# Patient Record
Sex: MALE | Race: WHITE | Employment: OTHER | URBAN - METROPOLITAN AREA
[De-identification: names, ages, dates, MRNs, and addresses within clinical notes are randomized per-mention and may not be internally consistent; named-entity substitution may affect disease eponyms.]

---

## 2017-03-30 NOTE — TELEPHONE ENCOUNTER
Patient is calling to request a refill for medication listed below. Has a six day supply. Please advise. Levothyroxine Sodium 100 MCG Oral Tab Take 1 tablet (100 mcg total) by mouth once daily.  Disp: 90 tablet Rfl: 0

## 2017-04-01 RX ORDER — LEVOTHYROXINE SODIUM 0.1 MG/1
100 TABLET ORAL
Qty: 90 TABLET | Refills: 0 | Status: SHIPPED | OUTPATIENT
Start: 2017-04-01 | End: 2017-06-30

## 2017-04-01 NOTE — TELEPHONE ENCOUNTER
Hypothyroid Medications  Protocol Criteria:  Appointment scheduled in the past 12 months or the next 3 months  TSH resulted in the past 12 months that is normal  Recent Visits       Provider Department Primary Dx    3 months ago Cristo Victoria MD [place]

## 2017-06-30 NOTE — TELEPHONE ENCOUNTER
LEVOTHYROXINE failed protocol, no evidence of TSH results on file or in media  Hypothyroid Medications  Protocol Criteria:  Appointment scheduled in the past 12 months or the next 3 months  TSH resulted in the past 12 months that is normal  Recent Outpatie

## 2017-07-01 NOTE — TELEPHONE ENCOUNTER
[facility] calling to check status of refill.     Cathie Hendrickson states Pt out of medication and going out of town 7/1/2017

## 2017-07-01 NOTE — TELEPHONE ENCOUNTER
Hypothyroid Medications: Refill protocol failed because the patient did not meet the protocol criteria.  Please advise in regards to refill request     Protocol Criteria:  Appointment scheduled in the past 12 months or the next 3 months  TSH resulted in the

## 2017-07-02 RX ORDER — LEVOTHYROXINE SODIUM 0.1 MG/1
100 TABLET ORAL
Qty: 90 TABLET | Refills: 0 | Status: SHIPPED | OUTPATIENT
Start: 2017-07-02 | End: 2017-10-07

## 2017-10-04 NOTE — TELEPHONE ENCOUNTER
Patient's wife is calling to follow up onthe medication refill request. Please advise. Levothyroxine Sodium 100 MCG Oral Tab Take 1 tablet (100 mcg total) by mouth once daily.  Disp: 90 tablet Rfl: 0

## 2017-10-07 NOTE — TELEPHONE ENCOUNTER
Hypothyroid Medications  Protocol Criteria:  Appointment scheduled in the past 12 months or the next 3 months  TSH resulted in the past 12 months that is normal  Recent Outpatient Visits            9 months ago Soft tissue mass    [facility], [facility], [facility]

## 2017-10-09 RX ORDER — LEVOTHYROXINE SODIUM 0.1 MG/1
100 TABLET ORAL
Qty: 90 TABLET | Refills: 0 | Status: SHIPPED | OUTPATIENT
Start: 2017-10-09 | End: 2018-01-08

## 2017-11-14 ENCOUNTER — LAB ENCOUNTER (OUTPATIENT)
Dept: LAB | Age: 72
End: 2017-11-14
Attending: INTERNAL MEDICINE
Payer: MEDICARE

## 2017-11-14 ENCOUNTER — PRIOR ORIGINAL RECORDS (OUTPATIENT)
Dept: OTHER | Age: 72
End: 2017-11-14

## 2017-11-14 DIAGNOSIS — I25.10 CAD (CORONARY ARTERY DISEASE): Primary | ICD-10-CM

## 2017-11-14 PROCEDURE — 84460 ALANINE AMINO (ALT) (SGPT): CPT

## 2017-11-14 PROCEDURE — 80061 LIPID PANEL: CPT

## 2017-11-14 PROCEDURE — 36415 COLL VENOUS BLD VENIPUNCTURE: CPT

## 2017-11-14 PROCEDURE — 84450 TRANSFERASE (AST) (SGOT): CPT

## 2017-11-15 LAB
ALT (SGPT): 21 U/L
AST (SGOT): 23 U/L
CHOLESTEROL, TOTAL: 183 MG/DL
HDL CHOLESTEROL: 67 MG/DL
LDL CHOLESTEROL: 107 MG/DL
NON-HDL CHOLESTEROL: 116 MG/DL
TRIGLYCERIDES: 45 MG/DL

## 2017-11-16 ENCOUNTER — PRIOR ORIGINAL RECORDS (OUTPATIENT)
Dept: OTHER | Age: 72
End: 2017-11-16

## 2017-12-05 ENCOUNTER — APPOINTMENT (OUTPATIENT)
Dept: GENERAL RADIOLOGY | Facility: HOSPITAL | Age: 72
End: 2017-12-05
Attending: EMERGENCY MEDICINE
Payer: MEDICARE

## 2017-12-05 ENCOUNTER — HOSPITAL ENCOUNTER (EMERGENCY)
Facility: HOSPITAL | Age: 72
Discharge: HOME | End: 2017-12-05
Attending: EMERGENCY MEDICINE
Payer: MEDICARE

## 2017-12-05 ENCOUNTER — PRIOR ORIGINAL RECORDS (OUTPATIENT)
Dept: OTHER | Age: 72
End: 2017-12-05

## 2017-12-05 VITALS
BODY MASS INDEX: 30.76 KG/M2 | TEMPERATURE: 99 F | RESPIRATION RATE: 14 BRPM | HEIGHT: 67 IN | OXYGEN SATURATION: 96 % | SYSTOLIC BLOOD PRESSURE: 116 MMHG | DIASTOLIC BLOOD PRESSURE: 97 MMHG | HEART RATE: 58 BPM | WEIGHT: 196 LBS

## 2017-12-05 DIAGNOSIS — I48.91 ATRIAL FIBRILLATION, NEW ONSET (HCC): Primary | ICD-10-CM

## 2017-12-05 PROCEDURE — 96374 THER/PROPH/DIAG INJ IV PUSH: CPT

## 2017-12-05 PROCEDURE — 93005 ELECTROCARDIOGRAM TRACING: CPT

## 2017-12-05 PROCEDURE — 84484 ASSAY OF TROPONIN QUANT: CPT | Performed by: EMERGENCY MEDICINE

## 2017-12-05 PROCEDURE — 92960 CARDIOVERSION ELECTRIC EXT: CPT

## 2017-12-05 PROCEDURE — 93010 ELECTROCARDIOGRAM REPORT: CPT | Performed by: EMERGENCY MEDICINE

## 2017-12-05 PROCEDURE — 99285 EMERGENCY DEPT VISIT HI MDM: CPT

## 2017-12-05 PROCEDURE — 71020 XR CHEST PA + LAT CHEST (CPT=71020): CPT | Performed by: EMERGENCY MEDICINE

## 2017-12-05 PROCEDURE — 85025 COMPLETE CBC W/AUTO DIFF WBC: CPT | Performed by: EMERGENCY MEDICINE

## 2017-12-05 PROCEDURE — 80048 BASIC METABOLIC PNL TOTAL CA: CPT | Performed by: EMERGENCY MEDICINE

## 2017-12-06 NOTE — ED NOTES
BEDSIDE CARDIOVERSION PERFORMED BY DR. Segovia Outlaw AND SUCCESSFUL. CONSENT FOR MILD SEDATION. PT A&O WITHOUT ANY COMPLAINTS. NO DISTRESS NOTED. CURRENTLY IN SINUS RHYTHM.

## 2017-12-06 NOTE — ED PROVIDER NOTES
Patient Seen in: [facility] AND [facility]    History   Patient presents with:  Arrythmia/Palpitations (cardiovascular)      HPI    The patient presents complaining of severe palpitations that started 1 hour ago while he was lying down in be Social History Main Topics    Smoking status: Former Smoker                                                                Packs/day: 0.25      Years: 2.00           Types: Cigarettes       Quit date: 10/5/1969    Smokeless tobacco: Never Used HOUR - Normal   CBC WITH DIFFERENTIAL WITH PLATELET    Narrative: The following orders were created for panel order CBC WITH DIFFERENTIAL WITH PLATELET.   Procedure                               Abnormality         Status                     --------- pain, right; Pulmonary hypertension; BPH (benign prostatic hyperplasia); Nocturia; Family history of prostate cancer; CAD (coronary artery disease), native coronary artery; Valvular heart disease;  Inguinal hernia; Dysphagia; Skin lesion; Cough; and Skin no baseline. Patient was subsequently deemed appropriate for discharge home with responsible caretaker. The sedation lasted 10 minutes during which I was present. Procedure:  Cardioversion.   The risks, benefits and alternatives were discussed the patient

## 2017-12-07 ENCOUNTER — OFFICE VISIT (OUTPATIENT)
Dept: INTERNAL MEDICINE CLINIC | Facility: CLINIC | Age: 72
End: 2017-12-07

## 2017-12-07 VITALS
SYSTOLIC BLOOD PRESSURE: 120 MMHG | WEIGHT: 200.13 LBS | DIASTOLIC BLOOD PRESSURE: 73 MMHG | BODY MASS INDEX: 31 KG/M2 | HEART RATE: 40 BPM

## 2017-12-07 DIAGNOSIS — I48.0 PAROXYSMAL ATRIAL FIBRILLATION (HCC): Primary | ICD-10-CM

## 2017-12-07 PROCEDURE — G0463 HOSPITAL OUTPT CLINIC VISIT: HCPCS | Performed by: INTERNAL MEDICINE

## 2017-12-07 PROCEDURE — 99214 OFFICE O/P EST MOD 30 MIN: CPT | Performed by: INTERNAL MEDICINE

## 2017-12-07 NOTE — PROGRESS NOTES
HPI:    Patient ID: Claire Piña is a 71year old male. HPI   [address] [place] [address] ED [address] was seen in [address] [place] [address] ED on 12/05/17. Per chart review:  The patient presents complaining of severe palpitations that started 1 hour ago while he was lying down in bed.   He Stroke Mother      Per NG: TIA's   • Kidney Disease Maternal Grandfather      Per NG kidney removal   • Skin cancer Maternal Grandfather    • Prostate Cancer Paternal Grandfather    • Heart Disease Other      PEr NG: Family history of CAD   • Skin cancer M Constitutional: He appears well-developed and well-nourished. No distress. HENT:   Head: Normocephalic and atraumatic. Eyes: Conjunctivae and EOM are normal. Pupils are equal, round, and reactive to light. Right eye exhibits no discharge.  Left eye ex performance and is accurate and complete.   Amaya Huertas MD, 12/7/2017, 12:18 PM

## 2017-12-14 LAB
BUN: 13 MG/DL
CALCIUM: 9.6 MG/DL
CHLORIDE: 102 MEQ/L
CREATININE, SERUM: 0.86 MG/DL
GLUCOSE: 86 MG/DL
HEMATOCRIT: 41.1 %
HEMOGLOBIN: 14.1 G/DL
PLATELETS: 142 K/UL
POTASSIUM, SERUM: 3.9 MEQ/L
RED BLOOD COUNT: 4.25 X 10-6/U
SODIUM: 136 MEQ/L
WHITE BLOOD COUNT: 4 X 10-3/U

## 2017-12-15 ENCOUNTER — PRIOR ORIGINAL RECORDS (OUTPATIENT)
Dept: OTHER | Age: 72
End: 2017-12-15

## 2017-12-15 ENCOUNTER — MYAURORA ACCOUNT LINK (OUTPATIENT)
Dept: OTHER | Age: 72
End: 2017-12-15

## 2017-12-19 ENCOUNTER — TELEPHONE (OUTPATIENT)
Dept: SURGERY | Facility: CLINIC | Age: 72
End: 2017-12-19

## 2017-12-19 DIAGNOSIS — Z87.438 HISTORY OF BPH: ICD-10-CM

## 2017-12-19 DIAGNOSIS — Z12.5 SCREENING FOR PROSTATE CANCER: Primary | ICD-10-CM

## 2017-12-19 NOTE — TELEPHONE ENCOUNTER
Pt has appt on , orders given for PSA and urinalysis lab have , pt asking for new orders, pt requesting cb once new orders are placed. Thank you.

## 2017-12-20 ENCOUNTER — APPOINTMENT (OUTPATIENT)
Dept: LAB | Age: 72
End: 2017-12-20
Attending: UROLOGY
Payer: MEDICARE

## 2017-12-20 DIAGNOSIS — Z12.5 SCREENING FOR PROSTATE CANCER: ICD-10-CM

## 2017-12-20 DIAGNOSIS — Z87.438 HISTORY OF BPH: ICD-10-CM

## 2017-12-20 PROCEDURE — 81003 URINALYSIS AUTO W/O SCOPE: CPT

## 2017-12-20 PROCEDURE — 36415 COLL VENOUS BLD VENIPUNCTURE: CPT

## 2017-12-26 ENCOUNTER — OFFICE VISIT (OUTPATIENT)
Dept: SURGERY | Facility: CLINIC | Age: 72
End: 2017-12-26

## 2017-12-26 VITALS
HEIGHT: 67 IN | WEIGHT: 200 LBS | BODY MASS INDEX: 31.39 KG/M2 | SYSTOLIC BLOOD PRESSURE: 116 MMHG | DIASTOLIC BLOOD PRESSURE: 68 MMHG | HEART RATE: 51 BPM | TEMPERATURE: 98 F

## 2017-12-26 DIAGNOSIS — Z79.01 ON RIVAROXABAN THERAPY: ICD-10-CM

## 2017-12-26 DIAGNOSIS — N40.1 BENIGN PROSTATIC HYPERPLASIA WITH URINARY FREQUENCY: Primary | ICD-10-CM

## 2017-12-26 DIAGNOSIS — N52.01 ERECTILE DYSFUNCTION DUE TO ARTERIAL INSUFFICIENCY: ICD-10-CM

## 2017-12-26 DIAGNOSIS — Z79.82 ASPIRIN LONG-TERM USE: ICD-10-CM

## 2017-12-26 DIAGNOSIS — Z80.42 FAMILY HISTORY OF PROSTATE CANCER: ICD-10-CM

## 2017-12-26 DIAGNOSIS — Z12.5 PROSTATE CANCER SCREENING: ICD-10-CM

## 2017-12-26 DIAGNOSIS — R35.0 BENIGN PROSTATIC HYPERPLASIA WITH URINARY FREQUENCY: Primary | ICD-10-CM

## 2017-12-26 DIAGNOSIS — R35.1 NOCTURIA: ICD-10-CM

## 2017-12-26 PROCEDURE — 99214 OFFICE O/P EST MOD 30 MIN: CPT | Performed by: UROLOGY

## 2017-12-26 PROCEDURE — G0463 HOSPITAL OUTPT CLINIC VISIT: HCPCS | Performed by: UROLOGY

## 2017-12-26 RX ORDER — SILDENAFIL CITRATE 20 MG/1
TABLET ORAL
Qty: 50 TABLET | Refills: 3 | Status: SHIPPED | OUTPATIENT
Start: 2017-12-26 | End: 2018-01-25

## 2017-12-26 NOTE — PROGRESS NOTES
HPI:    Patient ID: Tavon Martin is a 71year old male. HPI  1.  Voiding Dysfunction  Patient has current AUA score of 6, mild voiding dysfunction category, slightly worse compared to previous score of 5, mild voiding dysfunction category, on 11/28/2016 planned sexual activity. Review of Systems   Constitutional: Negative for chills and fever. HENT: Negative for voice change. Respiratory: Negative for chest tightness and shortness of breath. Cardiovascular: Negative for chest pain.    Gastrointest 0.5 oz/week     Cans of beer: 1 per week     Comment: Occasionally              Current Outpatient Prescriptions:  Sildenafil Citrate 20 MG Oral Tab Take 5 tablets 1-2 hours before sexual activity Disp: 50 tablet Rfl: 3   Rivaroxaban (XARELTO) 20 MG Oral Nursing note and vitals reviewed. 12/26/17  1444   BP: 116/68   Pulse: 51   Temp: 98.2 °F (36.8 °C)   TempSrc: Oral   Weight: 200 lb (90.7 kg)   Height: 5' 7\" (1.702 m)         Body mass index is 31.32 kg/m².     LABORATORIES  12/20/2017 UA Occult cost-effective but a lower dose.  He understands and chooses to take sildenafil (generic Viagra) 20 mg tablets, 5 of them for a total of 100 mg 1-2 hours before sexual activity.    (R35.1) Nocturia  Pt has current AUA score of 6, mild voiding dysfunction ca blood draw for PSA and urinalysis urine test before the visit. According to Medicare rules, next years PSA would have to be after December 21, 2018    3. I discussed with the patient that the heart healthy diet is the prostate healthy diet.  I advised

## 2017-12-26 NOTE — PATIENT INSTRUCTIONS
1.  Sildenafil 20 mg tablet,   FIVE tablets 1-2 hours before planned sexual activity to treat erectile dysfunction    2. Visit in 1 year or a little bit after that. Please get blood draw for PSA and urinalysis urine test before the visit.   According to Jason Hernandez

## 2018-01-08 ENCOUNTER — PRIOR ORIGINAL RECORDS (OUTPATIENT)
Dept: OTHER | Age: 73
End: 2018-01-08

## 2018-01-08 RX ORDER — LEVOTHYROXINE SODIUM 0.1 MG/1
100 TABLET ORAL
Qty: 90 TABLET | Refills: 1 | Status: SHIPPED | OUTPATIENT
Start: 2018-01-08 | End: 2018-07-11

## 2018-01-08 NOTE — TELEPHONE ENCOUNTER
Please advise on refill request.    Hypothyroid Medications  Protocol Criteria:  Appointment scheduled in the past 12 months or the next 3 months  TSH resulted in the past 12 months that is normal  Recent Outpatient Visits            1 week ago Benign pros

## 2018-01-08 NOTE — TELEPHONE ENCOUNTER
Pt calling to request refill for medication Levothyroxine Sodium 100 MCG Oral Tab. Please advise       Current Outpatient Prescriptions:  Levothyroxine Sodium 100 MCG Oral Tab Take 1 tablet (100 mcg total) by mouth once daily.  Disp: 90 tablet Rfl: 0

## 2018-01-09 ENCOUNTER — PRIOR ORIGINAL RECORDS (OUTPATIENT)
Dept: OTHER | Age: 73
End: 2018-01-09

## 2018-01-25 RX ORDER — SILDENAFIL CITRATE 20 MG/1
TABLET ORAL
Qty: 50 TABLET | Refills: 3 | Status: SHIPPED | OUTPATIENT
Start: 2018-01-25 | End: 2020-01-30

## 2018-01-25 NOTE — TELEPHONE ENCOUNTER
Dr. Clark Mo, pt LOV 12/26/17 pt is calling he is in [facility] until April, he is asking if we can mail him his sildenafil script to his [place] home, If you agree please review and sign med, thank you. I copied and pasted part of your last note below.     1

## 2018-01-25 NOTE — TELEPHONE ENCOUNTER
Pt currently in [place] until April. Requesting refill for Sildenafil. Pt would like to know if prescription could be mailed to his [place] address at [address]. [address] [address].  If this can not be done pt is asking if prescription can be

## 2018-01-26 NOTE — TELEPHONE ENCOUNTER
I called pt and explained to him that we can NOT call script to an out of state pharmacy, what we can do is send it to his local pharmacy here and pt can have it then transferred to his [address].  Pt stated to just mail the script to his confirmed F

## 2018-02-19 RX ORDER — CARBAMAZEPINE 400 MG/1
TABLET, EXTENDED RELEASE ORAL
Qty: 270 TABLET | Refills: 0 | Status: SHIPPED | OUTPATIENT
Start: 2018-02-19 | End: 2018-05-21

## 2018-05-10 ENCOUNTER — PRIOR ORIGINAL RECORDS (OUTPATIENT)
Dept: OTHER | Age: 73
End: 2018-05-10

## 2018-05-21 RX ORDER — CARBAMAZEPINE 400 MG/1
TABLET, EXTENDED RELEASE ORAL
Qty: 270 TABLET | Refills: 0 | Status: SHIPPED | OUTPATIENT
Start: 2018-05-21 | End: 2019-02-15

## 2018-05-24 ENCOUNTER — TELEPHONE (OUTPATIENT)
Dept: OTHER | Age: 73
End: 2018-05-24

## 2018-05-24 NOTE — TELEPHONE ENCOUNTER
[facility] rep Isael calling to initiate a PA for CARBAMAZEPINE  mg tab. Rep needs additional information which was provided; response time up to 24 hours.  REF# [id number]

## 2018-05-26 ENCOUNTER — OFFICE VISIT (OUTPATIENT)
Dept: INTERNAL MEDICINE CLINIC | Facility: CLINIC | Age: 73
End: 2018-05-26

## 2018-05-26 VITALS
WEIGHT: 205 LBS | TEMPERATURE: 97 F | HEART RATE: 121 BPM | DIASTOLIC BLOOD PRESSURE: 75 MMHG | BODY MASS INDEX: 32.18 KG/M2 | HEIGHT: 67 IN | SYSTOLIC BLOOD PRESSURE: 109 MMHG

## 2018-05-26 DIAGNOSIS — G50.0 TRIGEMINAL NEURALGIA: Primary | ICD-10-CM

## 2018-05-26 PROCEDURE — 99212 OFFICE O/P EST SF 10 MIN: CPT | Performed by: INTERNAL MEDICINE

## 2018-05-26 PROCEDURE — G0463 HOSPITAL OUTPT CLINIC VISIT: HCPCS | Performed by: INTERNAL MEDICINE

## 2018-05-26 RX ORDER — CARBAMAZEPINE 200 MG/1
400 TABLET ORAL 3 TIMES DAILY
Qty: 540 TABLET | Refills: 3 | Status: SHIPPED | OUTPATIENT
Start: 2018-05-26 | End: 2018-08-03 | Stop reason: DRUGHIGH

## 2018-05-26 RX ORDER — METOPROLOL TARTRATE 50 MG/1
1 TABLET, FILM COATED ORAL 2 TIMES DAILY
Refills: 3 | COMMUNITY
Start: 2018-05-12 | End: 2019-06-23

## 2018-05-26 RX ORDER — RIVAROXABAN 20 MG/1
20 TABLET, FILM COATED ORAL DAILY
Refills: 2 | COMMUNITY
Start: 2018-05-07 | End: 2020-10-07

## 2018-05-26 RX ORDER — ROSUVASTATIN CALCIUM 20 MG/1
20 TABLET, COATED ORAL
Refills: 2 | COMMUNITY
Start: 2018-03-09 | End: 2018-08-03 | Stop reason: DRUGHIGH

## 2018-05-29 NOTE — TELEPHONE ENCOUNTER
PA denied. Plan states covered alternative drugs that can treat your condition are available on a tier that only contains generic drugs and your plan does not allow tiering exceptions from a mixed tier to a generic only tier.

## 2018-05-31 ENCOUNTER — MYAURORA ACCOUNT LINK (OUTPATIENT)
Dept: OTHER | Age: 73
End: 2018-05-31

## 2018-05-31 ENCOUNTER — PRIOR ORIGINAL RECORDS (OUTPATIENT)
Dept: OTHER | Age: 73
End: 2018-05-31

## 2018-07-12 RX ORDER — LEVOTHYROXINE SODIUM 0.1 MG/1
TABLET ORAL
Qty: 90 TABLET | Refills: 0 | Status: SHIPPED | OUTPATIENT
Start: 2018-07-12 | End: 2018-10-08

## 2018-07-24 ENCOUNTER — PRIOR ORIGINAL RECORDS (OUTPATIENT)
Dept: OTHER | Age: 73
End: 2018-07-24

## 2018-07-25 ENCOUNTER — PRIOR ORIGINAL RECORDS (OUTPATIENT)
Dept: OTHER | Age: 73
End: 2018-07-25

## 2018-08-02 ENCOUNTER — TELEPHONE (OUTPATIENT)
Dept: INTERNAL MEDICINE CLINIC | Facility: CLINIC | Age: 73
End: 2018-08-02

## 2018-08-02 NOTE — TELEPHONE ENCOUNTER
Pt will be having surgery on 8-8 Dr Lackey office is asking to have the pt Medical Clearance report faxed   Please advise         FAX # [phone]

## 2018-08-02 NOTE — TELEPHONE ENCOUNTER
Dr. Vasyl Dawson  Please see note below and advise. LOV 5/26/18 for Trigeminal neuralgia. . Or would you prefer to see pt for pre- op clearance?

## 2018-08-02 NOTE — TELEPHONE ENCOUNTER
MIRTA- Please call pt and schedule Pre-op appt per Dr. Jefferson Bojorquez. Please remind pt to bring any paperwork from surgeons office that shows what he needs for pre-op. Thanks!

## 2018-08-03 ENCOUNTER — OFFICE VISIT (OUTPATIENT)
Dept: INTERNAL MEDICINE CLINIC | Facility: CLINIC | Age: 73
End: 2018-08-03
Payer: MEDICARE

## 2018-08-03 VITALS
TEMPERATURE: 97 F | WEIGHT: 210.38 LBS | HEIGHT: 67 IN | HEART RATE: 112 BPM | BODY MASS INDEX: 33.02 KG/M2 | DIASTOLIC BLOOD PRESSURE: 77 MMHG | SYSTOLIC BLOOD PRESSURE: 112 MMHG

## 2018-08-03 DIAGNOSIS — I48.20 CHRONIC ATRIAL FIBRILLATION (HCC): ICD-10-CM

## 2018-08-03 DIAGNOSIS — Z01.818 PREOP EXAMINATION: Primary | ICD-10-CM

## 2018-08-03 DIAGNOSIS — I25.10 CORONARY ARTERY DISEASE INVOLVING NATIVE CORONARY ARTERY OF NATIVE HEART WITHOUT ANGINA PECTORIS: ICD-10-CM

## 2018-08-03 DIAGNOSIS — I38 VALVULAR HEART DISEASE: ICD-10-CM

## 2018-08-03 DIAGNOSIS — H26.9 CATARACT OF BOTH EYES, UNSPECIFIED CATARACT TYPE: ICD-10-CM

## 2018-08-03 PROCEDURE — G0463 HOSPITAL OUTPT CLINIC VISIT: HCPCS | Performed by: INTERNAL MEDICINE

## 2018-08-03 PROCEDURE — 99214 OFFICE O/P EST MOD 30 MIN: CPT | Performed by: INTERNAL MEDICINE

## 2018-08-03 RX ORDER — ROSUVASTATIN CALCIUM 10 MG/1
10 TABLET, COATED ORAL NIGHTLY
COMMUNITY
End: 2019-06-23

## 2018-08-03 NOTE — PROGRESS NOTES
HPI:    Patient ID: Shweta Feliciano is a 71year old male. Pre-Op  Patient presents for Pre-Op exam: surgeon Dr. James Mcghee. Cataract extraction with implant Rt eye 8/8 & Lt eye 8/22. Heart Problem   This is a chronic problem.  The current episode started more Paternal Grandfather    • Heart Disease Other      PEr NG: Family history of CAD   • Skin cancer Maternal Grandmother       Smoking status: Former Smoker                                                              Packs/day: 0.25      Years: 2.00 normal.   Neck: Normal range of motion. Neck supple. No thyromegaly present. Cardiovascular: Normal rate and normal heart sounds. An irregularly irregular rhythm present. Exam reveals no gallop and no friction rub. No murmur heard.   Pulmonary/Chest: prepared under the direction and in the presence of CLAUS Negron MD.   Electronically Signed: Mer Miller, 8/3/2018, 11:23 AM.    CLAUS MORENO MD,  personally performed the services described in this documentation.  All medical record entries ma

## 2018-09-19 NOTE — TELEPHONE ENCOUNTER
Call from [address] [place], [place] [address] requesting information on how long patient has been on the Carbamazepine. Dates for medication usage provided,response time from [facility] to make a decision is due by 9/25/2018 ref# [id number].

## 2018-10-08 RX ORDER — LEVOTHYROXINE SODIUM 0.1 MG/1
TABLET ORAL
Qty: 90 TABLET | Refills: 0 | Status: SHIPPED | OUTPATIENT
Start: 2018-10-08 | End: 2019-01-04

## 2018-12-12 ENCOUNTER — LAB ENCOUNTER (OUTPATIENT)
Dept: LAB | Age: 73
End: 2018-12-12
Attending: INTERNAL MEDICINE
Payer: MEDICARE

## 2018-12-12 ENCOUNTER — PRIOR ORIGINAL RECORDS (OUTPATIENT)
Dept: OTHER | Age: 73
End: 2018-12-12

## 2018-12-12 DIAGNOSIS — I25.10 CORONARY ATHEROSCLEROSIS OF NATIVE CORONARY ARTERY: Primary | ICD-10-CM

## 2018-12-12 PROCEDURE — 36415 COLL VENOUS BLD VENIPUNCTURE: CPT

## 2018-12-12 PROCEDURE — 85025 COMPLETE CBC W/AUTO DIFF WBC: CPT

## 2018-12-12 PROCEDURE — 80053 COMPREHEN METABOLIC PANEL: CPT

## 2018-12-13 LAB
ALBUMIN: 4 G/DL
ALKALINE PHOSPHATATE(ALK PHOS): 77 IU/L
BILIRUBIN TOTAL: 0.7 MG/DL
BUN: 16 MG/DL
CALCIUM: 9.8 MG/DL
CHLORIDE: 108 MEQ/L
CREATININE, SERUM: 0.92 MG/DL
GLOBULIN: 2.5 G/DL
GLUCOSE: 105 MG/DL
HEMATOCRIT: 41.8 %
HEMOGLOBIN: 14.3 G/DL
PLATELETS: 168 K/UL
POTASSIUM, SERUM: 4.4 MEQ/L
PROTEIN, TOTAL: 6.5 G/DL
RED BLOOD COUNT: 4.28 X 10-6/U
SGOT (AST): 29 IU/L
SGPT (ALT): 34 IU/L
SODIUM: 139 MEQ/L
WHITE BLOOD COUNT: 4.6 X 10-3/U

## 2018-12-18 ENCOUNTER — PRIOR ORIGINAL RECORDS (OUTPATIENT)
Dept: OTHER | Age: 73
End: 2018-12-18

## 2018-12-20 ENCOUNTER — PRIOR ORIGINAL RECORDS (OUTPATIENT)
Dept: OTHER | Age: 73
End: 2018-12-20

## 2018-12-20 ENCOUNTER — LAB ENCOUNTER (OUTPATIENT)
Dept: LAB | Age: 73
End: 2018-12-20
Attending: INTERNAL MEDICINE
Payer: MEDICARE

## 2018-12-20 ENCOUNTER — MYAURORA ACCOUNT LINK (OUTPATIENT)
Dept: OTHER | Age: 73
End: 2018-12-20

## 2018-12-20 DIAGNOSIS — I25.10 CAD (CORONARY ARTERY DISEASE): Primary | ICD-10-CM

## 2018-12-20 PROCEDURE — 36415 COLL VENOUS BLD VENIPUNCTURE: CPT

## 2018-12-20 PROCEDURE — 84450 TRANSFERASE (AST) (SGOT): CPT

## 2018-12-20 PROCEDURE — 80061 LIPID PANEL: CPT

## 2018-12-20 PROCEDURE — 84460 ALANINE AMINO (ALT) (SGPT): CPT

## 2018-12-21 ENCOUNTER — PRIOR ORIGINAL RECORDS (OUTPATIENT)
Dept: OTHER | Age: 73
End: 2018-12-21

## 2018-12-21 LAB
AST (SGOT): 30 U/L
CHOLESTEROL, TOTAL: 168 MG/DL
CREATININE KINASE: 25 U/L
HDL CHOLESTEROL: 64 MG/DL
LDL CHOLESTEROL: 86 MG/DL
NON-HDL CHOLESTEROL: 104 MG/DL
TRIGLYCERIDES: 74 MG/DL

## 2018-12-28 ENCOUNTER — TELEPHONE (OUTPATIENT)
Dept: CARDIOLOGY CLINIC | Facility: CLINIC | Age: 73
End: 2018-12-28

## 2018-12-28 ENCOUNTER — PRIOR ORIGINAL RECORDS (OUTPATIENT)
Dept: OTHER | Age: 73
End: 2018-12-28

## 2018-12-28 NOTE — TELEPHONE ENCOUNTER
Pt states he just got a refill for the following medication and would like to know why he only got quantity 30 vs the usual 90 please call thank you     Current Outpatient Medications:     •  Rosuvastatin Calcium 10 MG Oral Tab, Take 10 mg by mouth nightly

## 2018-12-28 NOTE — TELEPHONE ENCOUNTER
Reviewed chart. Patient not seen at [address] [facility]. Sees Dr. Aruna Brooks at [address] [facility]. S/w patient gave [facility] phone number to him.

## 2019-01-01 ENCOUNTER — EXTERNAL RECORD (OUTPATIENT)
Dept: HEALTH INFORMATION MANAGEMENT | Facility: OTHER | Age: 74
End: 2019-01-01

## 2019-01-07 NOTE — TELEPHONE ENCOUNTER
Hypothyroid Medications. PROTOCOL FAILED. PLEASE ADVISE ON REFILL. THANKS.     Protocol Criteria:  Appointment scheduled in the past 12 months or the next 3 months  TSH resulted in the past 12 months that is normal  Recent Outpatient Visits            5 mon

## 2019-01-11 RX ORDER — LEVOTHYROXINE SODIUM 0.1 MG/1
TABLET ORAL
Qty: 90 TABLET | Refills: 0 | Status: SHIPPED | OUTPATIENT
Start: 2019-01-11 | End: 2019-01-14

## 2019-01-17 RX ORDER — LEVOTHYROXINE SODIUM 0.1 MG/1
TABLET ORAL
Qty: 90 TABLET | Refills: 0 | Status: SHIPPED | OUTPATIENT
Start: 2019-01-17 | End: 2019-07-20

## 2019-02-16 RX ORDER — CARBAMAZEPINE 400 MG/1
TABLET, EXTENDED RELEASE ORAL
Qty: 270 TABLET | Refills: 0 | Status: SHIPPED | OUTPATIENT
Start: 2019-02-16 | End: 2019-05-11

## 2019-02-18 ENCOUNTER — TELEPHONE (OUTPATIENT)
Dept: SURGERY | Facility: CLINIC | Age: 74
End: 2019-02-18

## 2019-02-18 DIAGNOSIS — R35.1 NOCTURIA: Primary | ICD-10-CM

## 2019-02-18 DIAGNOSIS — Z12.5 SPECIAL SCREENING FOR MALIGNANT NEOPLASM OF PROSTATE: ICD-10-CM

## 2019-02-18 NOTE — TELEPHONE ENCOUNTER
Pt. states that current order to get labs done has . Need new order to be entered. Pt. States that he has his annual appt. sched for Wed. 19.

## 2019-02-19 ENCOUNTER — APPOINTMENT (OUTPATIENT)
Dept: LAB | Age: 74
End: 2019-02-19
Attending: UROLOGY
Payer: MEDICARE

## 2019-02-19 DIAGNOSIS — Z12.5 SPECIAL SCREENING FOR MALIGNANT NEOPLASM OF PROSTATE: ICD-10-CM

## 2019-02-19 DIAGNOSIS — R35.1 NOCTURIA: ICD-10-CM

## 2019-02-19 LAB
BACTERIA UR QL AUTO: NEGATIVE /HPF
BILIRUB UR QL: NEGATIVE
CLARITY UR: CLEAR
COLOR UR: YELLOW
COMPLEXED PSA SERPL-MCNC: 2.22 NG/ML (ref ?–4)
GLUCOSE UR-MCNC: NEGATIVE MG/DL
HGB UR QL STRIP.AUTO: NEGATIVE
KETONES UR-MCNC: NEGATIVE MG/DL
LEUKOCYTE ESTERASE UR QL STRIP.AUTO: NEGATIVE
NITRITE UR QL STRIP.AUTO: NEGATIVE
PH UR: 6 [PH] (ref 5–8)
PROT UR-MCNC: NEGATIVE MG/DL
RBC #/AREA URNS AUTO: <1 /HPF
SP GR UR STRIP: 1.02 (ref 1–1.03)
UROBILINOGEN UR STRIP-ACNC: <2
VIT C UR-MCNC: 20 MG/DL
WBC #/AREA URNS AUTO: <1 /HPF

## 2019-02-19 PROCEDURE — 36415 COLL VENOUS BLD VENIPUNCTURE: CPT

## 2019-02-19 PROCEDURE — 81003 URINALYSIS AUTO W/O SCOPE: CPT

## 2019-02-19 NOTE — TELEPHONE ENCOUNTER
See 2017 last office visit progress note for plan. Orders have ; new orders generated per 2017 plan. Patient contacted and is aware.

## 2019-02-20 ENCOUNTER — OFFICE VISIT (OUTPATIENT)
Dept: SURGERY | Facility: CLINIC | Age: 74
End: 2019-02-20
Payer: MEDICARE

## 2019-02-20 VITALS
TEMPERATURE: 98 F | BODY MASS INDEX: 32.18 KG/M2 | HEART RATE: 50 BPM | WEIGHT: 205 LBS | SYSTOLIC BLOOD PRESSURE: 120 MMHG | DIASTOLIC BLOOD PRESSURE: 76 MMHG | HEIGHT: 67 IN

## 2019-02-20 DIAGNOSIS — N52.01 ERECTILE DYSFUNCTION DUE TO ARTERIAL INSUFFICIENCY: ICD-10-CM

## 2019-02-20 DIAGNOSIS — R35.0 BENIGN PROSTATIC HYPERPLASIA WITH URINARY FREQUENCY: Primary | ICD-10-CM

## 2019-02-20 DIAGNOSIS — R36.1 HEMOSPERMIA: ICD-10-CM

## 2019-02-20 DIAGNOSIS — N40.1 BENIGN PROSTATIC HYPERPLASIA WITH URINARY FREQUENCY: Primary | ICD-10-CM

## 2019-02-20 DIAGNOSIS — Z80.42 FAMILY HISTORY OF PROSTATE CANCER IN FATHER: ICD-10-CM

## 2019-02-20 DIAGNOSIS — Z79.01 ON RIVAROXABAN THERAPY: ICD-10-CM

## 2019-02-20 DIAGNOSIS — Z79.82 ASPIRIN LONG-TERM USE: ICD-10-CM

## 2019-02-20 DIAGNOSIS — Z12.5 PROSTATE CANCER SCREENING: ICD-10-CM

## 2019-02-20 DIAGNOSIS — R35.1 NOCTURIA: ICD-10-CM

## 2019-02-20 PROCEDURE — 99214 OFFICE O/P EST MOD 30 MIN: CPT | Performed by: UROLOGY

## 2019-02-20 PROCEDURE — G0463 HOSPITAL OUTPT CLINIC VISIT: HCPCS | Performed by: UROLOGY

## 2019-02-20 NOTE — PATIENT INSTRUCTIONS
Ranjith Rodriguez M.D.      1.  Please notify my office nurses if you have recurrent episodes of hematospermia (blood in the ejaculate sperm fluid).     2.  Continue sildenafil 20 mg tablets, 5 tablets 1.5--2 hours before planned

## 2019-02-20 NOTE — PROGRESS NOTES
HPI:    Patient ID: Jim Gaines is a 68year old male.     HPI  Voiding Dysfunction  Patient has current AUA score of 7, mild voiding dysfunction category, worse compared to previous score of 6, mild voiding dysfunction category, on 12/26/2017 per chart r Xarelto for A-fib. He denies any gross hematuria. Chart review--as above. Previously rising PSA which subsequently stabilized. 11/28/2016 office visit with me; prostate 1+ enlarged, no nodules; Viagra 100 mg 1-2 hours before planned sexual activity. CAT HAIR STD EXTRACT  Sulfa Antibiotics           Comment:Other reaction(s): SULFA (SULFONAMIDE ANTIBIOTICS)    HISTORY:  Past Medical History:   Diagnosis Date   • Atrial fibrillation [facility])    • Benign prostatic hypertrophy    • Coronary artery disease 20 again several times when you urinated?: Less than 1 time in 5  Over the past month, how often have you found it difficult to postpone urination?: Not at all  Over the past month, how often have you had a weak urinary stream?: Less than 1 time in 5  Over th frequency  (primary encounter diagnosis)  On exam, prostate 1+ enlarged, no palpable nodules or indurations.  I fully explained to patient the benefits, risks, complications, side effects, reasons for, nature of, alternatives of starting long term dutasteri observe.    (R35.1) Nocturia  Patient has an AUA score of 7, mild voiding dysfunction category. Patient states he drinks quite a bit of fluid when exercising and attributes a least some of his voiding dysfunction to that.  I fully explained to patient the b fluid).     2. Continue sildenafil 20 mg tablets, 5 tablets 1.5--2 hours before planned sexual activity. Please call my office nurses if you need a new prescription     3. The urine specimen for cytology in light of episode of hematospermia     4.   Visi

## 2019-02-26 ENCOUNTER — TELEPHONE (OUTPATIENT)
Dept: SURGERY | Facility: CLINIC | Age: 74
End: 2019-02-26

## 2019-02-26 NOTE — TELEPHONE ENCOUNTER
----- Message from EVER Arthur sent at 2/22/2019 10:08 AM CST -----  Staff,    Please let patient know his urine cytology is normal.  Plan for follow up in April 2020. Please let us know if he has any questions or concerns along the way.

## 2019-02-28 VITALS
BODY MASS INDEX: 32.02 KG/M2 | SYSTOLIC BLOOD PRESSURE: 114 MMHG | WEIGHT: 204 LBS | HEIGHT: 67 IN | OXYGEN SATURATION: 97 % | HEART RATE: 47 BPM | DIASTOLIC BLOOD PRESSURE: 64 MMHG

## 2019-02-28 VITALS
BODY MASS INDEX: 32.02 KG/M2 | WEIGHT: 204 LBS | SYSTOLIC BLOOD PRESSURE: 112 MMHG | HEIGHT: 67 IN | DIASTOLIC BLOOD PRESSURE: 82 MMHG | HEART RATE: 121 BPM

## 2019-02-28 VITALS
DIASTOLIC BLOOD PRESSURE: 72 MMHG | SYSTOLIC BLOOD PRESSURE: 132 MMHG | HEART RATE: 40 BPM | OXYGEN SATURATION: 96 % | WEIGHT: 199 LBS | HEIGHT: 67 IN | BODY MASS INDEX: 31.23 KG/M2

## 2019-02-28 VITALS
BODY MASS INDEX: 32.96 KG/M2 | SYSTOLIC BLOOD PRESSURE: 116 MMHG | WEIGHT: 210 LBS | HEIGHT: 67 IN | DIASTOLIC BLOOD PRESSURE: 68 MMHG | HEART RATE: 96 BPM

## 2019-02-28 VITALS
BODY MASS INDEX: 31.23 KG/M2 | DIASTOLIC BLOOD PRESSURE: 72 MMHG | WEIGHT: 199 LBS | HEIGHT: 67 IN | HEART RATE: 44 BPM | OXYGEN SATURATION: 97 % | SYSTOLIC BLOOD PRESSURE: 122 MMHG

## 2019-04-01 RX ORDER — ROSUVASTATIN CALCIUM 20 MG/1
TABLET, COATED ORAL
COMMUNITY
Start: 2017-11-16 | End: 2019-06-12

## 2019-04-01 RX ORDER — AMOXICILLIN 500 MG/1
CAPSULE ORAL
COMMUNITY
Start: 2015-04-09

## 2019-04-01 RX ORDER — METOPROLOL TARTRATE 50 MG/1
TABLET, FILM COATED ORAL
COMMUNITY
Start: 2018-05-10 | End: 2019-04-26 | Stop reason: SDUPTHER

## 2019-04-29 RX ORDER — METOPROLOL TARTRATE 50 MG/1
TABLET, FILM COATED ORAL
Qty: 180 TABLET | Refills: 0 | Status: SHIPPED | OUTPATIENT
Start: 2019-04-29 | End: 2019-06-12

## 2019-05-11 NOTE — TELEPHONE ENCOUNTER
Review pended refill request as it does not fall under a protocol.     Last Rx: 2/16/19  LOV: 9 months ago

## 2019-05-28 RX ORDER — CARBAMAZEPINE 400 MG/1
TABLET, EXTENDED RELEASE ORAL
Qty: 270 TABLET | Refills: 0 | Status: SHIPPED | OUTPATIENT
Start: 2019-05-28 | End: 2019-12-09

## 2019-06-06 PROBLEM — E78.00 PURE HYPERCHOLESTEROLEMIA: Status: ACTIVE | Noted: 2019-06-06

## 2019-06-06 PROBLEM — I25.10 CORONARY ATHEROSCLEROSIS OF NATIVE CORONARY ARTERY: Status: ACTIVE | Noted: 2019-06-06

## 2019-06-06 PROBLEM — Z95.1 HX OF CABG: Status: ACTIVE | Noted: 2019-06-06

## 2019-06-06 PROBLEM — I10 ESSENTIAL HYPERTENSION, BENIGN: Status: ACTIVE | Noted: 2019-06-06

## 2019-06-06 PROBLEM — I48.20 CHRONIC ATRIAL FIBRILLATION (CMD): Status: ACTIVE | Noted: 2019-06-06

## 2019-06-06 RX ORDER — LEVOTHYROXINE SODIUM 0.1 MG/1
100 TABLET ORAL DAILY
COMMUNITY
Start: 2010-02-23

## 2019-06-10 ENCOUNTER — TELEPHONE (OUTPATIENT)
Dept: CARDIOLOGY | Age: 74
End: 2019-06-10

## 2019-06-12 ENCOUNTER — OFFICE VISIT (OUTPATIENT)
Dept: CARDIOLOGY | Age: 74
End: 2019-06-12

## 2019-06-12 VITALS
HEIGHT: 65 IN | WEIGHT: 209 LBS | BODY MASS INDEX: 34.82 KG/M2 | SYSTOLIC BLOOD PRESSURE: 130 MMHG | HEART RATE: 60 BPM | DIASTOLIC BLOOD PRESSURE: 70 MMHG

## 2019-06-12 DIAGNOSIS — Z95.1 HX OF CABG: ICD-10-CM

## 2019-06-12 DIAGNOSIS — Z79.899 LONG TERM CURRENT USE OF ANTIARRHYTHMIC DRUG: ICD-10-CM

## 2019-06-12 DIAGNOSIS — I25.10 ATHEROSCLEROSIS OF NATIVE CORONARY ARTERY OF NATIVE HEART WITHOUT ANGINA PECTORIS: ICD-10-CM

## 2019-06-12 DIAGNOSIS — Z98.890 S/P ABLATION OF ATRIAL FLUTTER: ICD-10-CM

## 2019-06-12 DIAGNOSIS — Z79.01 LONG TERM CURRENT USE OF ANTICOAGULANT: ICD-10-CM

## 2019-06-12 DIAGNOSIS — Z86.79 S/P ABLATION OF ATRIAL FLUTTER: ICD-10-CM

## 2019-06-12 DIAGNOSIS — I10 ESSENTIAL HYPERTENSION, BENIGN: ICD-10-CM

## 2019-06-12 DIAGNOSIS — I48.19 PERSISTENT ATRIAL FIBRILLATION (CMD): Primary | ICD-10-CM

## 2019-06-12 DIAGNOSIS — Z79.899 HIGH RISK MEDICATION USE: ICD-10-CM

## 2019-06-12 PROCEDURE — 99214 OFFICE O/P EST MOD 30 MIN: CPT | Performed by: INTERNAL MEDICINE

## 2019-06-12 RX ORDER — SPIRONOLACTONE 25 MG/1
25 TABLET ORAL DAILY
COMMUNITY

## 2019-06-12 RX ORDER — AMIODARONE HYDROCHLORIDE 200 MG/1
200 TABLET ORAL 2 TIMES DAILY
COMMUNITY
End: 2019-08-05 | Stop reason: ALTCHOICE

## 2019-06-12 RX ORDER — WARFARIN SODIUM 5 MG/1
5 TABLET ORAL DAILY
COMMUNITY
End: 2019-06-12 | Stop reason: ALTCHOICE

## 2019-06-12 RX ORDER — ATORVASTATIN CALCIUM 80 MG/1
80 TABLET, FILM COATED ORAL DAILY
COMMUNITY
End: 2021-01-22 | Stop reason: SDUPTHER

## 2019-06-12 RX ORDER — PANTOPRAZOLE SODIUM 40 MG/1
40 TABLET, DELAYED RELEASE ORAL DAILY
COMMUNITY
End: 2019-09-11

## 2019-06-14 ENCOUNTER — LAB ENCOUNTER (OUTPATIENT)
Dept: LAB | Age: 74
End: 2019-06-14
Attending: INTERNAL MEDICINE
Payer: MEDICARE

## 2019-06-14 DIAGNOSIS — I10 ESSENTIAL HYPERTENSION, MALIGNANT: ICD-10-CM

## 2019-06-14 DIAGNOSIS — Z79.01 LONG TERM (CURRENT) USE OF ANTICOAGULANTS: ICD-10-CM

## 2019-06-14 DIAGNOSIS — Z95.1 POSTSURGICAL AORTOCORONARY BYPASS STATUS: ICD-10-CM

## 2019-06-14 DIAGNOSIS — I48.19 PERSISTENT ATRIAL FIBRILLATION (HCC): Primary | ICD-10-CM

## 2019-06-14 DIAGNOSIS — Z79.899 ENCOUNTER FOR LONG-TERM (CURRENT) USE OF OTHER MEDICATIONS: ICD-10-CM

## 2019-06-14 DIAGNOSIS — I25.10 CORONARY ATHEROSCLEROSIS OF NATIVE CORONARY ARTERY: ICD-10-CM

## 2019-06-14 LAB
INR PPP: 1.61 (ref 2–3)
NORMAL CONTROL: ABNORMAL
PATIENT ON COUMADIN Y/N: ABNORMAL
PROTHROMBIN TIME: 19.1 S

## 2019-06-14 PROCEDURE — 85610 PROTHROMBIN TIME: CPT

## 2019-06-14 PROCEDURE — 36415 COLL VENOUS BLD VENIPUNCTURE: CPT

## 2019-06-17 ENCOUNTER — TELEPHONE (OUTPATIENT)
Dept: CARDIOLOGY | Age: 74
End: 2019-06-17

## 2019-06-18 ENCOUNTER — CLINICAL ABSTRACT (OUTPATIENT)
Dept: CARDIOLOGY | Age: 74
End: 2019-06-18

## 2019-06-18 DIAGNOSIS — I25.10 ATHEROSCLEROSIS OF NATIVE CORONARY ARTERY OF NATIVE HEART WITHOUT ANGINA PECTORIS: ICD-10-CM

## 2019-06-18 DIAGNOSIS — Z86.79 S/P ABLATION OF ATRIAL FLUTTER: ICD-10-CM

## 2019-06-18 DIAGNOSIS — Z79.899 LONG TERM CURRENT USE OF ANTIARRHYTHMIC DRUG: ICD-10-CM

## 2019-06-18 DIAGNOSIS — Z79.899 HIGH RISK MEDICATION USE: ICD-10-CM

## 2019-06-18 DIAGNOSIS — I48.19 PERSISTENT ATRIAL FIBRILLATION (CMD): Primary | ICD-10-CM

## 2019-06-18 DIAGNOSIS — Z95.1 HX OF CABG: ICD-10-CM

## 2019-06-18 DIAGNOSIS — Z95.1 HX OF CABG: Primary | ICD-10-CM

## 2019-06-18 DIAGNOSIS — I10 ESSENTIAL HYPERTENSION, BENIGN: ICD-10-CM

## 2019-06-18 DIAGNOSIS — Z79.01 LONG TERM CURRENT USE OF ANTICOAGULANT: ICD-10-CM

## 2019-06-18 DIAGNOSIS — Z98.890 S/P ABLATION OF ATRIAL FLUTTER: ICD-10-CM

## 2019-06-23 ENCOUNTER — APPOINTMENT (OUTPATIENT)
Dept: GENERAL RADIOLOGY | Age: 74
End: 2019-06-23
Attending: EMERGENCY MEDICINE
Payer: MEDICARE

## 2019-06-23 ENCOUNTER — HOSPITAL ENCOUNTER (OUTPATIENT)
Age: 74
Discharge: HOME | End: 2019-06-23
Attending: EMERGENCY MEDICINE
Payer: MEDICARE

## 2019-06-23 VITALS
OXYGEN SATURATION: 97 % | RESPIRATION RATE: 18 BRPM | DIASTOLIC BLOOD PRESSURE: 88 MMHG | BODY MASS INDEX: 33.32 KG/M2 | TEMPERATURE: 98 F | HEIGHT: 65 IN | SYSTOLIC BLOOD PRESSURE: 156 MMHG | HEART RATE: 62 BPM | WEIGHT: 200 LBS

## 2019-06-23 DIAGNOSIS — S62.102A LEFT WRIST FRACTURE, CLOSED, INITIAL ENCOUNTER: Primary | ICD-10-CM

## 2019-06-23 PROCEDURE — 73110 X-RAY EXAM OF WRIST: CPT | Performed by: EMERGENCY MEDICINE

## 2019-06-23 PROCEDURE — 29125 APPL SHORT ARM SPLINT STATIC: CPT

## 2019-06-23 PROCEDURE — 99214 OFFICE O/P EST MOD 30 MIN: CPT

## 2019-06-23 NOTE — ED PROVIDER NOTES
Patient Seen in: [facility] In [address] [place] [address]    History   Patient presents with:  Wrist Injury    Stated Complaint: wrist injury    HPI    The patient is a 79-year-old male who presents with left wrist pain after falling backwards off  0957]   /88   Pulse 62   Resp 18   Temp 98.1 °F (36.7 °C)   Temp src Oral   SpO2 97 %   O2 Device None (Room air)       Current:/88   Pulse 62   Temp 98.1 °F (36.7 °C) (Oral)   Resp 18   Ht 165.1 cm (5' 5\")   Wt 90.7 kg   SpO2 97%   BMI 33.28 including need for follow up                Disposition and Plan     Clinical Impression:  Left wrist fracture, closed, initial encounter  (primary encounter diagnosis)    Disposition:  Discharge  6/23/2019 10:31 am    Follow-up:  Camron Carrillo MD  31

## 2019-06-23 NOTE — ED INITIAL ASSESSMENT (HPI)
Pt was coming down the second to the last step on a ladder yesterday and fell. Pt has left wrist pain. +head injury no loc.

## 2019-06-24 PROBLEM — Z98.890 S/P ABLATION OF ATRIAL FLUTTER: Status: ACTIVE | Noted: 2019-06-12

## 2019-06-24 PROBLEM — Z79.01 LONG TERM CURRENT USE OF ANTICOAGULANT: Status: ACTIVE | Noted: 2019-06-12

## 2019-06-24 PROBLEM — Z86.79 ATRIAL FIBRILLATION, CURRENTLY IN SINUS RHYTHM: Status: ACTIVE | Noted: 2019-06-06

## 2019-06-24 PROBLEM — Z95.1 HX OF CABG: Status: ACTIVE | Noted: 2019-06-06

## 2019-06-24 PROBLEM — Z86.79 S/P ABLATION OF ATRIAL FLUTTER: Status: ACTIVE | Noted: 2019-06-12

## 2019-06-25 PROBLEM — S52.502A CLOSED FRACTURE OF LEFT DISTAL RADIUS: Status: ACTIVE | Noted: 2019-06-25

## 2019-06-28 ENCOUNTER — TELEPHONE (OUTPATIENT)
Dept: CARDIOLOGY | Age: 74
End: 2019-06-28

## 2019-06-28 ENCOUNTER — OFFICE VISIT (OUTPATIENT)
Dept: CARDIOLOGY | Age: 74
End: 2019-06-28
Attending: INTERNAL MEDICINE

## 2019-06-28 DIAGNOSIS — Z79.899 HIGH RISK MEDICATION USE: ICD-10-CM

## 2019-06-28 DIAGNOSIS — I48.19 PERSISTENT ATRIAL FIBRILLATION (CMD): Primary | ICD-10-CM

## 2019-06-28 DIAGNOSIS — Z79.899 LONG TERM CURRENT USE OF ANTIARRHYTHMIC DRUG: ICD-10-CM

## 2019-06-28 DIAGNOSIS — Z86.79 S/P ABLATION OF ATRIAL FLUTTER: ICD-10-CM

## 2019-06-28 DIAGNOSIS — I25.10 ATHEROSCLEROSIS OF NATIVE CORONARY ARTERY OF NATIVE HEART WITHOUT ANGINA PECTORIS: ICD-10-CM

## 2019-06-28 DIAGNOSIS — Z79.01 LONG TERM CURRENT USE OF ANTICOAGULANT: ICD-10-CM

## 2019-06-28 DIAGNOSIS — Z98.890 S/P ABLATION OF ATRIAL FLUTTER: ICD-10-CM

## 2019-06-28 DIAGNOSIS — I10 ESSENTIAL HYPERTENSION, BENIGN: ICD-10-CM

## 2019-06-28 DIAGNOSIS — Z86.79 ATRIAL FIBRILLATION, CURRENTLY IN SINUS RHYTHM: Primary | ICD-10-CM

## 2019-06-28 DIAGNOSIS — Z95.1 HX OF CABG: ICD-10-CM

## 2019-06-28 PROCEDURE — 93000 ELECTROCARDIOGRAM COMPLETE: CPT | Performed by: INTERNAL MEDICINE

## 2019-07-15 ENCOUNTER — TELEPHONE (OUTPATIENT)
Dept: INTERNAL MEDICINE CLINIC | Facility: CLINIC | Age: 74
End: 2019-07-15

## 2019-07-15 NOTE — TELEPHONE ENCOUNTER
Pt called in stating that he will be out of medication below. States to send to local [facility] pharmacy in [address] [place] [address] (confirmed). Please advise. Current Outpatient Medications:   •  Pantoprazole Sodium 40 MG Oral Tab EC, Take 40 mg by mouth once daily. ,

## 2019-07-15 NOTE — TELEPHONE ENCOUNTER
Advised patient he will need to address his med refills on his OV with Dr. Kathy Redd 7/19. Pt voiced understanding and agrees.

## 2019-07-19 ENCOUNTER — OFFICE VISIT (OUTPATIENT)
Dept: INTERNAL MEDICINE CLINIC | Facility: CLINIC | Age: 74
End: 2019-07-19
Payer: MEDICARE

## 2019-07-19 ENCOUNTER — LAB ENCOUNTER (OUTPATIENT)
Dept: LAB | Age: 74
End: 2019-07-19
Attending: INTERNAL MEDICINE
Payer: MEDICARE

## 2019-07-19 VITALS
DIASTOLIC BLOOD PRESSURE: 87 MMHG | TEMPERATURE: 96 F | HEIGHT: 67 IN | BODY MASS INDEX: 33.12 KG/M2 | WEIGHT: 211 LBS | RESPIRATION RATE: 12 BRPM | SYSTOLIC BLOOD PRESSURE: 119 MMHG | HEART RATE: 130 BPM

## 2019-07-19 DIAGNOSIS — E78.00 PURE HYPERCHOLESTEROLEMIA: ICD-10-CM

## 2019-07-19 DIAGNOSIS — Z86.39 HISTORY OF THYROID NODULE: ICD-10-CM

## 2019-07-19 DIAGNOSIS — I10 ESSENTIAL HYPERTENSION, BENIGN: ICD-10-CM

## 2019-07-19 DIAGNOSIS — J30.9 ALLERGIC RHINITIS, UNSPECIFIED SEASONALITY, UNSPECIFIED TRIGGER: ICD-10-CM

## 2019-07-19 DIAGNOSIS — R35.0 BENIGN PROSTATIC HYPERPLASIA WITH URINARY FREQUENCY: ICD-10-CM

## 2019-07-19 DIAGNOSIS — Z79.01 LONG TERM CURRENT USE OF ANTICOAGULANT: ICD-10-CM

## 2019-07-19 DIAGNOSIS — I25.10 CORONARY ARTERY DISEASE INVOLVING NATIVE CORONARY ARTERY OF NATIVE HEART WITHOUT ANGINA PECTORIS: ICD-10-CM

## 2019-07-19 DIAGNOSIS — I48.20 CHRONIC ATRIAL FIBRILLATION (HCC): ICD-10-CM

## 2019-07-19 DIAGNOSIS — Z00.00 MEDICARE ANNUAL WELLNESS VISIT, SUBSEQUENT: ICD-10-CM

## 2019-07-19 DIAGNOSIS — I35.9 AORTIC VALVE DISORDER: ICD-10-CM

## 2019-07-19 DIAGNOSIS — Z13.6 SCREENING FOR AAA (AORTIC ABDOMINAL ANEURYSM): ICD-10-CM

## 2019-07-19 DIAGNOSIS — N40.1 BENIGN PROSTATIC HYPERPLASIA WITH URINARY FREQUENCY: ICD-10-CM

## 2019-07-19 DIAGNOSIS — Z00.00 MEDICARE ANNUAL WELLNESS VISIT, SUBSEQUENT: Primary | ICD-10-CM

## 2019-07-19 PROBLEM — Z98.890 S/P ABLATION OF ATRIAL FLUTTER: Status: RESOLVED | Noted: 2019-06-12 | Resolved: 2019-07-19

## 2019-07-19 PROBLEM — Z95.1 HX OF CABG: Status: RESOLVED | Noted: 2019-06-06 | Resolved: 2019-07-19

## 2019-07-19 PROBLEM — S52.502A CLOSED FRACTURE OF LEFT DISTAL RADIUS: Status: RESOLVED | Noted: 2019-06-25 | Resolved: 2019-07-19

## 2019-07-19 PROBLEM — Z86.79 ATRIAL FIBRILLATION, CURRENTLY IN SINUS RHYTHM: Status: RESOLVED | Noted: 2019-06-06 | Resolved: 2019-07-19

## 2019-07-19 PROBLEM — Z86.79 S/P ABLATION OF ATRIAL FLUTTER: Status: RESOLVED | Noted: 2019-06-12 | Resolved: 2019-07-19

## 2019-07-19 LAB
ABSOLUTE IMMATURE GRANULOCYTES (OFFPRE24): NORMAL
ALBUMIN SERPL-MCNC: 4 G/DL
ALBUMIN SERPL-MCNC: 4 G/DL (ref 3.4–5)
ALBUMIN/GLOB SERPL: 1.3 {RATIO}
ALBUMIN/GLOB SERPL: 1.3 {RATIO} (ref 1–2)
ALP LIVER SERPL-CCNC: 102 U/L (ref 45–117)
ALP SERPL-CCNC: 102 U/L
ALT SERPL-CCNC: 28 U/L
ALT SERPL-CCNC: 28 U/L (ref 16–61)
ANION GAP SERPL CALC-SCNC: 7 MMOL/L
ANION GAP SERPL CALC-SCNC: 7 MMOL/L (ref 0–18)
AST SERPL-CCNC: 17 U/L
AST SERPL-CCNC: 17 U/L (ref 15–37)
BASO+EOS+MONOS # BLD: NORMAL 10*3/UL
BASO+EOS+MONOS NFR BLD: NORMAL %
BASOPHILS # BLD AUTO: 0.04 X10(3) UL (ref 0–0.2)
BASOPHILS # BLD: NORMAL 10*3/UL
BASOPHILS NFR BLD AUTO: 0.9 %
BASOPHILS NFR BLD: NORMAL %
BILIRUB SERPL-MCNC: 0.4 MG/DL
BILIRUB SERPL-MCNC: 0.4 MG/DL (ref 0.1–2)
BUN BLD-MCNC: 13 MG/DL (ref 7–18)
BUN SERPL-MCNC: 13 MG/DL
BUN/CREAT SERPL: 14.1
BUN/CREAT SERPL: 14.1 (ref 10–20)
CALCIUM BLD-MCNC: 9.9 MG/DL (ref 8.5–10.1)
CALCIUM SERPL-MCNC: 9.9 MG/DL
CHLORIDE SERPL-SCNC: 107 MMOL/L
CHLORIDE SERPL-SCNC: 107 MMOL/L (ref 98–112)
CHOLEST SERPL-MCNC: 168 MG/DL
CHOLEST SMN-MCNC: 168 MG/DL (ref ?–200)
CHOLEST/HDLC SERPL: 73 {RATIO}
CO2 SERPL-SCNC: 28 MMOL/L
CO2 SERPL-SCNC: 28 MMOL/L (ref 21–32)
CREAT BLD-MCNC: 0.92 MG/DL (ref 0.7–1.3)
CREAT SERPL-MCNC: 0.92 MG/DL
DEPRECATED RDW RBC AUTO: 47.6 FL (ref 35.1–46.3)
DIFFERENTIAL METHOD BLD: NORMAL
EOSINOPHIL # BLD AUTO: 0.24 X10(3) UL (ref 0–0.7)
EOSINOPHIL # BLD: NORMAL 10*3/UL
EOSINOPHIL NFR BLD AUTO: 5.6 %
EOSINOPHIL NFR BLD: NORMAL %
ERYTHROCYTE [DISTWIDTH] IN BLOOD BY AUTOMATED COUNT: 13.2 % (ref 11–15)
ERYTHROCYTE [DISTWIDTH] IN BLOOD: NORMAL %
GLOBULIN PLAS-MCNC: 3.1 G/DL (ref 2.8–4.4)
GLOBULIN SER-MCNC: 3.1 G/DL
GLUCOSE BLD-MCNC: 89 MG/DL (ref 70–99)
GLUCOSE SERPL-MCNC: 89 MG/DL
HCT VFR BLD AUTO: 43.6 % (ref 39–53)
HCT VFR BLD CALC: 43.6 %
HDLC SERPL-MCNC: 73 MG/DL (ref 40–59)
HDLC SERPL-MCNC: NORMAL MG/DL
HGB BLD-MCNC: 14.4 G/DL
HGB BLD-MCNC: 14.4 G/DL (ref 13–17.5)
IMM GRANULOCYTES # BLD AUTO: 0.01 X10(3) UL (ref 0–1)
IMM GRANULOCYTES NFR BLD: 0.2 %
IMMATURE GRANULOCYTES (OFFPRE25): NORMAL
LDLC SERPL CALC-MCNC: 80 MG/DL
LDLC SERPL CALC-MCNC: 80 MG/DL (ref ?–100)
LENGTH OF FAST TIME PATIENT: NORMAL H
LENGTH OF FAST TIME PATIENT: NORMAL H
LYMPHOCYTES # BLD AUTO: 0.86 X10(3) UL (ref 1–4)
LYMPHOCYTES # BLD: NORMAL 10*3/UL
LYMPHOCYTES NFR BLD AUTO: 20.2 %
LYMPHOCYTES NFR BLD: NORMAL %
M PROTEIN MFR SERPL ELPH: 7.1 G/DL (ref 6.4–8.2)
MCH RBC QN AUTO: 32.2 PG (ref 26–34)
MCH RBC QN AUTO: NORMAL PG
MCHC RBC AUTO-ENTMCNC: 33 G/DL (ref 31–37)
MCHC RBC AUTO-ENTMCNC: NORMAL G/DL
MCV RBC AUTO: 97.5 FL (ref 80–100)
MCV RBC AUTO: NORMAL FL
MONOCYTES # BLD AUTO: 0.49 X10(3) UL (ref 0.1–1)
MONOCYTES # BLD: NORMAL 10*3/UL
MONOCYTES NFR BLD AUTO: 11.5 %
MONOCYTES NFR BLD: NORMAL %
MPV (OFFPRE2): NORMAL
NEUTROPHILS # BLD AUTO: 2.61 X10 (3) UL (ref 1.5–7.7)
NEUTROPHILS # BLD AUTO: 2.61 X10(3) UL (ref 1.5–7.7)
NEUTROPHILS # BLD: NORMAL 10*3/UL
NEUTROPHILS NFR BLD AUTO: 61.6 %
NEUTROPHILS NFR BLD: NORMAL %
NONHDLC SERPL-MCNC: 95 MG/DL
NONHDLC SERPL-MCNC: 95 MG/DL (ref ?–130)
NRBC BLD MANUAL-RTO: NORMAL %
OSMOLALITY SERPL CALC.SUM OF ELEC: 294 MOSM/KG (ref 275–295)
PATIENT FASTING: YES
PATIENT FASTING: YES
PLAT MORPH BLD: NORMAL
PLATELET # BLD AUTO: 165 10(3)UL (ref 150–450)
PLATELET # BLD: 165 10*3/UL
POTASSIUM SERPL-SCNC: 4.2 MMOL/L
POTASSIUM SERPL-SCNC: 4.2 MMOL/L (ref 3.5–5.1)
PROT SERPL-MCNC: 7.1 G/DL
RBC # BLD AUTO: 4.47 X10(6)UL (ref 3.8–5.8)
RBC # BLD: 4.47 10*6/UL
RBC MORPH BLD: NORMAL
SODIUM SERPL-SCNC: 142 MMOL/L
SODIUM SERPL-SCNC: 142 MMOL/L (ref 136–145)
TRIGL SERPL-MCNC: 76 MG/DL
TRIGL SERPL-MCNC: 76 MG/DL (ref 30–149)
TSI SER-ACNC: 2.45 MIU/ML (ref 0.36–3.74)
VLDLC SERPL CALC-MCNC: 15 MG/DL
VLDLC SERPL CALC-MCNC: 15 MG/DL (ref 0–30)
WBC # BLD AUTO: 4.3 X10(3) UL (ref 4–11)
WBC # BLD: 4.3 10*3/UL
WBC MORPH BLD: NORMAL

## 2019-07-19 PROCEDURE — 84443 ASSAY THYROID STIM HORMONE: CPT

## 2019-07-19 PROCEDURE — 85025 COMPLETE CBC W/AUTO DIFF WBC: CPT

## 2019-07-19 PROCEDURE — G0439 PPPS, SUBSEQ VISIT: HCPCS | Performed by: INTERNAL MEDICINE

## 2019-07-19 PROCEDURE — 80061 LIPID PANEL: CPT

## 2019-07-19 PROCEDURE — 36415 COLL VENOUS BLD VENIPUNCTURE: CPT

## 2019-07-19 PROCEDURE — 80053 COMPREHEN METABOLIC PANEL: CPT

## 2019-07-19 RX ORDER — SPIRONOLACTONE 25 MG/1
25 TABLET ORAL
Refills: 2 | COMMUNITY
Start: 2019-07-07 | End: 2021-08-19

## 2019-07-19 NOTE — PROGRESS NOTES
HPI:   Tavon Martin is a 68year old male who presents for a Medicare Subsequent Annual Wellness visit (Pt already had Initial Annual Wellness).       His last annual assessment has been over 1 year: Annual Physical due on 09/07/1948         Fall/Risk Asse Team:  Alex Chandler MD as PCP - General (Internal Medicine)  Jayne Delgado MD as PCP - [facility]  Jammie Wooten MD as Consulting Physician (SURGERY, CARDIOVASCULAR)  Sergio Raza MD as Consulting Physician (Cardiovascular Diseases)  Faina Corbin 20 mg by mouth daily. At Bedtime   Sildenafil Citrate 20 MG Oral Tab Take 5 tablets 1-2 hours before sexual activity   aspirin 81 MG Oral Chew Tab Chew  by mouth daily.       MEDICAL INFORMATION:   He  has a past medical history of Allergic rhinitis, Athero denies abdominal pain, denies heartburn  : 0 per night nocturia, no complaint of urinary incontinence  MUSCULOSKELETAL: denies back pain  NEURO: denies headaches  PSYCHE: denies depression or anxiety  HEMATOLOGIC: denies hx of anemia  ENDOCRINE: + thyroi nodes normal   Neurologic: Normal            Vaccination History     There is no immunization history on file for this patient.      ASSESSMENT AND OTHER RELEVANT CHRONIC CONDITIONS:   Indigo Oro is a 68year old male who presents for a Medicare Assessme with allergy med.    (Z79.01) Long term current use of anticoagulant  Plan: on xarelto for his afib. Diet assessment: good     PLAN:  The patient indicates understanding of these issues and agrees to the plan.   Reinforced healthy diet, lifestyle, on 02/19/2020  Update Health Maintenance if applicable     Immunizations (Update Immunization Activity if applicable)     Influenza  Covered Annually    Please get every year    Pneumococcal 13 (Prevnar)  Covered Once after 65 No vaccine history found Luis

## 2019-07-20 ENCOUNTER — TELEPHONE (OUTPATIENT)
Dept: INTERNAL MEDICINE CLINIC | Facility: CLINIC | Age: 74
End: 2019-07-20

## 2019-07-20 PROBLEM — S52.616D CLOSED NONDISPLACED FRACTURE OF STYLOID PROCESS OF ULNA WITH ROUTINE HEALING: Status: ACTIVE | Noted: 2019-07-20

## 2019-07-20 RX ORDER — LEVOTHYROXINE SODIUM 0.1 MG/1
100 TABLET ORAL
Qty: 90 TABLET | Refills: 3 | Status: SHIPPED | OUTPATIENT
Start: 2019-07-20 | End: 2020-07-10

## 2019-07-30 ENCOUNTER — OFFICE VISIT (OUTPATIENT)
Dept: CARDIOLOGY | Age: 74
End: 2019-07-30
Attending: INTERNAL MEDICINE

## 2019-07-30 ENCOUNTER — TELEPHONE (OUTPATIENT)
Dept: CARDIOLOGY | Age: 74
End: 2019-07-30

## 2019-07-30 DIAGNOSIS — Z86.79 ATRIAL FIBRILLATION, CURRENTLY IN SINUS RHYTHM: Primary | ICD-10-CM

## 2019-07-30 PROCEDURE — 93000 ELECTROCARDIOGRAM COMPLETE: CPT | Performed by: INTERNAL MEDICINE

## 2019-08-05 PROBLEM — S52.502D CLOSED FRACTURE OF LOWER END OF LEFT RADIUS WITH ROUTINE HEALING: Status: ACTIVE | Noted: 2019-06-25

## 2019-08-05 RX ORDER — SOTALOL HYDROCHLORIDE 80 MG/1
80 TABLET ORAL 2 TIMES DAILY
Qty: 60 TABLET | Refills: 5 | Status: SHIPPED | OUTPATIENT
Start: 2019-08-05 | End: 2019-08-16 | Stop reason: DRUGHIGH

## 2019-08-06 ENCOUNTER — TELEPHONE (OUTPATIENT)
Dept: CARDIOLOGY | Age: 74
End: 2019-08-06

## 2019-08-16 ENCOUNTER — TELEPHONE (OUTPATIENT)
Dept: CARDIOLOGY | Age: 74
End: 2019-08-16

## 2019-08-16 RX ORDER — SOTALOL HYDROCHLORIDE 80 MG/1
40 TABLET ORAL 2 TIMES DAILY
Qty: 30 TABLET | Refills: 5 | Status: SHIPPED | COMMUNITY
Start: 2019-08-16 | End: 2019-12-09

## 2019-08-20 ENCOUNTER — OFFICE VISIT (OUTPATIENT)
Dept: CARDIOLOGY | Age: 74
End: 2019-08-20
Attending: INTERNAL MEDICINE

## 2019-08-20 ENCOUNTER — ANCILLARY PROCEDURE (OUTPATIENT)
Dept: CARDIOLOGY | Age: 74
End: 2019-08-20
Attending: INTERNAL MEDICINE

## 2019-08-20 DIAGNOSIS — Z86.79 ATRIAL FIBRILLATION, CURRENTLY IN SINUS RHYTHM: ICD-10-CM

## 2019-08-20 DIAGNOSIS — Z86.79 ATRIAL FIBRILLATION, CURRENTLY IN SINUS RHYTHM: Primary | ICD-10-CM

## 2019-08-20 PROCEDURE — 93000 ELECTROCARDIOGRAM COMPLETE: CPT | Performed by: INTERNAL MEDICINE

## 2019-08-21 ENCOUNTER — APPOINTMENT (OUTPATIENT)
Dept: CARDIOLOGY | Age: 74
End: 2019-08-21
Attending: INTERNAL MEDICINE

## 2019-08-23 ENCOUNTER — TELEPHONE (OUTPATIENT)
Dept: CARDIOLOGY | Age: 74
End: 2019-08-23

## 2019-08-23 PROCEDURE — 93227 XTRNL ECG REC<48 HR R&I: CPT | Performed by: INTERNAL MEDICINE

## 2019-08-26 ENCOUNTER — APPOINTMENT (OUTPATIENT)
Dept: CARDIOLOGY | Age: 74
End: 2019-08-26
Attending: INTERNAL MEDICINE

## 2019-09-10 RX ORDER — HYDROCORTISONE ACETATE 0.5 %
1 CREAM (GRAM) TOPICAL DAILY
COMMUNITY
Start: 2013-02-03

## 2019-09-10 RX ORDER — SILDENAFIL CITRATE 20 MG/1
TABLET ORAL
COMMUNITY
Start: 2018-01-25

## 2019-09-10 RX ORDER — MINOXIDIL 2 %
1 SOLUTION, NON-ORAL TOPICAL DAILY
COMMUNITY
Start: 2011-09-12 | End: 2020-09-16

## 2019-09-10 NOTE — H&P
[facility], [facility] - Gastroenterology                                                                                                  Clinic History and Physical NG: Anti-seizure medication   • TN (trigeminal neuralgia)       Past Surgical History:   Procedure Laterality Date   • ANGIOPLASTY (CORONARY)     • CABG      CABG and aortic valve replacement 2014   • CARDIOVERSION     • CATH ABLATION  03/2019    for a-flu Sennosides-Docusate Sodium (STOOL SOFTENER/LAXATIVE OR) Take by mouth. Disp:  Rfl:    XARELTO 20 MG Oral Tab Take 20 mg by mouth daily.  At Bedtime Disp:  Rfl: 2   Sildenafil Citrate 20 MG Oral Tab Take 5 tablets 1-2 hours before sexual activity Disp: 50 findings of sigmoid diverticulosis and hemorrhoids. Discussed preferably holding his Xarelto for 2 days to decrease risk of bleeding which is weighed against the risk of stroke.  Will inquire with his cardiologist to see if he is an acceptable risk to PHILL SANCHEZ

## 2019-09-11 ENCOUNTER — OFFICE VISIT (OUTPATIENT)
Dept: CARDIOLOGY | Age: 74
End: 2019-09-11

## 2019-09-11 ENCOUNTER — TELEPHONE (OUTPATIENT)
Dept: GASTROENTEROLOGY | Facility: CLINIC | Age: 74
End: 2019-09-11

## 2019-09-11 ENCOUNTER — OFFICE VISIT (OUTPATIENT)
Dept: GASTROENTEROLOGY | Facility: CLINIC | Age: 74
End: 2019-09-11
Payer: MEDICARE

## 2019-09-11 ENCOUNTER — TELEPHONE (OUTPATIENT)
Dept: CARDIOLOGY | Age: 74
End: 2019-09-11

## 2019-09-11 VITALS
HEIGHT: 67 IN | SYSTOLIC BLOOD PRESSURE: 111 MMHG | WEIGHT: 211 LBS | HEART RATE: 43 BPM | DIASTOLIC BLOOD PRESSURE: 64 MMHG | BODY MASS INDEX: 33.12 KG/M2

## 2019-09-11 VITALS
BODY MASS INDEX: 32.8 KG/M2 | HEIGHT: 67 IN | DIASTOLIC BLOOD PRESSURE: 74 MMHG | SYSTOLIC BLOOD PRESSURE: 122 MMHG | WEIGHT: 209 LBS

## 2019-09-11 DIAGNOSIS — R00.1 SINUS BRADYCARDIA: ICD-10-CM

## 2019-09-11 DIAGNOSIS — Z12.11 SCREENING FOR COLORECTAL CANCER: Primary | ICD-10-CM

## 2019-09-11 DIAGNOSIS — Z86.79 ATRIAL FIBRILLATION, CURRENTLY IN SINUS RHYTHM: Primary | ICD-10-CM

## 2019-09-11 DIAGNOSIS — Z12.11 COLON CANCER SCREENING: Primary | ICD-10-CM

## 2019-09-11 DIAGNOSIS — Z79.899 LONG TERM CURRENT USE OF ANTIARRHYTHMIC DRUG: ICD-10-CM

## 2019-09-11 DIAGNOSIS — E78.00 PURE HYPERCHOLESTEROLEMIA: ICD-10-CM

## 2019-09-11 DIAGNOSIS — Z98.890 S/P ABLATION OF ATRIAL FLUTTER: ICD-10-CM

## 2019-09-11 DIAGNOSIS — Z98.890 HISTORY OF COLONOSCOPY: ICD-10-CM

## 2019-09-11 DIAGNOSIS — Z86.79 S/P ABLATION OF ATRIAL FLUTTER: ICD-10-CM

## 2019-09-11 DIAGNOSIS — I10 ESSENTIAL HYPERTENSION, BENIGN: ICD-10-CM

## 2019-09-11 DIAGNOSIS — Z12.12 SCREENING FOR COLORECTAL CANCER: Primary | ICD-10-CM

## 2019-09-11 DIAGNOSIS — Z79.899 HIGH RISK MEDICATION USE: ICD-10-CM

## 2019-09-11 DIAGNOSIS — I25.10 ATHEROSCLEROSIS OF NATIVE CORONARY ARTERY OF NATIVE HEART WITHOUT ANGINA PECTORIS: ICD-10-CM

## 2019-09-11 DIAGNOSIS — Z79.01 LONG TERM CURRENT USE OF ANTICOAGULANT: ICD-10-CM

## 2019-09-11 PROCEDURE — 99214 OFFICE O/P EST MOD 30 MIN: CPT | Performed by: INTERNAL MEDICINE

## 2019-09-11 PROCEDURE — G0463 HOSPITAL OUTPT CLINIC VISIT: HCPCS | Performed by: INTERNAL MEDICINE

## 2019-09-11 PROCEDURE — 99203 OFFICE O/P NEW LOW 30 MIN: CPT | Performed by: INTERNAL MEDICINE

## 2019-09-11 RX ORDER — POLYETHYLENE GLYCOL 3350, SODIUM CHLORIDE, SODIUM BICARBONATE, POTASSIUM CHLORIDE 420; 11.2; 5.72; 1.48 G/4L; G/4L; G/4L; G/4L
POWDER, FOR SOLUTION ORAL
Qty: 1 BOTTLE | Refills: 0 | Status: ON HOLD | OUTPATIENT
Start: 2019-09-11 | End: 2019-11-18

## 2019-09-11 RX ORDER — SOTALOL HYDROCHLORIDE 80 MG/1
40 TABLET ORAL
COMMUNITY
Start: 2019-08-16 | End: 2020-07-17

## 2019-09-11 NOTE — TELEPHONE ENCOUNTER
Scheduled for:  Colonoscopy [address]  Provider Name: Dr. Marty Lam  Date:  11/18/19  Location:  [facility]  Sedation:  MAC  Time:   9003 (pt is aware to arrive at 0945)   Prep:  Trilyte  Meds/Allergies Reconciled?:  Physician reviewed   Diagnosis with codes:  Colon

## 2019-09-11 NOTE — PATIENT INSTRUCTIONS
1. Schedule colonoscopy with monitored anesthesia care (MAC) with Dr. Caity Lowry at the [facility] or [facility]    2.  bowel prep from pharmacy (split trilyte )    3.  I will contact your doctor to see if we can hold your Xarelto for 2 days prior to your pro

## 2019-09-11 NOTE — TELEPHONE ENCOUNTER
GI Staff:    Anay diaz the patient's cardiologist, Dr. Erasmo Mortensen regarding whether it would be ok to hold his Xarelto for 2 days prior to his colonoscopy    Thanks    Luis Judge MD  [facility] - Gastroenterology  9/11/2019  2:19 PM

## 2019-09-11 NOTE — TELEPHONE ENCOUNTER
Fax for Xarelto orders sent to Dr. Ted Jasso at [phone] with return confirmation.     Awaiting orders-

## 2019-09-12 NOTE — TELEPHONE ENCOUNTER
Request for xarelto orders faxed to Dr. Roberth Guallpa at [phone] ([phone]), fax confirmation received 9/12/19 @ [phone] [id number]. [facility] 11/18/2019.    -Awaiting orders at this time.

## 2019-09-17 ENCOUNTER — TELEPHONE (OUTPATIENT)
Dept: INTERNAL MEDICINE CLINIC | Facility: CLINIC | Age: 74
End: 2019-09-17

## 2019-09-20 ENCOUNTER — TELEPHONE (OUTPATIENT)
Dept: CARDIOLOGY | Age: 74
End: 2019-09-20

## 2019-09-23 NOTE — TELEPHONE ENCOUNTER
Fax received from [address] [place] [address] at Dr. Zuleyka Orr office stating the following:\"Per Shukri, may hold xarelto for two days prior to GI procedure. I spoke to with the patient. He verbalized understanding. Note faxed to Dr. Ramirez Situ [phone]. \" Sent to [facility]

## 2019-11-12 PROBLEM — R00.1 SINUS BRADYCARDIA: Status: ACTIVE | Noted: 2019-09-11

## 2019-11-18 ENCOUNTER — ANESTHESIA (OUTPATIENT)
Dept: ENDOSCOPY | Facility: HOSPITAL | Age: 74
End: 2019-11-18
Payer: MEDICARE

## 2019-11-18 ENCOUNTER — ANESTHESIA EVENT (OUTPATIENT)
Dept: ENDOSCOPY | Facility: HOSPITAL | Age: 74
End: 2019-11-18
Payer: MEDICARE

## 2019-11-18 ENCOUNTER — HOSPITAL ENCOUNTER (OUTPATIENT)
Facility: HOSPITAL | Age: 74
Setting detail: HOSPITAL OUTPATIENT SURGERY
Discharge: HOME | End: 2019-11-18
Attending: INTERNAL MEDICINE | Admitting: INTERNAL MEDICINE
Payer: MEDICARE

## 2019-11-18 DIAGNOSIS — Z12.11 COLON CANCER SCREENING: ICD-10-CM

## 2019-11-18 PROCEDURE — 45385 COLONOSCOPY W/LESION REMOVAL: CPT | Performed by: INTERNAL MEDICINE

## 2019-11-18 PROCEDURE — 0DBK8ZX EXCISION OF ASCENDING COLON, VIA NATURAL OR ARTIFICIAL OPENING ENDOSCOPIC, DIAGNOSTIC: ICD-10-PCS | Performed by: INTERNAL MEDICINE

## 2019-11-18 RX ORDER — SODIUM CHLORIDE, SODIUM LACTATE, POTASSIUM CHLORIDE, CALCIUM CHLORIDE 600; 310; 30; 20 MG/100ML; MG/100ML; MG/100ML; MG/100ML
INJECTION, SOLUTION INTRAVENOUS CONTINUOUS
Status: DISCONTINUED | OUTPATIENT
Start: 2019-11-18 | End: 2019-11-18

## 2019-11-18 RX ORDER — NALOXONE HYDROCHLORIDE 0.4 MG/ML
80 INJECTION, SOLUTION INTRAMUSCULAR; INTRAVENOUS; SUBCUTANEOUS AS NEEDED
Status: DISCONTINUED | OUTPATIENT
Start: 2019-11-18 | End: 2019-11-18

## 2019-11-18 RX ORDER — LIDOCAINE HYDROCHLORIDE 10 MG/ML
INJECTION, SOLUTION EPIDURAL; INFILTRATION; INTRACAUDAL; PERINEURAL AS NEEDED
Status: DISCONTINUED | OUTPATIENT
Start: 2019-11-18 | End: 2019-11-18 | Stop reason: SURG

## 2019-11-18 RX ADMIN — SODIUM CHLORIDE, SODIUM LACTATE, POTASSIUM CHLORIDE, CALCIUM CHLORIDE: 600; 310; 30; 20 INJECTION, SOLUTION INTRAVENOUS at 11:05:00

## 2019-11-18 RX ADMIN — LIDOCAINE HYDROCHLORIDE 50 MG: 10 INJECTION, SOLUTION EPIDURAL; INFILTRATION; INTRACAUDAL; PERINEURAL at 10:36:00

## 2019-11-18 NOTE — ANESTHESIA POSTPROCEDURE EVALUATION
Patient: Charleen Hooks    Procedure Summary     Date:  11/18/19 Room / Location:  [address] [place] [address] ENDOSCOPY 01 / [address] [place] [address] ENDOSCOPY    Anesthesia Start:  9208 Anesthesia Stop:  9859    Procedure:  COLONOSCOPY (N/A ) Diagnosis:       Colon cancer screening      (colon polyp

## 2019-11-18 NOTE — H&P
History & Physical Examination    Patient Name: Meli Stuart  MRN: [id number]  CSN: [id number]  YOB: 1945    Diagnosis: colorectal cancer screening    atorvastatin 40 MG Oral Tab, Take 40 mg by mouth once daily. , Disp: , Rfl: 2, 11/17/2019 at ANTIBIOTICS)    Past Medical History:   Diagnosis Date   • Allergic rhinitis    • Atherosclerosis of coronary artery    • Atrial fibrillation [facility])    • Atrial flutter (HCC)    • Benign prostatic hypertrophy    • Closed fracture of left distal radius 6/25/ Pack years: .5        Types: Cigarettes        Quit date: 10/5/1969        Years since quittin.1      Smokeless tobacco: Never Used    Alcohol use:  Yes      Alcohol/week: 0.8 standard drinks      Types: 1 Cans of beer per week      Comment: Juliet Gonzales

## 2019-11-18 NOTE — OPERATIVE REPORT
Colonoscopy Report    Jimmy Hutchinson    SYLVIA 1945 Age 69year old   PCP David Aviles MD Endoscopist Ernestine Madrid MD     Date of procedure: 19    Procedure: Colonoscopy w/ snare polypectomy    Pre-operative diagnosis: colorectal cancer throughout the procedure and remained stable.     Estimated blood loss: insignificant    Specimens collected:  Colon polyps    Complications: none     Colonoscopy findings:  Terminal ileum: normal mucosa    Cecum: normal mucosa and vascular pattern    [place]

## 2019-11-18 NOTE — ANESTHESIA PREPROCEDURE EVALUATION
Anesthesia PreOp Note    HPI:     Charliene Babinski is a 69year old male who presents for preoperative consultation requested by: Carmella Case MD    Date of Surgery: 11/18/2019    Procedure(s):  COLONOSCOPY  Indication: Colon cancer screening    Releva High cholesterol    • Hyperlipidemia    • Inguinal hernia recurrent unilateral 7/4/2013   • Pulmonary hypertension (HCC)    • Pulmonary hypertension (HCC)    • S/P ablation of atrial flutter 6/12/2019   • Sleep apnea    • Thyroid nodule     Per NG: surgery Sodium 40 MG Oral Tab EC, Take 40 mg by mouth., Disp: , Rfl: , Taking  amoxicillin 500 MG Oral Cap, TAKE 4 CAPSULES 1 HOUR PRIOR TO PROCEDURE., Disp: , Rfl: , Taking  Sildenafil Citrate 20 MG Oral Tab, Take 5 tablets 1-2 hours before sexual activity, Disp: drinks        Types: 1 Cans of beer per week        Comment: Occasionally      Drug use: Never      Sexual activity: Not on file    Lifestyle      Physical activity:        Days per week: Not on file        Minutes per session: Not on file      Stress: Not Anesthesia Plan:   ASA:  3  Plan:   MAC  Informed Consent Plan and Risks Discussed With:  Patient and spouse  Discussed plan with:  Surgeon      I have informed Elie Dang and/or legal guardian or family member of the nature of the anesthetic plan, be

## 2019-11-19 ENCOUNTER — TELEPHONE (OUTPATIENT)
Dept: GASTROENTEROLOGY | Facility: CLINIC | Age: 74
End: 2019-11-19

## 2019-11-19 VITALS
HEART RATE: 48 BPM | BODY MASS INDEX: 31.23 KG/M2 | RESPIRATION RATE: 14 BRPM | OXYGEN SATURATION: 98 % | DIASTOLIC BLOOD PRESSURE: 85 MMHG | SYSTOLIC BLOOD PRESSURE: 124 MMHG | TEMPERATURE: 97 F | HEIGHT: 67 IN | WEIGHT: 199 LBS

## 2019-11-19 NOTE — TELEPHONE ENCOUNTER
Sunday Webb MD  [facility] Clinical Staff             GI staff: please place recall for colonoscopy in 5 years

## 2019-11-19 NOTE — TELEPHONE ENCOUNTER
Entered into Epic:Recall colon in 5 years per . Last Colon done 11/18/2019, next due 11/18/24. HM updated.

## 2019-12-09 ENCOUNTER — OFFICE VISIT (OUTPATIENT)
Dept: CARDIOLOGY | Age: 74
End: 2019-12-09

## 2019-12-09 VITALS
BODY MASS INDEX: 32.02 KG/M2 | HEART RATE: 53 BPM | HEIGHT: 67 IN | DIASTOLIC BLOOD PRESSURE: 68 MMHG | SYSTOLIC BLOOD PRESSURE: 126 MMHG | OXYGEN SATURATION: 97 % | WEIGHT: 204 LBS

## 2019-12-09 DIAGNOSIS — E78.00 PURE HYPERCHOLESTEROLEMIA: ICD-10-CM

## 2019-12-09 DIAGNOSIS — I10 ESSENTIAL HYPERTENSION, BENIGN: ICD-10-CM

## 2019-12-09 DIAGNOSIS — I25.10 ATHEROSCLEROSIS OF NATIVE CORONARY ARTERY OF NATIVE HEART WITHOUT ANGINA PECTORIS: Primary | ICD-10-CM

## 2019-12-09 PROCEDURE — 99214 OFFICE O/P EST MOD 30 MIN: CPT | Performed by: INTERNAL MEDICINE

## 2019-12-09 SDOH — HEALTH STABILITY: MENTAL HEALTH: HOW MANY STANDARD DRINKS CONTAINING ALCOHOL DO YOU HAVE ON A TYPICAL DAY?: 1 OR 2

## 2019-12-09 SDOH — HEALTH STABILITY: MENTAL HEALTH: HOW OFTEN DO YOU HAVE A DRINK CONTAINING ALCOHOL?: MONTHLY OR LESS

## 2019-12-09 ASSESSMENT — PATIENT HEALTH QUESTIONNAIRE - PHQ9
SUM OF ALL RESPONSES TO PHQ9 QUESTIONS 1 AND 2: 0
1. LITTLE INTEREST OR PLEASURE IN DOING THINGS: NOT AT ALL
2. FEELING DOWN, DEPRESSED OR HOPELESS: NOT AT ALL
SUM OF ALL RESPONSES TO PHQ9 QUESTIONS 1 AND 2: 0

## 2019-12-11 ENCOUNTER — OFFICE VISIT (OUTPATIENT)
Dept: CARDIOLOGY | Age: 74
End: 2019-12-11
Attending: INTERNAL MEDICINE

## 2019-12-11 DIAGNOSIS — I48.91 ATRIAL FIBRILLATION, UNSPECIFIED TYPE (CMD): Primary | ICD-10-CM

## 2019-12-11 PROCEDURE — 93000 ELECTROCARDIOGRAM COMPLETE: CPT | Performed by: INTERNAL MEDICINE

## 2019-12-11 RX ORDER — CARBAMAZEPINE 400 MG/1
TABLET, EXTENDED RELEASE ORAL
Qty: 270 TABLET | Refills: 0 | Status: SHIPPED | OUTPATIENT
Start: 2019-12-11 | End: 2020-03-12

## 2019-12-11 RX ORDER — POLYETHYLENE GLYCOL 3350, SODIUM CHLORIDE, SODIUM BICARBONATE, POTASSIUM CHLORIDE 420; 11.2; 5.72; 1.48 G/4L; G/4L; G/4L; G/4L
POWDER, FOR SOLUTION ORAL
Refills: 0 | COMMUNITY
Start: 2019-09-11 | End: 2020-12-21

## 2019-12-11 RX ORDER — SOTALOL HYDROCHLORIDE 80 MG/1
40 TABLET ORAL
COMMUNITY
Start: 2019-08-16 | End: 2020-11-18 | Stop reason: CLARIF

## 2019-12-13 DIAGNOSIS — Z98.890 S/P ABLATION OF ATRIAL FLUTTER: ICD-10-CM

## 2019-12-13 DIAGNOSIS — I25.10 ATHEROSCLEROSIS OF NATIVE CORONARY ARTERY OF NATIVE HEART WITHOUT ANGINA PECTORIS: ICD-10-CM

## 2019-12-13 DIAGNOSIS — Z86.79 ATRIAL FIBRILLATION, CURRENTLY IN SINUS RHYTHM: ICD-10-CM

## 2019-12-13 DIAGNOSIS — Z86.79 S/P ABLATION OF ATRIAL FLUTTER: ICD-10-CM

## 2019-12-13 DIAGNOSIS — I10 ESSENTIAL HYPERTENSION, BENIGN: ICD-10-CM

## 2019-12-13 DIAGNOSIS — R00.1 SINUS BRADYCARDIA: ICD-10-CM

## 2019-12-13 DIAGNOSIS — E78.00 PURE HYPERCHOLESTEROLEMIA: ICD-10-CM

## 2019-12-13 DIAGNOSIS — Z79.01 LONG TERM CURRENT USE OF ANTICOAGULANT: ICD-10-CM

## 2019-12-13 DIAGNOSIS — Z79.899 HIGH RISK MEDICATION USE: ICD-10-CM

## 2019-12-13 DIAGNOSIS — Z79.899 LONG TERM CURRENT USE OF ANTIARRHYTHMIC DRUG: ICD-10-CM

## 2020-01-01 ENCOUNTER — EXTERNAL RECORD (OUTPATIENT)
Dept: HEALTH INFORMATION MANAGEMENT | Facility: OTHER | Age: 75
End: 2020-01-01

## 2020-01-30 RX ORDER — SILDENAFIL CITRATE 20 MG/1
TABLET ORAL
Qty: 50 TABLET | Refills: 2 | Status: SHIPPED | OUTPATIENT
Start: 2020-01-30 | End: 2020-10-01

## 2020-01-30 NOTE — TELEPHONE ENCOUNTER
Pt LOV with Dr. Rosalinda Duque 2/20/19 pt pharmacy requesting refill on sildenafil if you agree please review and sign med, I copied and pasted part of [facility] note below. Pt has breanne on 4/20/20 with [facility]       2.   Continue sildenafil 20 mg tablets, 5 tablets 1.5--2 hours

## 2020-03-12 NOTE — TELEPHONE ENCOUNTER
Review pended refill request as it does not fall under a protocol.   Requested Prescriptions     Pending Prescriptions Disp Refills   • CARBAMAZEPINE  MG Oral Tablet 12 Hr [Pharmacy Med Name: CARBAMAZEPINE  MG TABLET] 270 tablet 0     Sig: TAKE

## 2020-03-14 RX ORDER — CARBAMAZEPINE 400 MG/1
TABLET, EXTENDED RELEASE ORAL
Qty: 270 TABLET | Refills: 0 | Status: SHIPPED | OUTPATIENT
Start: 2020-03-14 | End: 2020-08-31

## 2020-03-25 ENCOUNTER — TELEPHONE (OUTPATIENT)
Dept: CARDIOLOGY | Age: 75
End: 2020-03-25

## 2020-04-20 ENCOUNTER — TELEPHONE (OUTPATIENT)
Dept: CARDIOLOGY | Age: 75
End: 2020-04-20

## 2020-05-14 ENCOUNTER — APPOINTMENT (OUTPATIENT)
Dept: CARDIOLOGY | Age: 75
End: 2020-05-14
Attending: INTERNAL MEDICINE

## 2020-06-22 ENCOUNTER — HOSPITAL ENCOUNTER (OUTPATIENT)
Dept: ULTRASOUND IMAGING | Age: 75
Discharge: HOME | End: 2020-06-22
Attending: INTERNAL MEDICINE
Payer: MEDICARE

## 2020-06-22 DIAGNOSIS — Z13.6 SCREENING FOR AAA (AORTIC ABDOMINAL ANEURYSM): ICD-10-CM

## 2020-06-22 PROCEDURE — 76706 US ABDL AORTA SCREEN AAA: CPT | Performed by: INTERNAL MEDICINE

## 2020-06-24 ENCOUNTER — TELEPHONE (OUTPATIENT)
Dept: CARDIOLOGY | Age: 75
End: 2020-06-24

## 2020-06-24 DIAGNOSIS — R00.1 SINUS BRADYCARDIA: ICD-10-CM

## 2020-06-24 DIAGNOSIS — Z01.812 PRE-PROCEDURE LAB EXAM: Primary | ICD-10-CM

## 2020-06-24 DIAGNOSIS — I10 ESSENTIAL HYPERTENSION, BENIGN: ICD-10-CM

## 2020-06-24 DIAGNOSIS — Z79.899 LONG TERM CURRENT USE OF ANTIARRHYTHMIC DRUG: ICD-10-CM

## 2020-07-01 ENCOUNTER — TELEPHONE (OUTPATIENT)
Dept: CARDIOLOGY | Age: 75
End: 2020-07-01

## 2020-07-10 ENCOUNTER — TELEPHONE (OUTPATIENT)
Dept: INTERNAL MEDICINE CLINIC | Facility: CLINIC | Age: 75
End: 2020-07-10

## 2020-07-10 RX ORDER — LEVOTHYROXINE SODIUM 0.1 MG/1
100 TABLET ORAL
Qty: 90 TABLET | Refills: 1 | Status: SHIPPED | OUTPATIENT
Start: 2020-07-10 | End: 2020-07-19

## 2020-07-10 NOTE — TELEPHONE ENCOUNTER
Levothyroxine Sodium 100 MCG Oral Tab, Take 1 tablet (100 mcg total) by mouth once daily. , Disp: 90 tablet, Rfl: 3

## 2020-07-17 ENCOUNTER — LAB ENCOUNTER (OUTPATIENT)
Dept: LAB | Age: 75
End: 2020-07-17
Attending: INTERNAL MEDICINE
Payer: MEDICARE

## 2020-07-17 ENCOUNTER — OFFICE VISIT (OUTPATIENT)
Dept: INTERNAL MEDICINE CLINIC | Facility: CLINIC | Age: 75
End: 2020-07-17
Payer: MEDICARE

## 2020-07-17 VITALS
SYSTOLIC BLOOD PRESSURE: 126 MMHG | HEIGHT: 67 IN | BODY MASS INDEX: 32.6 KG/M2 | TEMPERATURE: 99 F | HEART RATE: 54 BPM | WEIGHT: 207.69 LBS | DIASTOLIC BLOOD PRESSURE: 76 MMHG

## 2020-07-17 DIAGNOSIS — I48.20 CHRONIC ATRIAL FIBRILLATION (HCC): ICD-10-CM

## 2020-07-17 DIAGNOSIS — Z00.00 MEDICARE ANNUAL WELLNESS VISIT, SUBSEQUENT: ICD-10-CM

## 2020-07-17 DIAGNOSIS — E78.00 PURE HYPERCHOLESTEROLEMIA: ICD-10-CM

## 2020-07-17 DIAGNOSIS — I38 VALVULAR HEART DISEASE: ICD-10-CM

## 2020-07-17 DIAGNOSIS — Z00.00 MEDICARE ANNUAL WELLNESS VISIT, SUBSEQUENT: Primary | ICD-10-CM

## 2020-07-17 DIAGNOSIS — J30.9 ALLERGIC RHINITIS, UNSPECIFIED SEASONALITY, UNSPECIFIED TRIGGER: ICD-10-CM

## 2020-07-17 DIAGNOSIS — N40.1 BENIGN PROSTATIC HYPERPLASIA WITH URINARY FREQUENCY: ICD-10-CM

## 2020-07-17 DIAGNOSIS — I25.10 CORONARY ARTERY DISEASE INVOLVING NATIVE CORONARY ARTERY OF NATIVE HEART WITHOUT ANGINA PECTORIS: ICD-10-CM

## 2020-07-17 DIAGNOSIS — Z86.39 HISTORY OF THYROID NODULE: ICD-10-CM

## 2020-07-17 DIAGNOSIS — R35.0 BENIGN PROSTATIC HYPERPLASIA WITH URINARY FREQUENCY: ICD-10-CM

## 2020-07-17 DIAGNOSIS — G50.0 TRIGEMINAL NEURALGIA: ICD-10-CM

## 2020-07-17 DIAGNOSIS — Z12.5 PROSTATE CANCER SCREENING: ICD-10-CM

## 2020-07-17 DIAGNOSIS — I10 ESSENTIAL HYPERTENSION, BENIGN: ICD-10-CM

## 2020-07-17 LAB
ALBUMIN SERPL-MCNC: 3.8 G/DL
ALBUMIN SERPL-MCNC: 3.8 G/DL (ref 3.4–5)
ALBUMIN/GLOB SERPL: 1.2 {RATIO}
ALBUMIN/GLOB SERPL: 1.2 {RATIO} (ref 1–2)
ALP LIVER SERPL-CCNC: 99 U/L (ref 45–117)
ALP SERPL-CCNC: 99 U/L
ALT SERPL-CCNC: 28 U/L (ref 16–61)
ALT SERPL-CCNC: 28 UNITS/L
ANION GAP SERPL CALC-SCNC: 6 MMOL/L
ANION GAP SERPL CALC-SCNC: 6 MMOL/L (ref 0–18)
AST SERPL-CCNC: 19 U/L (ref 15–37)
AST SERPL-CCNC: 19 UNITS/L
BASOPHILS # BLD AUTO: 0.03 X10(3) UL (ref 0–0.2)
BASOPHILS NFR BLD AUTO: 0.8 %
BILIRUB SERPL-MCNC: 0.4 MG/DL
BILIRUB SERPL-MCNC: 0.4 MG/DL (ref 0.1–2)
BUN BLD-MCNC: 17 MG/DL (ref 7–18)
BUN SERPL-MCNC: 17 MG/DL
BUN/CREAT SERPL: 20
BUN/CREAT SERPL: 20 (ref 10–20)
CALCIUM BLD-MCNC: 9.9 MG/DL (ref 8.5–10.1)
CALCIUM SERPL-MCNC: 9.9 MG/DL
CHLORIDE SERPL-SCNC: 108 MMOL/L
CHLORIDE SERPL-SCNC: 108 MMOL/L (ref 98–112)
CHOLEST SERPL-MCNC: 172 MG/DL
CHOLEST SMN-MCNC: 172 MG/DL (ref ?–200)
CO2 SERPL-SCNC: 29 MMOL/L
CO2 SERPL-SCNC: 29 MMOL/L (ref 21–32)
COMPLEXED PSA SERPL-MCNC: 2.96 NG/ML (ref ?–4)
CREAT BLD-MCNC: 0.85 MG/DL (ref 0.7–1.3)
CREAT SERPL-MCNC: 0.85 MG/DL
DEPRECATED RDW RBC AUTO: 45.4 FL (ref 35.1–46.3)
EOSINOPHIL # BLD AUTO: 0.28 X10(3) UL (ref 0–0.7)
EOSINOPHIL NFR BLD AUTO: 7.2 %
ERYTHROCYTE [DISTWIDTH] IN BLOOD BY AUTOMATED COUNT: 12.6 % (ref 11–15)
GLOBULIN PLAS-MCNC: 3.3 G/DL (ref 2.8–4.4)
GLOBULIN SER-MCNC: 3.3 G/DL
GLUCOSE BLD-MCNC: 90 MG/DL (ref 70–99)
GLUCOSE SERPL-MCNC: 90 MG/DL
HCT VFR BLD AUTO: 41.8 % (ref 39–53)
HCT VFR BLD CALC: 41.8 %
HDLC SERPL-MCNC: 73 MG/DL
HDLC SERPL-MCNC: 73 MG/DL (ref 40–59)
HGB BLD-MCNC: 13.9 G/DL
HGB BLD-MCNC: 13.9 G/DL (ref 13–17.5)
IMM GRANULOCYTES # BLD AUTO: 0.01 X10(3) UL (ref 0–1)
IMM GRANULOCYTES NFR BLD: 0.3 %
LDLC SERPL CALC-MCNC: 87 MG/DL
LDLC SERPL CALC-MCNC: 87 MG/DL (ref ?–100)
LYMPHOCYTES # BLD AUTO: 0.75 X10(3) UL (ref 1–4)
LYMPHOCYTES NFR BLD AUTO: 19.3 %
M PROTEIN MFR SERPL ELPH: 7.1 G/DL (ref 6.4–8.2)
MCH RBC QN AUTO: 32.1 PG (ref 26–34)
MCHC RBC AUTO-ENTMCNC: 33.3 G/DL (ref 31–37)
MCV RBC AUTO: 96.5 FL (ref 80–100)
MONOCYTES # BLD AUTO: 0.41 X10(3) UL (ref 0.1–1)
MONOCYTES NFR BLD AUTO: 10.5 %
NEUTROPHILS # BLD AUTO: 2.41 X10 (3) UL (ref 1.5–7.7)
NEUTROPHILS # BLD AUTO: 2.41 X10(3) UL (ref 1.5–7.7)
NEUTROPHILS NFR BLD AUTO: 61.9 %
NONHDLC SERPL-MCNC: 99 MG/DL
NONHDLC SERPL-MCNC: 99 MG/DL (ref ?–130)
OSMOLALITY SERPL CALC.SUM OF ELEC: 297 MOSM/KG (ref 275–295)
PATIENT FASTING Y/N/NP: YES
PATIENT FASTING Y/N/NP: YES
PLATELET # BLD AUTO: 166 10(3)UL (ref 150–450)
PLATELET # BLD: 166 K/MCL
POTASSIUM SERPL-SCNC: 4.5 MMOL/L
POTASSIUM SERPL-SCNC: 4.5 MMOL/L (ref 3.5–5.1)
PROT SERPL-MCNC: 7.1 G/DL
RBC # BLD AUTO: 4.33 X10(6)UL (ref 3.8–5.8)
RBC # BLD: 4.33 10*6/UL
SODIUM SERPL-SCNC: 143 MMOL/L
SODIUM SERPL-SCNC: 143 MMOL/L (ref 136–145)
TRIGL SERPL-MCNC: 60 MG/DL
TRIGL SERPL-MCNC: 60 MG/DL (ref 30–149)
TSH SERPL-ACNC: 3.69 MCUNITS/ML
TSI SER-ACNC: 3.69 MIU/ML (ref 0.36–3.74)
VLDLC SERPL CALC-MCNC: 12 MG/DL
VLDLC SERPL CALC-MCNC: 12 MG/DL (ref 0–30)
WBC # BLD AUTO: 3.9 X10(3) UL (ref 4–11)
WBC # BLD: 3.9 K/MCL

## 2020-07-17 PROCEDURE — 85025 COMPLETE CBC W/AUTO DIFF WBC: CPT

## 2020-07-17 PROCEDURE — 80061 LIPID PANEL: CPT

## 2020-07-17 PROCEDURE — 84443 ASSAY THYROID STIM HORMONE: CPT

## 2020-07-17 PROCEDURE — 80053 COMPREHEN METABOLIC PANEL: CPT

## 2020-07-17 PROCEDURE — 36415 COLL VENOUS BLD VENIPUNCTURE: CPT

## 2020-07-17 PROCEDURE — G0439 PPPS, SUBSEQ VISIT: HCPCS | Performed by: INTERNAL MEDICINE

## 2020-07-17 NOTE — PROGRESS NOTES
HPI:   Sharifa Vital is a 69year old male who presents for a Medicare Subsequent Annual Wellness visit (Pt already had Initial Annual Wellness).       Annual Physical due on 07/19/2020        Fall/Risk Assessment   He has been screened for Falls and is low as Consulting Physician ([address] [place] [address])  Nicholas Garrido MD as Consulting Physician (PULMONARY DISEASES)    Patient Active Problem List:     BPH (benign prostatic hyperplasia)     CAD (coronary artery disease), native coronary artery     Valvula Tab, Chew  by mouth daily. Glucosamine-Chondroit-Vit C-Mn (GLUCOSAMINE 1500 COMPLEX) Oral Cap, Take 1 tablet by mouth daily.        MEDICAL INFORMATION:   He  has a past medical history of Allergic rhinitis, Atherosclerosis of coronary artery, Atrial fibri blurred vision or double vision  HEENT: denies nasal congestion, sinus pain or ST  LUNGS: denies shortness of breath with exertion  CARDIOVASCULAR: denies chest pain on exertion  GI: denies abdominal pain, denies heartburn; no rectal bleeding  : 2 tp 3 p I avoid social activities because I cannot hear well and fear I will reply improperly:  No   Family members and friends have told me they think I may have hearing loss:  Sometimes                  Visual Acuity                           General Appearance: still declining apparently he got very sick before. He was also advised to get his Tdap as well as Shingrix vaccine.   He is up-to-date with his colonoscopy.    (I48.20) Chronic atrial fibrillation ([facility] [place] [address]  Plan: Patient on oral anticoagulation and rate cont patient maintain a good energy level?: Appropriate Exercise  How would you describe your daily physical activity?: Moderate  How would you describe your current health state?: Good  How do you maintain positive mental well-being?: Social Interaction; Greg Vergara Factor VIII or IX concentrates   Clients of institutions for the mentally retarded   Persons who live in the same house as a HepB virus carrier   Homosexual men   Illicit injectable drug abusers     Tetanus Toxoid  Only covered with a cut with metal- TD an

## 2020-07-19 PROBLEM — G50.0 TRIGEMINAL NEURALGIA: Status: ACTIVE | Noted: 2020-07-19

## 2020-07-19 PROBLEM — Z12.5 PROSTATE CANCER SCREENING: Status: ACTIVE | Noted: 2019-07-19

## 2020-07-19 PROBLEM — R00.1 SINUS BRADYCARDIA: Status: RESOLVED | Noted: 2019-09-11 | Resolved: 2020-07-19

## 2020-07-19 PROBLEM — S52.502D CLOSED FRACTURE OF LOWER END OF LEFT RADIUS WITH ROUTINE HEALING: Status: RESOLVED | Noted: 2019-06-25 | Resolved: 2020-07-19

## 2020-07-19 PROBLEM — Z13.6 SCREENING FOR AAA (AORTIC ABDOMINAL ANEURYSM): Status: RESOLVED | Noted: 2019-07-19 | Resolved: 2020-07-19

## 2020-07-19 PROBLEM — Z00.00 MEDICARE ANNUAL WELLNESS VISIT, SUBSEQUENT: Status: ACTIVE | Noted: 2019-07-19

## 2020-07-19 PROBLEM — S52.616D CLOSED NONDISPLACED FRACTURE OF STYLOID PROCESS OF ULNA WITH ROUTINE HEALING: Status: RESOLVED | Noted: 2019-07-20 | Resolved: 2020-07-19

## 2020-07-19 RX ORDER — LEVOTHYROXINE SODIUM 0.1 MG/1
TABLET ORAL
Qty: 90 TABLET | Refills: 1 | Status: SHIPPED | OUTPATIENT
Start: 2020-07-19 | End: 2021-01-06

## 2020-08-31 ENCOUNTER — TELEPHONE (OUTPATIENT)
Dept: INTERNAL MEDICINE CLINIC | Facility: CLINIC | Age: 75
End: 2020-08-31

## 2020-08-31 RX ORDER — CARBAMAZEPINE 400 MG/1
400 TABLET, EXTENDED RELEASE ORAL 3 TIMES DAILY
Qty: 270 TABLET | Refills: 1 | Status: SHIPPED | OUTPATIENT
Start: 2020-08-31 | End: 2021-05-17

## 2020-08-31 NOTE — TELEPHONE ENCOUNTER
CARBAMAZEPINE  MG Oral Tablet 12 Hr, TAKE 1 TABLET BY MOUTH THREE TIMES A DAY, Disp: 270 tablet, Rfl: 0

## 2020-09-02 ENCOUNTER — OFFICE VISIT (OUTPATIENT)
Dept: SURGERY | Facility: CLINIC | Age: 75
End: 2020-09-02
Payer: MEDICARE

## 2020-09-02 VITALS
SYSTOLIC BLOOD PRESSURE: 113 MMHG | HEART RATE: 52 BPM | DIASTOLIC BLOOD PRESSURE: 72 MMHG | BODY MASS INDEX: 32 KG/M2 | WEIGHT: 204 LBS

## 2020-09-02 DIAGNOSIS — R36.1 HEMOSPERMIA: ICD-10-CM

## 2020-09-02 DIAGNOSIS — N40.1 BENIGN PROSTATIC HYPERPLASIA WITH URINARY FREQUENCY: Primary | ICD-10-CM

## 2020-09-02 DIAGNOSIS — Z12.5 PROSTATE CANCER SCREENING: ICD-10-CM

## 2020-09-02 DIAGNOSIS — Z79.01 ON RIVAROXABAN THERAPY: ICD-10-CM

## 2020-09-02 DIAGNOSIS — N52.01 ERECTILE DYSFUNCTION DUE TO ARTERIAL INSUFFICIENCY: ICD-10-CM

## 2020-09-02 DIAGNOSIS — R35.0 BENIGN PROSTATIC HYPERPLASIA WITH URINARY FREQUENCY: Primary | ICD-10-CM

## 2020-09-02 DIAGNOSIS — Z79.82 ASPIRIN LONG-TERM USE: ICD-10-CM

## 2020-09-02 DIAGNOSIS — Z80.42 FAMILY HISTORY OF PROSTATE CANCER IN FATHER: ICD-10-CM

## 2020-09-02 DIAGNOSIS — R35.1 NOCTURIA: ICD-10-CM

## 2020-09-02 LAB
BACTERIA UR QL AUTO: NEGATIVE /HPF
BILIRUB UR QL: NEGATIVE
COLOR UR: YELLOW
GLUCOSE UR-MCNC: NEGATIVE MG/DL
HGB UR QL STRIP.AUTO: NEGATIVE
LEUKOCYTE ESTERASE UR QL STRIP.AUTO: NEGATIVE
NITRITE UR QL STRIP.AUTO: NEGATIVE
PH UR: 5 [PH] (ref 5–8)
PROT UR-MCNC: 30 MG/DL
RBC #/AREA URNS AUTO: 4 /HPF
SP GR UR STRIP: 1.03 (ref 1–1.03)
UROBILINOGEN UR STRIP-ACNC: 2
WBC #/AREA URNS AUTO: 2 /HPF

## 2020-09-02 PROCEDURE — G0463 HOSPITAL OUTPT CLINIC VISIT: HCPCS | Performed by: UROLOGY

## 2020-09-02 PROCEDURE — 99214 OFFICE O/P EST MOD 30 MIN: CPT | Performed by: UROLOGY

## 2020-09-02 RX ORDER — SILDENAFIL 100 MG/1
TABLET, FILM COATED ORAL
Qty: 30 TABLET | Refills: 3 | Status: ON HOLD | OUTPATIENT
Start: 2020-09-02 | End: 2021-08-05

## 2020-09-02 NOTE — PATIENT INSTRUCTIONS
Peggy Schwab, M.D.      1.  Urine specimen today for complete urinalysis--we will notify you of the results    2. Sildenafil (generic Viagra) 100 mg tablet 1.5--2 hours before planned sexual activity.     3.  Visit

## 2020-09-02 NOTE — PROGRESS NOTES
HPI:    Patient ID: Tavon Martin is a 69year old male. HPI     BPH   Chronic. He is not taking any medication for this. Patient denies pelvic pain or discomfort. He feels this is stable.       Hemosperima   Problem started 01/2019.  Patient stated he h aspirin 81 mg daily and Xarelto 20 mg daily for A-fib. He presently denies any gross hematuria.  The patient feels this is stable.          Review of Previous Records:    Previously rising PSA which subsequently stabilized  11/28/2016 office visit with me; MG Oral Chew Tab Chew  by mouth daily. • Glucosamine-Chondroit-Vit C-Mn (GLUCOSAMINE 1500 COMPLEX) Oral Cap Take 1 tablet by mouth daily.        Allergies:  Cat Hair Extract            Comment:Other reaction(s): CAT HAIR STD EXTRACT  Sulfa Antibiotics Disease Maternal Grandfather         Per NG kidney removal   • Skin cancer Maternal Grandfather    • Prostate Cancer Paternal Grandfather    • Heart Disease Other         PEr NG: Family history of CAD   • Skin cancer Maternal Grandmother    • Cancer Matern normal  11/28/2016 PSA = 1.8  10/05/2015 PSA = 1.5  09/30/2014 PSA = 0.9  09/05/2013 PSA = 0.9  04/16/2013 PSA = 2.2  09/21/2011 PSA = 0.7         ASSESSMENT/PLAN:   Benign prostatic hyperplasia with urinary frequency  (primary encounter diagnosis)  Erecti trauma with foreplay which is likely benign.     (R35.1) Nocturia  Patient has current AUA score of 5, mild voiding dysfunction category; nocturia 2x. He is not taking any medication for this.  The patient feels this is stable and chooses to continue observ activity. 3.  Visit in 1 year. Please get blood draw for PSA and urinalysis urine test 1--10 days before visit. No sexual stimulation whatsoever for 5 days before the actual PSA blood draw.       Orders Placed This Encounter      Urinalysis, Specimen

## 2020-09-07 DIAGNOSIS — R31.29 MICROHEMATURIA: Primary | ICD-10-CM

## 2020-09-13 ENCOUNTER — TELEPHONE (OUTPATIENT)
Dept: SURGERY | Facility: CLINIC | Age: 75
End: 2020-09-13

## 2020-09-13 NOTE — TELEPHONE ENCOUNTER
----- Message from Vincent Duke MD sent at 9/7/2020  6:19 PM CDT -----  Urology nurses, please read the message below and then call the patient and review all of the information with him, and see if he has any questions or if he wants to see me in the

## 2020-09-15 NOTE — TELEPHONE ENCOUNTER
Phoned pt and spoke with him. He confirms he read 's message. All questions answered to pt's satisfaction. Pt would like fov to discuss possible cysto. fov arranged for 10/01/2020. Pt will complete c.t. scan as ordered, prior to appt.

## 2020-09-16 ENCOUNTER — HOSPITAL ENCOUNTER (OUTPATIENT)
Dept: CT IMAGING | Age: 75
Discharge: HOME | End: 2020-09-16
Attending: UROLOGY
Payer: MEDICARE

## 2020-09-16 ENCOUNTER — OFFICE VISIT (OUTPATIENT)
Dept: CARDIOLOGY | Age: 75
End: 2020-09-16

## 2020-09-16 VITALS
HEART RATE: 48 BPM | SYSTOLIC BLOOD PRESSURE: 126 MMHG | DIASTOLIC BLOOD PRESSURE: 88 MMHG | BODY MASS INDEX: 32.02 KG/M2 | WEIGHT: 204 LBS | HEIGHT: 67 IN

## 2020-09-16 DIAGNOSIS — Z86.79 S/P ABLATION OF ATRIAL FLUTTER: ICD-10-CM

## 2020-09-16 DIAGNOSIS — Z98.890 S/P ABLATION OF ATRIAL FLUTTER: ICD-10-CM

## 2020-09-16 DIAGNOSIS — E78.00 PURE HYPERCHOLESTEROLEMIA: ICD-10-CM

## 2020-09-16 DIAGNOSIS — I48.0 PAROXYSMAL ATRIAL FIBRILLATION (CMD): ICD-10-CM

## 2020-09-16 DIAGNOSIS — I25.10 ATHEROSCLEROSIS OF NATIVE CORONARY ARTERY OF NATIVE HEART WITHOUT ANGINA PECTORIS: Primary | ICD-10-CM

## 2020-09-16 DIAGNOSIS — I10 ESSENTIAL HYPERTENSION, BENIGN: ICD-10-CM

## 2020-09-16 DIAGNOSIS — R31.29 MICROHEMATURIA: ICD-10-CM

## 2020-09-16 DIAGNOSIS — Z79.01 LONG TERM CURRENT USE OF ANTICOAGULANT: ICD-10-CM

## 2020-09-16 DIAGNOSIS — Z95.1 HX OF CABG: ICD-10-CM

## 2020-09-16 DIAGNOSIS — Z79.899 LONG TERM CURRENT USE OF ANTIARRHYTHMIC DRUG: ICD-10-CM

## 2020-09-16 LAB — CREAT BLD-MCNC: 0.8 MG/DL (ref 0.7–1.3)

## 2020-09-16 PROCEDURE — 76376 3D RENDER W/INTRP POSTPROCES: CPT | Performed by: UROLOGY

## 2020-09-16 PROCEDURE — 82565 ASSAY OF CREATININE: CPT

## 2020-09-16 PROCEDURE — 99214 OFFICE O/P EST MOD 30 MIN: CPT | Performed by: INTERNAL MEDICINE

## 2020-09-16 PROCEDURE — 74178 CT ABD&PLV WO CNTR FLWD CNTR: CPT | Performed by: UROLOGY

## 2020-09-16 RX ORDER — CARBAMAZEPINE 400 MG/1
400 TABLET, EXTENDED RELEASE ORAL 2 TIMES DAILY
COMMUNITY
Start: 2020-08-31

## 2020-09-16 SDOH — HEALTH STABILITY: MENTAL HEALTH: HOW MANY STANDARD DRINKS CONTAINING ALCOHOL DO YOU HAVE ON A TYPICAL DAY?: 1 OR 2

## 2020-09-16 SDOH — HEALTH STABILITY: MENTAL HEALTH: HOW OFTEN DO YOU HAVE A DRINK CONTAINING ALCOHOL?: MONTHLY OR LESS

## 2020-09-16 ASSESSMENT — PATIENT HEALTH QUESTIONNAIRE - PHQ9
SUM OF ALL RESPONSES TO PHQ9 QUESTIONS 1 AND 2: 0
CLINICAL INTERPRETATION OF PHQ2 SCORE: NO FURTHER SCREENING NEEDED
SUM OF ALL RESPONSES TO PHQ9 QUESTIONS 1 AND 2: 0
CLINICAL INTERPRETATION OF PHQ9 SCORE: NO FURTHER SCREENING NEEDED
2. FEELING DOWN, DEPRESSED OR HOPELESS: NOT AT ALL
1. LITTLE INTEREST OR PLEASURE IN DOING THINGS: NOT AT ALL

## 2020-10-01 ENCOUNTER — OFFICE VISIT (OUTPATIENT)
Dept: SURGERY | Facility: CLINIC | Age: 75
End: 2020-10-01
Payer: MEDICARE

## 2020-10-01 VITALS
SYSTOLIC BLOOD PRESSURE: 145 MMHG | WEIGHT: 208 LBS | TEMPERATURE: 98 F | HEART RATE: 59 BPM | DIASTOLIC BLOOD PRESSURE: 60 MMHG | BODY MASS INDEX: 33 KG/M2

## 2020-10-01 DIAGNOSIS — R35.0 BENIGN PROSTATIC HYPERPLASIA WITH URINARY FREQUENCY: ICD-10-CM

## 2020-10-01 DIAGNOSIS — Z12.5 PROSTATE CANCER SCREENING: ICD-10-CM

## 2020-10-01 DIAGNOSIS — Z79.01 ON RIVAROXABAN THERAPY: ICD-10-CM

## 2020-10-01 DIAGNOSIS — Z79.82 ASPIRIN LONG-TERM USE: ICD-10-CM

## 2020-10-01 DIAGNOSIS — N40.1 BENIGN PROSTATIC HYPERPLASIA WITH URINARY FREQUENCY: ICD-10-CM

## 2020-10-01 DIAGNOSIS — N52.01 ERECTILE DYSFUNCTION DUE TO ARTERIAL INSUFFICIENCY: ICD-10-CM

## 2020-10-01 DIAGNOSIS — R31.29 MICROHEMATURIA: Primary | ICD-10-CM

## 2020-10-01 DIAGNOSIS — R35.1 NOCTURIA: ICD-10-CM

## 2020-10-01 DIAGNOSIS — Z80.42 FAMILY HISTORY OF PROSTATE CANCER IN FATHER: ICD-10-CM

## 2020-10-01 DIAGNOSIS — R36.1 HEMOSPERMIA: ICD-10-CM

## 2020-10-01 PROCEDURE — G0463 HOSPITAL OUTPT CLINIC VISIT: HCPCS | Performed by: UROLOGY

## 2020-10-01 PROCEDURE — 99214 OFFICE O/P EST MOD 30 MIN: CPT | Performed by: UROLOGY

## 2020-10-01 RX ORDER — DIAZEPAM 10 MG/1
TABLET ORAL
Qty: 1 TABLET | Refills: 0 | Status: ON HOLD | OUTPATIENT
Start: 2020-10-01 | End: 2021-08-05

## 2020-10-01 NOTE — PROGRESS NOTES
HPI:    Patient ID: Mango Saldaña is a 77year old male. HPI     Microhematuria   Problem started 09/02/2020 when UA RBC = 4. He presently denies any associated gross hematuria. Patient is unaware of anything that makes this better or worse.  He feels th presently denies any gross hematuria. The patient feels this is stable.      Family history of prostate cancer   Patient stated that both, his father and his paternal grandfather,  of prostate cancer at age 80 and 80-80, respectively.  He denies any ass TABLETS BY MOUTH AS NEEDED 1 OR 2 HOURS BEFORE SEXUAL ACTIVITY 50 tablet 2   • atorvastatin 40 MG Oral Tab Take 40 mg by mouth once daily. 2   • spironolactone 25 MG Oral Tab Take 25 mg by mouth once daily.   2   • amoxicillin 500 MG Oral Cap TAKE 4 CAPSUL • COLONOSCOPY  11/18/2019   • COLONOSCOPY N/A 11/18/2019    Performed by Clyde Torres MD at [facility]     • OTHER SURGICAL HISTORY      trigeminal neuralgia surgery   • REPLACE AORTIC VALVE W/BYP     • TONSILLECTOMY        F normal.   Nursing note reviewed. 10/01/20  1309   BP: 145/60   Pulse: 59   Temp: 98.1 °F (36.7 °C)   Weight: 208 lb (94.3 kg)       Body mass index is 32.58 kg/m².         LABORATORIES  09/16/2020 Creatinine = 0.80; GFR = 88   09/02/2020 UA blood when UA RBC = 4. He presently denies any associated gross hematuria. 09/16/2020 CT UROGRAM (W+WO) = Moderate cortical scarring inferior right renal pole laterally; Mild cortical scarring posterior interpolar left kidney;  No suspicious enhancing renal lesio Viagra) 100 mg tab 1 hour before sexual activity; denies side effects. He previously took Cialis 20 mg with no improvement. The patient feels this is stable and chooses to continue medication as prescribed.  I emphasized the importance of waiting 1.5-2 hour father and his paternal grandfather,  of prostate cancer at age 80 and 80-80, respectively. He denies any associated symptoms to suggest prostate cancer or prostatitis.             I explained to patient the risks, side effects, and alternatives, and I

## 2020-10-01 NOTE — PATIENT INSTRUCTIONS
Leta Winter M.D.    1.  Urine specimen today for cytology    2. Flexible office cystoscopy, no biopsy    3. You may continue your aspirin and Xarelto before the procedure    4.    You may eat breakfast and rosio

## 2020-10-07 ENCOUNTER — TELEPHONE (OUTPATIENT)
Dept: INTERNAL MEDICINE CLINIC | Facility: CLINIC | Age: 75
End: 2020-10-07

## 2020-10-07 ENCOUNTER — TELEPHONE (OUTPATIENT)
Dept: FAMILY MEDICINE CLINIC | Facility: CLINIC | Age: 75
End: 2020-10-07

## 2020-10-07 RX ORDER — RIVAROXABAN 20 MG/1
20 TABLET, FILM COATED ORAL DAILY
Qty: 30 TABLET | Refills: 2 | Status: SHIPPED | OUTPATIENT
Start: 2020-10-07 | End: 2020-12-29

## 2020-10-07 NOTE — TELEPHONE ENCOUNTER
Patient is running very low on his XARELTO 20 MG Oral Tab    He contacted pharmacy for a refill and they told him it will not be ready until next week. Because patient will be out of medication is there any way we can send a new script over?     Make sure t

## 2020-10-14 ENCOUNTER — DOCUMENTATION (OUTPATIENT)
Dept: CARDIOLOGY | Age: 75
End: 2020-10-14

## 2020-10-14 ENCOUNTER — ANCILLARY PROCEDURE (OUTPATIENT)
Dept: CARDIOLOGY | Age: 75
End: 2020-10-14
Attending: INTERNAL MEDICINE

## 2020-10-14 DIAGNOSIS — Z98.890 S/P ABLATION OF ATRIAL FLUTTER: ICD-10-CM

## 2020-10-14 DIAGNOSIS — Z95.1 HX OF CABG: ICD-10-CM

## 2020-10-14 DIAGNOSIS — I48.0 PAROXYSMAL ATRIAL FIBRILLATION (CMD): ICD-10-CM

## 2020-10-14 DIAGNOSIS — Z86.79 S/P ABLATION OF ATRIAL FLUTTER: ICD-10-CM

## 2020-10-14 DIAGNOSIS — I10 ESSENTIAL HYPERTENSION, BENIGN: ICD-10-CM

## 2020-10-14 DIAGNOSIS — E78.00 PURE HYPERCHOLESTEROLEMIA: ICD-10-CM

## 2020-10-14 DIAGNOSIS — Z79.01 LONG TERM CURRENT USE OF ANTICOAGULANT: ICD-10-CM

## 2020-10-14 DIAGNOSIS — I25.10 ATHEROSCLEROSIS OF NATIVE CORONARY ARTERY OF NATIVE HEART WITHOUT ANGINA PECTORIS: ICD-10-CM

## 2020-10-14 DIAGNOSIS — Z79.899 LONG TERM CURRENT USE OF ANTIARRHYTHMIC DRUG: ICD-10-CM

## 2020-10-14 PROCEDURE — X1094 NO CHARGE VISIT: HCPCS | Performed by: INTERNAL MEDICINE

## 2020-10-14 PROCEDURE — 93351 STRESS TTE COMPLETE: CPT | Performed by: INTERNAL MEDICINE

## 2020-10-15 ENCOUNTER — TELEPHONE (OUTPATIENT)
Dept: CARDIOLOGY | Age: 75
End: 2020-10-15

## 2020-10-16 ENCOUNTER — PROCEDURE (OUTPATIENT)
Dept: SURGERY | Facility: CLINIC | Age: 75
End: 2020-10-16
Payer: MEDICARE

## 2020-10-16 VITALS
TEMPERATURE: 98 F | SYSTOLIC BLOOD PRESSURE: 118 MMHG | DIASTOLIC BLOOD PRESSURE: 74 MMHG | WEIGHT: 208 LBS | HEART RATE: 54 BPM | RESPIRATION RATE: 16 BRPM | HEIGHT: 67 IN | BODY MASS INDEX: 32.65 KG/M2

## 2020-10-16 DIAGNOSIS — Z79.82 ASPIRIN LONG-TERM USE: ICD-10-CM

## 2020-10-16 DIAGNOSIS — R35.1 NOCTURIA: ICD-10-CM

## 2020-10-16 DIAGNOSIS — R36.1 HEMOSPERMIA: ICD-10-CM

## 2020-10-16 DIAGNOSIS — N52.01 ERECTILE DYSFUNCTION DUE TO ARTERIAL INSUFFICIENCY: ICD-10-CM

## 2020-10-16 DIAGNOSIS — N40.1 BENIGN PROSTATIC HYPERPLASIA WITH URINARY FREQUENCY: ICD-10-CM

## 2020-10-16 DIAGNOSIS — Z12.5 PROSTATE CANCER SCREENING: ICD-10-CM

## 2020-10-16 DIAGNOSIS — Z79.01 ON RIVAROXABAN THERAPY: ICD-10-CM

## 2020-10-16 DIAGNOSIS — R31.29 MICROHEMATURIA: Primary | ICD-10-CM

## 2020-10-16 DIAGNOSIS — R35.0 BENIGN PROSTATIC HYPERPLASIA WITH URINARY FREQUENCY: ICD-10-CM

## 2020-10-16 DIAGNOSIS — Z80.42 FAMILY HISTORY OF PROSTATE CANCER IN FATHER: ICD-10-CM

## 2020-10-16 PROCEDURE — 52000 CYSTOURETHROSCOPY: CPT | Performed by: UROLOGY

## 2020-10-16 PROCEDURE — G0463 HOSPITAL OUTPT CLINIC VISIT: HCPCS | Performed by: UROLOGY

## 2020-10-16 PROCEDURE — 99212 OFFICE O/P EST SF 10 MIN: CPT | Performed by: UROLOGY

## 2020-10-16 RX ORDER — CIPROFLOXACIN 500 MG/1
500 TABLET, FILM COATED ORAL ONCE
Status: DISCONTINUED | OUTPATIENT
Start: 2020-10-16 | End: 2021-08-05

## 2020-10-16 NOTE — PATIENT INSTRUCTIONS
Colt Hurst M.D.    1. If your urine is bloody, please drink enough liquids to dilute out the blood. If the urine doesn't show any signs of blood, you can drink your usual amount of liquids.   Continue your daily r

## 2020-10-16 NOTE — PROGRESS NOTES
PREOPERATIVE DIAGNOSIS:     Microhematuria      POSTOP DIAGNOSIS:               The same;  No tumors, stones, or CIS of the bladder or bladder neck     PROCEDURE:              Cystoscopy            ANESTHESIA:     2% Xylocaine jelly local;  also see above; 10/01/2020 Urine cytology = negative for high-grade urothelial carcinoma . He presently denies any associated episodes of gross hematuria. Patient is unaware of anything that makes this better or worse.  He feels this is stable as he is currently asymptomat or bone pain. Patient feels this is stable as he is currently asymptomatic.            So that we could have a meaningful and appropriate discussion with patient concerning further treatment options, I review with patient the past urological history and I r <1  12/20/2017 UA Occult blood = negative; RBC = 1; Microscopic not indicated; PSA = 1.9  11/28/2016 UA RBC = 1 normal  11/28/2016 PSA = 1.8  10/05/2015 PSA = 1.5  09/30/2014 PSA = 0.9  09/05/2013 PSA = 0.9  04/16/2013 PSA = 2.2  09/21/2011 PSA = 0.7       microhematuria is benign. I recommended to patient that they continue to have their blood pressure monitored, and to get yearly complete urinalysis with their primary physician.   If patient develops proteinuria, and/or rising blood pressure, please consid Nocturia  Chronic. Patient had previous AUA score of 6, mild voiding dysfunction category; nocturia 2x, on 10/01/2020 per chart review. He is not currently taking any medication for this.  The patient feels this is stable and chooses to continue observation you are doing    2. If you have burning with urination, please buy \"AZO\" over the counter medication for burning - take 1 tablet every 8 hours as needed for burning pain. It makes the color of the urine bright orange red.     3.  Continue sildenafil (gene

## 2020-11-14 ENCOUNTER — TELEPHONE (OUTPATIENT)
Dept: CARDIOLOGY | Age: 75
End: 2020-11-14

## 2020-11-18 ENCOUNTER — OFFICE VISIT (OUTPATIENT)
Dept: CARDIOLOGY | Age: 75
End: 2020-11-18

## 2020-11-18 VITALS
HEART RATE: 53 BPM | DIASTOLIC BLOOD PRESSURE: 88 MMHG | SYSTOLIC BLOOD PRESSURE: 136 MMHG | BODY MASS INDEX: 32.96 KG/M2 | HEIGHT: 67 IN | WEIGHT: 210 LBS

## 2020-11-18 DIAGNOSIS — Z86.79 S/P ABLATION OF ATRIAL FLUTTER: ICD-10-CM

## 2020-11-18 DIAGNOSIS — Z79.01 LONG TERM CURRENT USE OF ANTICOAGULANT: ICD-10-CM

## 2020-11-18 DIAGNOSIS — I48.0 PAROXYSMAL ATRIAL FIBRILLATION (CMD): Primary | ICD-10-CM

## 2020-11-18 DIAGNOSIS — Z98.890 S/P ABLATION OF ATRIAL FLUTTER: ICD-10-CM

## 2020-11-18 DIAGNOSIS — I25.10 ATHEROSCLEROSIS OF NATIVE CORONARY ARTERY OF NATIVE HEART WITHOUT ANGINA PECTORIS: ICD-10-CM

## 2020-11-18 DIAGNOSIS — R00.1 SINUS BRADYCARDIA: ICD-10-CM

## 2020-11-18 PROBLEM — Z79.899 LONG TERM CURRENT USE OF ANTIARRHYTHMIC DRUG: Status: RESOLVED | Noted: 2019-06-12 | Resolved: 2020-11-18

## 2020-11-18 PROCEDURE — 93000 ELECTROCARDIOGRAM COMPLETE: CPT | Performed by: INTERNAL MEDICINE

## 2020-11-18 PROCEDURE — 99214 OFFICE O/P EST MOD 30 MIN: CPT | Performed by: INTERNAL MEDICINE

## 2020-11-18 ASSESSMENT — PATIENT HEALTH QUESTIONNAIRE - PHQ9
CLINICAL INTERPRETATION OF PHQ2 SCORE: NO FURTHER SCREENING NEEDED
SUM OF ALL RESPONSES TO PHQ9 QUESTIONS 1 AND 2: 0
CLINICAL INTERPRETATION OF PHQ9 SCORE: NO FURTHER SCREENING NEEDED
1. LITTLE INTEREST OR PLEASURE IN DOING THINGS: NOT AT ALL
SUM OF ALL RESPONSES TO PHQ9 QUESTIONS 1 AND 2: 0
2. FEELING DOWN, DEPRESSED OR HOPELESS: NOT AT ALL

## 2020-11-19 ENCOUNTER — MED REC SCAN ONLY (OUTPATIENT)
Dept: INTERNAL MEDICINE CLINIC | Facility: CLINIC | Age: 75
End: 2020-11-19

## 2020-11-19 DIAGNOSIS — R00.1 SINUS BRADYCARDIA: ICD-10-CM

## 2020-11-19 DIAGNOSIS — I48.0 PAROXYSMAL ATRIAL FIBRILLATION (CMD): ICD-10-CM

## 2020-11-19 DIAGNOSIS — Z79.01 LONG TERM CURRENT USE OF ANTICOAGULANT: ICD-10-CM

## 2020-11-19 DIAGNOSIS — Z86.79 S/P ABLATION OF ATRIAL FLUTTER: ICD-10-CM

## 2020-11-19 DIAGNOSIS — Z98.890 S/P ABLATION OF ATRIAL FLUTTER: ICD-10-CM

## 2020-11-19 DIAGNOSIS — I25.10 ATHEROSCLEROSIS OF NATIVE CORONARY ARTERY OF NATIVE HEART WITHOUT ANGINA PECTORIS: ICD-10-CM

## 2020-11-27 ENCOUNTER — TELEPHONE (OUTPATIENT)
Dept: CARDIOLOGY | Age: 75
End: 2020-11-27

## 2020-11-27 DIAGNOSIS — Z86.79 ATRIAL FIBRILLATION, CURRENTLY IN SINUS RHYTHM: Primary | ICD-10-CM

## 2020-12-01 ENCOUNTER — OFFICE VISIT (OUTPATIENT)
Dept: CARDIOLOGY | Age: 75
End: 2020-12-01
Attending: INTERNAL MEDICINE

## 2020-12-01 DIAGNOSIS — Z86.79 ATRIAL FIBRILLATION, CURRENTLY IN SINUS RHYTHM: Primary | ICD-10-CM

## 2020-12-01 PROCEDURE — 93000 ELECTROCARDIOGRAM COMPLETE: CPT | Performed by: INTERNAL MEDICINE

## 2020-12-21 ENCOUNTER — OFFICE VISIT (OUTPATIENT)
Dept: CARDIOLOGY | Age: 75
End: 2020-12-21

## 2020-12-21 VITALS
OXYGEN SATURATION: 96 % | HEIGHT: 67 IN | HEART RATE: 56 BPM | BODY MASS INDEX: 33.27 KG/M2 | DIASTOLIC BLOOD PRESSURE: 72 MMHG | WEIGHT: 212 LBS | SYSTOLIC BLOOD PRESSURE: 116 MMHG

## 2020-12-21 DIAGNOSIS — E78.00 PURE HYPERCHOLESTEROLEMIA: ICD-10-CM

## 2020-12-21 DIAGNOSIS — I25.10 ATHEROSCLEROSIS OF NATIVE CORONARY ARTERY OF NATIVE HEART WITHOUT ANGINA PECTORIS: Primary | ICD-10-CM

## 2020-12-21 DIAGNOSIS — I10 ESSENTIAL HYPERTENSION, BENIGN: ICD-10-CM

## 2020-12-21 PROCEDURE — 99214 OFFICE O/P EST MOD 30 MIN: CPT | Performed by: INTERNAL MEDICINE

## 2020-12-21 SDOH — HEALTH STABILITY: MENTAL HEALTH: HOW MANY STANDARD DRINKS CONTAINING ALCOHOL DO YOU HAVE ON A TYPICAL DAY?: 1 OR 2

## 2020-12-21 SDOH — HEALTH STABILITY: MENTAL HEALTH: HOW OFTEN DO YOU HAVE A DRINK CONTAINING ALCOHOL?: MONTHLY OR LESS

## 2020-12-21 ASSESSMENT — PATIENT HEALTH QUESTIONNAIRE - PHQ9
CLINICAL INTERPRETATION OF PHQ2 SCORE: NO FURTHER SCREENING NEEDED
SUM OF ALL RESPONSES TO PHQ9 QUESTIONS 1 AND 2: 0
1. LITTLE INTEREST OR PLEASURE IN DOING THINGS: NOT AT ALL
2. FEELING DOWN, DEPRESSED OR HOPELESS: NOT AT ALL
SUM OF ALL RESPONSES TO PHQ9 QUESTIONS 1 AND 2: 0
CLINICAL INTERPRETATION OF PHQ9 SCORE: NO FURTHER SCREENING NEEDED

## 2020-12-29 RX ORDER — RIVAROXABAN 20 MG/1
20 TABLET, FILM COATED ORAL DAILY
Qty: 30 TABLET | Refills: 5 | Status: SHIPPED | OUTPATIENT
Start: 2020-12-29 | End: 2021-06-24

## 2021-01-04 ENCOUNTER — TELEPHONE (OUTPATIENT)
Dept: INTERNAL MEDICINE CLINIC | Facility: CLINIC | Age: 76
End: 2021-01-04

## 2021-01-04 NOTE — TELEPHONE ENCOUNTER
Fax received stating,\"Imaging Report\" US Aorta Screening 6/22/20. No result report received. Fax sent to them advising of above. Fax confirmation received.

## 2021-01-06 ENCOUNTER — OFFICE VISIT (OUTPATIENT)
Dept: CARDIOLOGY | Age: 76
End: 2021-01-06

## 2021-01-06 VITALS
HEART RATE: 54 BPM | HEIGHT: 67 IN | SYSTOLIC BLOOD PRESSURE: 128 MMHG | BODY MASS INDEX: 32.65 KG/M2 | WEIGHT: 208 LBS | DIASTOLIC BLOOD PRESSURE: 86 MMHG

## 2021-01-06 DIAGNOSIS — Z98.890 S/P ABLATION OF ATRIAL FLUTTER: ICD-10-CM

## 2021-01-06 DIAGNOSIS — Z86.79 S/P ABLATION OF ATRIAL FLUTTER: ICD-10-CM

## 2021-01-06 DIAGNOSIS — R00.1 SINUS BRADYCARDIA: ICD-10-CM

## 2021-01-06 DIAGNOSIS — Z79.01 LONG TERM CURRENT USE OF ANTICOAGULANT: ICD-10-CM

## 2021-01-06 DIAGNOSIS — I48.0 PAROXYSMAL ATRIAL FIBRILLATION (CMD): Primary | ICD-10-CM

## 2021-01-06 PROCEDURE — 99214 OFFICE O/P EST MOD 30 MIN: CPT | Performed by: INTERNAL MEDICINE

## 2021-01-06 RX ORDER — LEVOTHYROXINE SODIUM 0.1 MG/1
100 TABLET ORAL DAILY
Qty: 90 TABLET | Refills: 1 | Status: SHIPPED | OUTPATIENT
Start: 2021-01-06 | End: 2021-06-08

## 2021-01-06 ASSESSMENT — PATIENT HEALTH QUESTIONNAIRE - PHQ9
1. LITTLE INTEREST OR PLEASURE IN DOING THINGS: NOT AT ALL
CLINICAL INTERPRETATION OF PHQ2 SCORE: NO FURTHER SCREENING NEEDED
2. FEELING DOWN, DEPRESSED OR HOPELESS: NOT AT ALL
CLINICAL INTERPRETATION OF PHQ9 SCORE: NO FURTHER SCREENING NEEDED
SUM OF ALL RESPONSES TO PHQ9 QUESTIONS 1 AND 2: 0
SUM OF ALL RESPONSES TO PHQ9 QUESTIONS 1 AND 2: 0

## 2021-01-22 ENCOUNTER — TELEPHONE (OUTPATIENT)
Dept: CARDIOLOGY | Age: 76
End: 2021-01-22

## 2021-01-22 RX ORDER — ATORVASTATIN CALCIUM 80 MG/1
80 TABLET, FILM COATED ORAL DAILY
Qty: 90 TABLET | Refills: 3 | Status: SHIPPED | OUTPATIENT
Start: 2021-01-22

## 2021-01-25 ENCOUNTER — LAB ENCOUNTER (OUTPATIENT)
Dept: LAB | Age: 76
End: 2021-01-25
Attending: INTERNAL MEDICINE
Payer: MEDICARE

## 2021-01-25 ENCOUNTER — CLINICAL ABSTRACT (OUTPATIENT)
Dept: CARDIOLOGY | Age: 76
End: 2021-01-25

## 2021-01-25 DIAGNOSIS — I10 ESSENTIAL HYPERTENSION, MALIGNANT: Primary | ICD-10-CM

## 2021-01-25 DIAGNOSIS — E78.00 PURE HYPERCHOLESTEROLEMIA: ICD-10-CM

## 2021-01-25 LAB
ALT SERPL-CCNC: 33 U/L
ALT SERPL-CCNC: 33 UNITS/L
AST SERPL-CCNC: 18 U/L (ref 15–37)
AST SERPL-CCNC: 18 UNITS/L
CHOLEST SERPL-MCNC: 175 MG/DL
CHOLEST SMN-MCNC: 175 MG/DL (ref ?–200)
HDLC SERPL-MCNC: 79 MG/DL
HDLC SERPL-MCNC: 79 MG/DL (ref 40–59)
LDLC SERPL CALC-MCNC: 81 MG/DL
LDLC SERPL CALC-MCNC: 81 MG/DL (ref ?–100)
NONHDLC SERPL-MCNC: 96 MG/DL
NONHDLC SERPL-MCNC: 96 MG/DL (ref ?–130)
PATIENT FASTING Y/N/NP: YES
TRIGL SERPL-MCNC: 77 MG/DL
TRIGL SERPL-MCNC: 77 MG/DL (ref 30–149)
VLDLC SERPL CALC-MCNC: 15 MG/DL (ref 0–30)

## 2021-01-25 PROCEDURE — 36415 COLL VENOUS BLD VENIPUNCTURE: CPT

## 2021-01-25 PROCEDURE — 84460 ALANINE AMINO (ALT) (SGPT): CPT

## 2021-01-25 PROCEDURE — 80061 LIPID PANEL: CPT

## 2021-01-25 PROCEDURE — 84450 TRANSFERASE (AST) (SGOT): CPT

## 2021-01-29 ENCOUNTER — TELEPHONE (OUTPATIENT)
Dept: CARDIOLOGY | Age: 76
End: 2021-01-29

## 2021-01-29 DIAGNOSIS — E78.00 PURE HYPERCHOLESTEROLEMIA: Primary | ICD-10-CM

## 2021-02-13 DIAGNOSIS — Z23 NEED FOR VACCINATION: ICD-10-CM

## 2021-02-27 ENCOUNTER — OFFICE VISIT (OUTPATIENT)
Dept: DERMATOLOGY CLINIC | Facility: CLINIC | Age: 76
End: 2021-02-27
Payer: MEDICARE

## 2021-02-27 DIAGNOSIS — L82.1 SEBORRHEIC KERATOSES: ICD-10-CM

## 2021-02-27 DIAGNOSIS — D23.9 BENIGN NEOPLASM OF SKIN, UNSPECIFIED LOCATION: ICD-10-CM

## 2021-02-27 DIAGNOSIS — B07.9 VERRUCA(E): Primary | ICD-10-CM

## 2021-02-27 PROCEDURE — 99203 OFFICE O/P NEW LOW 30 MIN: CPT | Performed by: DERMATOLOGY

## 2021-02-27 RX ORDER — FLUOROURACIL 50 MG/G
CREAM TOPICAL
Qty: 40 G | Refills: 1 | Status: SHIPPED | OUTPATIENT
Start: 2021-02-27

## 2021-02-28 NOTE — PROGRESS NOTES
Charliene Babinski is a 77year old male.   HPI:     CC:  Patient presents with:  Rash: LOV 09- Patient present with derm issue with left hand ,index finger no pain or itch - crusty and hard - not using anything on it - patient denies personal hx of skin Other (Other) Mother         vascular dementia   • Kidney Disease Maternal Grandfather         Per NG kidney removal   • Skin cancer Maternal Grandfather    • Prostate Cancer Paternal Grandfather    • Heart Disease Other         PEr NG: Family history of C you must have a .  1 tablet 0     Allergies:     Cat Hair Extract            Comment:Other reaction(s): CAT HAIR STD EXTRACT  Sulfa Antibiotics           Comment:Other reaction(s): SULFA (SULFONAMIDE ANTIBIOTICS)    Past Medical History:   Diagnosis D file      Food insecurity        Worry: Not on file        Inability: Not on file      Transportation needs        Medical: Not on file        Non-medical: Not on file    Tobacco Use      Smoking status: Former Smoker        Packs/day: 0.25        Years: 2 Disease Other         PEr NG: Family history of CAD   • Skin cancer Maternal Grandmother    • Cancer Maternal Grandmother    • Cancer Sister         unknown cancer   • Other (Other) Sister         eczema       There were no vitals filed for this visit. dermoscopy benign-appearing patterns. Waxy tannish keratotic papules scattered, cherry-red vascular papules scattered. See map today's date for lesions noted . Otherwise remarkable for lesions as noted on map.   See Assessment /Plan for addition other benign skin lesions. Signs and symptoms of skin cancer, ABCDE's of melanoma discussed with patient. Sunscreen use, sun protection, self exams reviewed. Followup as noted RTC routine checkup 6 mos - one year or p.r.n.

## 2021-04-12 ENCOUNTER — TELEPHONE (OUTPATIENT)
Dept: INTERNAL MEDICINE CLINIC | Facility: CLINIC | Age: 76
End: 2021-04-12

## 2021-04-12 NOTE — TELEPHONE ENCOUNTER
Patient states medication was prescribed by Cardiologist, requesting refills come from Dr. Belinda Mosqueda.

## 2021-04-12 NOTE — TELEPHONE ENCOUNTER
Patient states he is currently in [place] is requesting a refill to pharmacy listed. spironolactone 25 MG Oral Tab  2 7/7/2019    Sig:   Take 25 mg by mouth once daily.      Route:   Oral     Class:   Historical     Order #: [id number]

## 2021-04-13 RX ORDER — SPIRONOLACTONE 25 MG/1
25 TABLET ORAL DAILY
Qty: 30 TABLET | Refills: 0 | Status: SHIPPED | OUTPATIENT
Start: 2021-04-13 | End: 2021-04-13

## 2021-04-13 RX ORDER — SPIRONOLACTONE 25 MG/1
TABLET ORAL
Qty: 90 TABLET | Refills: 0 | Status: SHIPPED | OUTPATIENT
Start: 2021-04-13 | End: 2021-07-06

## 2021-05-17 RX ORDER — CARBAMAZEPINE 400 MG/1
TABLET, EXTENDED RELEASE ORAL
Qty: 270 TABLET | Refills: 1 | Status: SHIPPED | OUTPATIENT
Start: 2021-05-17 | End: 2021-11-18

## 2021-06-08 RX ORDER — LEVOTHYROXINE SODIUM 0.1 MG/1
TABLET ORAL
Qty: 90 TABLET | Refills: 1 | Status: SHIPPED | OUTPATIENT
Start: 2021-06-08 | End: 2022-01-18

## 2021-06-17 ENCOUNTER — LAB ENCOUNTER (OUTPATIENT)
Dept: LAB | Age: 76
End: 2021-06-17
Attending: INTERNAL MEDICINE
Payer: MEDICARE

## 2021-06-17 ENCOUNTER — TELEPHONE (OUTPATIENT)
Dept: CARDIOLOGY | Age: 76
End: 2021-06-17

## 2021-06-17 ENCOUNTER — OFFICE VISIT (OUTPATIENT)
Dept: CARDIOLOGY | Age: 76
End: 2021-06-17
Attending: INTERNAL MEDICINE

## 2021-06-17 DIAGNOSIS — I25.10 CORONARY ATHEROSCLEROSIS OF NATIVE CORONARY ARTERY: Primary | ICD-10-CM

## 2021-06-17 DIAGNOSIS — Z95.1 POSTSURGICAL AORTOCORONARY BYPASS STATUS: ICD-10-CM

## 2021-06-17 DIAGNOSIS — Z86.79 PERSONAL HISTORY OF UNSPECIFIED CIRCULATORY DISEASE: ICD-10-CM

## 2021-06-17 DIAGNOSIS — Z98.890 PERSONAL HISTORY OF SURGERY TO HEART AND GREAT VESSELS, PRESENTING HAZARDS TO HEALTH: ICD-10-CM

## 2021-06-17 DIAGNOSIS — I48.0 PAROXYSMAL ATRIAL FIBRILLATION (HCC): ICD-10-CM

## 2021-06-17 DIAGNOSIS — E78.00 PURE HYPERCHOLESTEROLEMIA: ICD-10-CM

## 2021-06-17 DIAGNOSIS — Z79.01 LONG TERM (CURRENT) USE OF ANTICOAGULANTS: ICD-10-CM

## 2021-06-17 DIAGNOSIS — Z79.899 NEED FOR PROPHYLACTIC CHEMOTHERAPY: ICD-10-CM

## 2021-06-17 DIAGNOSIS — I10 HTN (HYPERTENSION): ICD-10-CM

## 2021-06-17 DIAGNOSIS — I48.0 PAROXYSMAL ATRIAL FIBRILLATION (CMD): Primary | ICD-10-CM

## 2021-06-17 LAB
ABSOLUTE IMMATURE GRANULOCYTES (OFFPRE24): NORMAL
ABSOLUTE NRBC (AUTO): NORMAL
ALBUMIN SERPL-MCNC: 3.7 G/DL
ALBUMIN/GLOB SERPL: NORMAL {RATIO}
ALP SERPL-CCNC: NORMAL U/L
ALT SERPL-CCNC: 23 UNITS/L
ANION GAP SERPL CALC-SCNC: NORMAL MMOL/L
AST SERPL-CCNC: 24 UNITS/L
BASO+EOS+MONOS # BLD: NORMAL 10*3/UL
BASO+EOS+MONOS NFR BLD: NORMAL %
BASOPHILS # BLD: NORMAL 10*3/UL
BASOPHILS NFR BLD: NORMAL %
BILIRUB SERPL-MCNC: 0.3 MG/DL
BUN SERPL-MCNC: 10 MG/DL
BUN/CREAT SERPL: NORMAL
CALCIUM SERPL-MCNC: 9.6 MG/DL
CALCIUM, CORRECTED: NORMAL
CHLORIDE SERPL-SCNC: 110 MMOL/L
CO2 SERPL-SCNC: NORMAL MMOL/L
CREAT SERPL-MCNC: 0.82 MG/DL
DIFFERENTIAL METHOD BLD: NORMAL
EOSINOPHIL # BLD: NORMAL 10*3/UL
EOSINOPHIL NFR BLD: NORMAL %
ERYTHROCYTE [DISTWIDTH] IN BLOOD BY AUTOMATED COUNT: NORMAL %
ERYTHROCYTE [DISTWIDTH] IN BLOOD: NORMAL %
GLOBULIN SER-MCNC: 3.1 G/DL
GLUCOSE SERPL-MCNC: 79 MG/DL
HCT CALC (HGB X3) (OFFPRE23): NORMAL
HCT VFR BLD CALC: 39.4 %
HGB BLD-MCNC: 12.8 G/DL
IMMATURE GRANULOCYTES (OFFPRE25): NORMAL
LENGTH OF FAST TIME PATIENT: NORMAL H
LYMPHOCYTES # BLD: NORMAL 10*3/UL
LYMPHOCYTES NFR BLD: NORMAL %
MCH RBC QN AUTO: 30.4 PG
MCHC RBC AUTO-ENTMCNC: 32.5 G/DL
MCV RBC AUTO: 93.6 FL
MONOCYTES # BLD: NORMAL 10*3/UL
MONOCYTES NFR BLD: NORMAL %
MPV (OFFPRE2): NORMAL
NEUTROPHILS # BLD: NORMAL 10*3/UL
NEUTROPHILS NFR BLD: NORMAL %
NRBC BLD MANUAL-RTO: NORMAL %
PLAT MORPH BLD: NORMAL
PLATELET # BLD: 178 K/MCL
POTASSIUM SERPL-SCNC: 4.1 MMOL/L
PROT SERPL-MCNC: 6.8 G/DL
RBC # BLD: 4.21 10*6/UL
RBC MORPH BLD: NORMAL
SODIUM SERPL-SCNC: 142 MMOL/L
WBC # BLD: 5.5 K/MCL
WBC MORPH BLD: NORMAL

## 2021-06-17 PROCEDURE — 80053 COMPREHEN METABOLIC PANEL: CPT

## 2021-06-17 PROCEDURE — 93000 ELECTROCARDIOGRAM COMPLETE: CPT | Performed by: INTERNAL MEDICINE

## 2021-06-17 PROCEDURE — 85025 COMPLETE CBC W/AUTO DIFF WBC: CPT

## 2021-06-17 PROCEDURE — 36415 COLL VENOUS BLD VENIPUNCTURE: CPT

## 2021-06-18 ENCOUNTER — CLINICAL ABSTRACT (OUTPATIENT)
Dept: CARDIOLOGY | Age: 76
End: 2021-06-18

## 2021-06-24 RX ORDER — RIVAROXABAN 20 MG/1
TABLET, FILM COATED ORAL
Qty: 30 TABLET | Refills: 5 | Status: SHIPPED | OUTPATIENT
Start: 2021-06-24 | End: 2021-12-27

## 2021-07-06 RX ORDER — SPIRONOLACTONE 25 MG/1
TABLET ORAL
Qty: 90 TABLET | Refills: 0 | Status: SHIPPED | OUTPATIENT
Start: 2021-07-06 | End: 2021-08-04

## 2021-07-29 ENCOUNTER — TELEPHONE (OUTPATIENT)
Dept: CARDIOLOGY | Age: 76
End: 2021-07-29

## 2021-08-04 ENCOUNTER — APPOINTMENT (OUTPATIENT)
Dept: CV DIAGNOSTICS | Facility: HOSPITAL | Age: 76
DRG: 308 | End: 2021-08-04
Attending: HOSPITALIST
Payer: MEDICARE

## 2021-08-04 ENCOUNTER — APPOINTMENT (OUTPATIENT)
Dept: INTERVENTIONAL RADIOLOGY/VASCULAR | Facility: HOSPITAL | Age: 76
DRG: 308 | End: 2021-08-04
Attending: INTERNAL MEDICINE
Payer: MEDICARE

## 2021-08-04 ENCOUNTER — HOSPITAL ENCOUNTER (INPATIENT)
Facility: HOSPITAL | Age: 76
LOS: 1 days | Discharge: HOME | DRG: 308 | End: 2021-08-05
Attending: EMERGENCY MEDICINE | Admitting: HOSPITALIST
Payer: MEDICARE

## 2021-08-04 ENCOUNTER — APPOINTMENT (OUTPATIENT)
Dept: GENERAL RADIOLOGY | Facility: HOSPITAL | Age: 76
DRG: 308 | End: 2021-08-04
Attending: EMERGENCY MEDICINE
Payer: MEDICARE

## 2021-08-04 ENCOUNTER — ANESTHESIA EVENT (OUTPATIENT)
Dept: INTERVENTIONAL RADIOLOGY/VASCULAR | Facility: HOSPITAL | Age: 76
DRG: 308 | End: 2021-08-04
Payer: MEDICARE

## 2021-08-04 DIAGNOSIS — I47.2 NSVT (NONSUSTAINED VENTRICULAR TACHYCARDIA) (HCC): ICD-10-CM

## 2021-08-04 DIAGNOSIS — I48.91 ATRIAL FIBRILLATION WITH RAPID VENTRICULAR RESPONSE (HCC): Primary | ICD-10-CM

## 2021-08-04 PROBLEM — I47.29 NSVT (NONSUSTAINED VENTRICULAR TACHYCARDIA): Status: ACTIVE | Noted: 2021-08-04

## 2021-08-04 PROBLEM — I47.29 NSVT (NONSUSTAINED VENTRICULAR TACHYCARDIA) (HCC): Status: ACTIVE | Noted: 2021-08-04

## 2021-08-04 LAB
ANION GAP SERPL CALC-SCNC: 5 MMOL/L (ref 0–18)
BASOPHILS # BLD AUTO: 0.04 X10(3) UL (ref 0–0.2)
BASOPHILS NFR BLD AUTO: 0.6 %
BUN BLD-MCNC: 14 MG/DL (ref 7–18)
BUN/CREAT SERPL: 15.9 (ref 10–20)
CALCIUM BLD-MCNC: 9.3 MG/DL (ref 8.5–10.1)
CARBAMAZEPINE SERPL-MCNC: 8.8 UG/ML (ref 4–12)
CHLORIDE SERPL-SCNC: 107 MMOL/L (ref 98–112)
CHOLEST SMN-MCNC: 133 MG/DL (ref ?–200)
CO2 SERPL-SCNC: 27 MMOL/L (ref 21–32)
CREAT BLD-MCNC: 0.88 MG/DL
D DIMER PPP FEU-MCNC: 0.37 UG/ML FEU (ref ?–0.74)
DEPRECATED RDW RBC AUTO: 46.1 FL (ref 35.1–46.3)
DEPRECATED RDW RBC AUTO: 47.1 FL (ref 35.1–46.3)
EOSINOPHIL # BLD AUTO: 0.21 X10(3) UL (ref 0–0.7)
EOSINOPHIL NFR BLD AUTO: 3 %
ERYTHROCYTE [DISTWIDTH] IN BLOOD BY AUTOMATED COUNT: 13.7 % (ref 11–15)
ERYTHROCYTE [DISTWIDTH] IN BLOOD BY AUTOMATED COUNT: 13.7 % (ref 11–15)
GLUCOSE BLD-MCNC: 126 MG/DL (ref 70–99)
HAV IGM SER QL: 2 MG/DL (ref 1.6–2.6)
HCT VFR BLD AUTO: 38.8 %
HCT VFR BLD AUTO: 40.1 %
HDLC SERPL-MCNC: 54 MG/DL (ref 40–59)
HGB BLD-MCNC: 12.5 G/DL
HGB BLD-MCNC: 12.7 G/DL
IMM GRANULOCYTES # BLD AUTO: 0.01 X10(3) UL (ref 0–1)
IMM GRANULOCYTES NFR BLD: 0.1 %
LDLC SERPL CALC-MCNC: 64 MG/DL (ref ?–100)
LYMPHOCYTES # BLD AUTO: 0.79 X10(3) UL (ref 1–4)
LYMPHOCYTES NFR BLD AUTO: 11.2 %
MCH RBC QN AUTO: 29.4 PG (ref 26–34)
MCH RBC QN AUTO: 29.8 PG (ref 26–34)
MCHC RBC AUTO-ENTMCNC: 31.7 G/DL (ref 31–37)
MCHC RBC AUTO-ENTMCNC: 32.2 G/DL (ref 31–37)
MCV RBC AUTO: 92.4 FL
MCV RBC AUTO: 92.8 FL
MONOCYTES # BLD AUTO: 0.6 X10(3) UL (ref 0.1–1)
MONOCYTES NFR BLD AUTO: 8.5 %
NEUTROPHILS # BLD AUTO: 5.42 X10 (3) UL (ref 1.5–7.7)
NEUTROPHILS # BLD AUTO: 5.42 X10(3) UL (ref 1.5–7.7)
NEUTROPHILS NFR BLD AUTO: 76.6 %
NONHDLC SERPL-MCNC: 79 MG/DL (ref ?–130)
NT-PROBNP SERPL-MCNC: 1070 PG/ML (ref ?–125)
OSMOLALITY SERPL CALC.SUM OF ELEC: 290 MOSM/KG (ref 275–295)
PLATELET # BLD AUTO: 203 10(3)UL (ref 150–450)
PLATELET # BLD AUTO: 217 10(3)UL (ref 150–450)
POTASSIUM SERPL-SCNC: 4.4 MMOL/L (ref 3.5–5.1)
RBC # BLD AUTO: 4.2 X10(6)UL
RBC # BLD AUTO: 4.32 X10(6)UL
SARS-COV-2 RNA RESP QL NAA+PROBE: NOT DETECTED
SODIUM SERPL-SCNC: 139 MMOL/L (ref 136–145)
T3FREE SERPL-MCNC: 2.35 PG/ML (ref 2.4–4.2)
T4 FREE SERPL-MCNC: 0.8 NG/DL (ref 0.8–1.7)
TRIGL SERPL-MCNC: 72 MG/DL (ref 30–149)
TROPONIN I SERPL-MCNC: <0.045 NG/ML (ref ?–0.04)
TROPONIN I SERPL-MCNC: <0.045 NG/ML (ref ?–0.04)
TSI SER-ACNC: 4.1 MIU/ML (ref 0.36–3.74)
VLDLC SERPL CALC-MCNC: 11 MG/DL (ref 0–30)
WBC # BLD AUTO: 7.1 X10(3) UL (ref 4–11)
WBC # BLD AUTO: 7.1 X10(3) UL (ref 4–11)

## 2021-08-04 PROCEDURE — 93306 TTE W/DOPPLER COMPLETE: CPT | Performed by: HOSPITALIST

## 2021-08-04 PROCEDURE — 71045 X-RAY EXAM CHEST 1 VIEW: CPT | Performed by: EMERGENCY MEDICINE

## 2021-08-04 PROCEDURE — 99223 1ST HOSP IP/OBS HIGH 75: CPT | Performed by: HOSPITALIST

## 2021-08-04 PROCEDURE — 5A2204Z RESTORATION OF CARDIAC RHYTHM, SINGLE: ICD-10-PCS | Performed by: INTERNAL MEDICINE

## 2021-08-04 RX ORDER — FUROSEMIDE 10 MG/ML
40 INJECTION INTRAMUSCULAR; INTRAVENOUS ONCE
Status: COMPLETED | OUTPATIENT
Start: 2021-08-04 | End: 2021-08-04

## 2021-08-04 RX ORDER — LEVOTHYROXINE SODIUM 0.1 MG/1
100 TABLET ORAL
Status: DISCONTINUED | OUTPATIENT
Start: 2021-08-04 | End: 2021-08-05

## 2021-08-04 RX ORDER — SPIRONOLACTONE 25 MG/1
25 TABLET ORAL
Status: DISCONTINUED | OUTPATIENT
Start: 2021-08-04 | End: 2021-08-05

## 2021-08-04 RX ORDER — METOPROLOL SUCCINATE 25 MG/1
25 TABLET, EXTENDED RELEASE ORAL DAILY
Status: DISCONTINUED | OUTPATIENT
Start: 2021-08-04 | End: 2021-08-04

## 2021-08-04 RX ORDER — METOPROLOL SUCCINATE 25 MG/1
25 TABLET, EXTENDED RELEASE ORAL DAILY
Status: ON HOLD | COMMUNITY
End: 2021-08-05

## 2021-08-04 RX ORDER — NITROGLYCERIN 0.4 MG/1
0.4 TABLET SUBLINGUAL EVERY 5 MIN PRN
Status: DISCONTINUED | OUTPATIENT
Start: 2021-08-04 | End: 2021-08-05

## 2021-08-04 RX ORDER — METOPROLOL TARTRATE 5 MG/5ML
2.5 INJECTION INTRAVENOUS ONCE
Status: COMPLETED | OUTPATIENT
Start: 2021-08-04 | End: 2021-08-04

## 2021-08-04 RX ORDER — MV-MN/FOLIC ACID/LUTEIN/HRB178 200-175MCG
1 TABLET ORAL DAILY
COMMUNITY

## 2021-08-04 RX ORDER — SODIUM CHLORIDE 9 MG/ML
INJECTION, SOLUTION INTRAVENOUS CONTINUOUS
Status: DISCONTINUED | OUTPATIENT
Start: 2021-08-04 | End: 2021-08-05

## 2021-08-04 RX ORDER — ATORVASTATIN CALCIUM 40 MG/1
40 TABLET, FILM COATED ORAL NIGHTLY
Status: DISCONTINUED | OUTPATIENT
Start: 2021-08-04 | End: 2021-08-05

## 2021-08-04 RX ORDER — LIDOCAINE HYDROCHLORIDE 10 MG/ML
INJECTION, SOLUTION EPIDURAL; INFILTRATION; INTRACAUDAL; PERINEURAL AS NEEDED
Status: DISCONTINUED | OUTPATIENT
Start: 2021-08-04 | End: 2021-08-04 | Stop reason: SURG

## 2021-08-04 RX ORDER — ZINC SULFATE 50(220)MG
50 CAPSULE ORAL DAILY
COMMUNITY

## 2021-08-04 RX ORDER — CARBAMAZEPINE 100 MG/1
400 TABLET, EXTENDED RELEASE ORAL 3 TIMES DAILY
Status: DISCONTINUED | OUTPATIENT
Start: 2021-08-04 | End: 2021-08-05

## 2021-08-04 RX ADMIN — SODIUM CHLORIDE: 9 INJECTION, SOLUTION INTRAVENOUS at 14:41:00

## 2021-08-04 RX ADMIN — LIDOCAINE HYDROCHLORIDE 50 MG: 10 INJECTION, SOLUTION EPIDURAL; INFILTRATION; INTRACAUDAL; PERINEURAL at 14:41:00

## 2021-08-04 RX ADMIN — SODIUM CHLORIDE: 9 INJECTION, SOLUTION INTRAVENOUS at 14:53:00

## 2021-08-04 NOTE — CONSULTS
[facility] NOTE      Patient Name: Andria Haley MRN: [id number]   : 1945 CSN: [id number] or syncope. He has been on Xarelto, with no missed doses for as long as he can remember (many months). He has no bleeding problems.    He has a h/o flutter ablation, then PAF controlled on sotalol, however that was decreased then stopped in 2019 due to si CATH ABLATION  03/2019    for a-flutter in [place]   • COLONOSCOPY     • COLONOSCOPY  11/18/2019   • COLONOSCOPY N/A 11/18/2019    Procedure: COLONOSCOPY;  Surgeon: Clyde Torres MD;  Location: [address] [place] [address] ENDOSCOPY   • HERNIA SURGERY     • OTHER SURGICAL HI H2O, carotids normal; irreg irreg, nl S1/S2, no murmur, gallop, or rub; no edema  Abd - soft, nontender  Ext - arthritic changes  Neuro - A+Ox3, no facial droop or gross deficits  Psych - cooperative, calm    LABS AND DATA:     ECG: AF, LBBB    TELE: AF, n

## 2021-08-04 NOTE — PROCEDURES
[facility]    Cardiac Electrophysiology Procedure Note    [facility]   [facility] [id number] MRN [id number]   Admission Date 8/4/2021 Procedure Date 8/4/2021   Attending Physician Opal Duffy,* Procedure Phys

## 2021-08-04 NOTE — PROGRESS NOTES
[facility]    Progress Note    Elie Dang Patient Status:  Inpatient    1945 MRN [id number]   Location [facility] 3W/SW Attending Lori Duffy Day # 0 PCP Lena Ratliff MD     HPI/Subjective: 12/20/2017    MG 2.0 08/04/2021    TROP <0.045 08/04/2021       XR CHEST AP PORTABLE  (CPT=71045)    Result Date: 8/4/2021  CONCLUSION:   Moderate cardiomegaly with central pulmonary vascular congestion, with mild vascular prominence, possible early inters thyroid supplementation. Check TSH. Certainly, if he is hypothyroid, that could also be contributing to his arrhythmia.s/p surgery  9. Trigeminal neuralgia. Continue home medications.   10.     Dysphagia will check swallow study; food occ stuck in

## 2021-08-04 NOTE — ANESTHESIA PREPROCEDURE EVALUATION
Anesthesia PreOp Note    HPI:     Tavon Martin is a 77year old male who presents for preoperative consultation requested by: * No surgeons listed *    Date of Surgery: 8/4/2021    * No procedures listed *  Indication: * No pre-op diagnosis entered *    R inguinal, bilateral    • Herniated disc    • Herniated disc    • High blood pressure    • High cholesterol    • Hyperlipidemia    • Inguinal hernia recurrent unilateral 7/4/2013   • Pulmonary hypertension (HCC)    • Pulmonary hypertension (HCC)    • S/P ab MG Oral Tab, 1 Tablet orally 1.5--2 hours before planned sexual activity, Disp: 30 tablet, Rfl: 3, Past Week at Unknown time  atorvastatin 40 MG Oral Tab, Take 80 mg by mouth nightly.  , Disp: , Rfl: 2, 8/3/2021 at Unknown time  spironolactone 25 MG Oral T of Onset   • Prostate Cancer Father    • Cancer Father    • Stroke Mother         Per NG: TIA's   • Other (Other) Mother         vascular dementia   • Kidney Disease Maternal Grandfather         Per NG kidney removal   • Skin cancer Maternal Grandfather Minutes of Exercise per Session:   Stress:       Feeling of Stress :   Social Connections:       Frequency of Communication with Friends and Family:       Frequency of Social Gatherings with Friends and Family:       Attends [employer] Services:       Activ Plan:   ASA:  3  Plan:   MAC  Informed Consent Plan and Risks Discussed With:  Patient  Discussed plan with:  Surgeon      I have informed Crejuliette Sudha and/or legal guardian or family member of the nature of the anesthetic plan, benefits, risks including

## 2021-08-04 NOTE — ED INITIAL ASSESSMENT (HPI)
C/o heart palpitations since June. Known cardiac hx of a.fib/flutter along with stent placement x2 and valve replacement.

## 2021-08-04 NOTE — ANESTHESIA POSTPROCEDURE EVALUATION
Patient: Maame Olson    Procedure Summary     Date: 08/04/21 Room / Location: [place] [address].    Anesthesia Start: 8922 Anesthesia Stop: 8599    Procedure: EP CARDIOVERSION 1X Diagnosis: (AFIB)    Scheduled Providers: Omaira Robles

## 2021-08-04 NOTE — H&P
[facility]    PATIENT'S NAME: ESTHER GIL   ATTENDING PHYSICIAN: Traci Duffy MD   PATIENT ACCOUNT#:   [id number]    LOCATION:  [address] [place] [address] [address] #:   [id number]       YOB: 1945  ADMISSION DATE:       08/04/20 well.  His initial troponin was negative at less than 0.045. His proBNP was 1070. Glucose was 126, sodium 139, potassium 4.4, chloride 107, CO2 of 27, BUN of 14 with a creatinine of 0.8, calcium 9.3, anion gap of 5.   White count 7.1 with a hemoglobin of admission, multivitamin once a day, metoprolol 25 mg daily, zinc 50 mg daily, stool softeners once daily, diazepam 10 mg on an as needed basis prior to procedure, glucosamine/chondroitin sulfate, vitamin D 1200 mg daily, Xarelto 20 mg daily, levothyroxine present. LUNGS:  Scattered crackles bilaterally. HEART:  Irregularly irregular rate and rhythm, tachycardic, normal S1, S2, and a systolic murmur. ABDOMEN:  Soft and nontender, with normoactive bowel sounds. No guarding. No rebound.   EXTREMITIES:  No of it. He is noncompliant with CPAP. Discussed the role of sleep apnea in arrhythmias with the patient. 6.   Hypertension.   We will increase his dose of beta blocker which was ordered by the nurse practitioner 1 week ago as his heart rate has remained e

## 2021-08-04 NOTE — ED QUICK NOTES
Orders for admission, patient is aware of plan and ready to go upstairs. Any questions please call ED LORENA Lizarraga at extension [phone].      Type of COVID test sent: rapid covid  COVID Suspicion level: LOW    Titratable drugs infusing: none  Rate:    LOC at sachi

## 2021-08-04 NOTE — IVS NOTE
Procedure hand off report given to Mark Mayen. Pt's vital signs are stable. Defib pad site just slight redness. Dr. Asuncion Ulloa spoke with patient/family post procedure.

## 2021-08-04 NOTE — ED PROVIDER NOTES
Patient Seen in: [facility] AND [facility] Emergency Department      History   Patient presents with:  Palpitations    Stated Complaint: palpitations     HPI/Subjective:   HPI    79-year-old male with history of coronary disease status post open heart surgery re TONSILLECTOMY                  Social History    Tobacco Use      Smoking status: Former Smoker        Packs/day: 0.25        Years: 2.00        Pack years: .5        Types: Cigarettes        Quit date: 10/5/1969        Years since quittin.8      Smok following components:       Result Value    Glucose 126 (*)     All other components within normal limits   PRO BETA NATRIURETIC PEPTIDE - Abnormal; Notable for the following components:    Pro-Beta Natriuretic Peptide 1,070 (*)     All other components wi with intermittent short nonsustained runs of VT, doubt Andra phenomenon or episodic aberrancy. Nayeli Higginsck His blood pressure stable. He was given 1 dose of IV Lopressor with improvement in his symptoms. He is hemodynamically stable without chest pain.   I consult

## 2021-08-05 ENCOUNTER — APPOINTMENT (OUTPATIENT)
Dept: CARDIOLOGY | Age: 76
End: 2021-08-05

## 2021-08-05 VITALS
OXYGEN SATURATION: 95 % | BODY MASS INDEX: 27 KG/M2 | DIASTOLIC BLOOD PRESSURE: 81 MMHG | TEMPERATURE: 98 F | RESPIRATION RATE: 16 BRPM | SYSTOLIC BLOOD PRESSURE: 111 MMHG | HEART RATE: 58 BPM | WEIGHT: 173.81 LBS

## 2021-08-05 LAB
BASOPHILS # BLD AUTO: 0.03 X10(3) UL (ref 0–0.2)
BASOPHILS NFR BLD AUTO: 0.5 %
DEPRECATED RDW RBC AUTO: 45.4 FL (ref 35.1–46.3)
EOSINOPHIL # BLD AUTO: 0.15 X10(3) UL (ref 0–0.7)
EOSINOPHIL NFR BLD AUTO: 2.7 %
ERYTHROCYTE [DISTWIDTH] IN BLOOD BY AUTOMATED COUNT: 13.5 % (ref 11–15)
HAV IGM SER QL: 2 MG/DL (ref 1.6–2.6)
HCT VFR BLD AUTO: 35.9 %
HGB BLD-MCNC: 11.6 G/DL
IMM GRANULOCYTES # BLD AUTO: 0.02 X10(3) UL (ref 0–1)
IMM GRANULOCYTES NFR BLD: 0.4 %
LYMPHOCYTES # BLD AUTO: 0.7 X10(3) UL (ref 1–4)
LYMPHOCYTES NFR BLD AUTO: 12.4 %
MCH RBC QN AUTO: 29.4 PG (ref 26–34)
MCHC RBC AUTO-ENTMCNC: 32.3 G/DL (ref 31–37)
MCV RBC AUTO: 90.9 FL
MONOCYTES # BLD AUTO: 0.64 X10(3) UL (ref 0.1–1)
MONOCYTES NFR BLD AUTO: 11.4 %
NEUTROPHILS # BLD AUTO: 4.09 X10 (3) UL (ref 1.5–7.7)
NEUTROPHILS # BLD AUTO: 4.09 X10(3) UL (ref 1.5–7.7)
NEUTROPHILS NFR BLD AUTO: 72.6 %
PLATELET # BLD AUTO: 182 10(3)UL (ref 150–450)
RBC # BLD AUTO: 3.95 X10(6)UL
WBC # BLD AUTO: 5.6 X10(3) UL (ref 4–11)

## 2021-08-05 PROCEDURE — 99239 HOSP IP/OBS DSCHRG MGMT >30: CPT | Performed by: HOSPITALIST

## 2021-08-05 NOTE — DISCHARGE SUMMARY
Dc summary#[id number]  > 30 min spent on [address] Discharge Diagnoses: rr afib    Lace+ Score: 43  59-90 High Risk  29-58 Medium Risk  0-28   Low Risk. TCM Follow-Up Recommendation:  LACE > 58:  High Risk of readmission after discharge from the hospi

## 2021-08-05 NOTE — PROGRESS NOTES
Natalya Nicole 89                                                            PROGRESS NOTE      Patient Name: Adenike Roland MRN: [id number]   : 1945 CSN: [id number] SOB, lightheadedness overnight.      Medications:     • rivaroxaban  20 mg Oral Daily with food   • atorvastatin  40 mg Oral Nightly   • carBAMazepine ER  400 mg Oral TID   • Levothyroxine Sodium  100 mcg Oral Before breakfast   • spironolactone  25 mg Oral 8/04/2021 at 6:21 AM              [facility] Melita, 58-60 overnight. Down to high 40s with sleeping. No significant pauses.      ------------------------------------------------------------------------------------------------------------------------------

## 2021-08-05 NOTE — CM/SW NOTE
BPCI-Advanced Medicare Program Note:  Plan of care reviewed for care coordination and discharge planning. Noted patient falls under  BPCI/Medicare program, with  for cardiac arrhthymia. LELAND tool was used to help determine next care setting.  Thus,

## 2021-08-05 NOTE — PLAN OF CARE
Patient alert and oriented, reported dyspnea and palpitations this morning. After cardioversion this afternoon reports feeling much better with no shortness of breath or palpitations.  Remains in NSR-SB. 1 dose of IV lasix given, 2D echo done pending result report SOB or any respiratory difficulty  - Respiratory Therapy support as indicated  - Manage/alleviate anxiety  - Monitor for signs/symptoms of CO2 retention  Outcome: Progressing

## 2021-08-05 NOTE — DISCHARGE PLANNING
Patient was provided with discharge instructions, education, and follow up information; patient's wife Sakshi  was also present for instructions with patient's consent.  Patient verbalizes understanding of follow up information, specifically next medication d

## 2021-08-05 NOTE — PLAN OF CARE
Patient resting well overnight. Dre Yousif for discharge. No chest pain, palpitations or shortness of breath. Plan for home today. Will continue to monitor.        Problem: Patient Centered Care  Goal: Patient preferences are identified and integrated in the pat INTERVENTIONS:  - Continuous cardiac monitoring, monitor vital signs, obtain 12 lead EKG if indicated  - Evaluate effectiveness of antiarrhythmic and heart rate control medications as ordered  - Initiate emergency measures for life threatening arrhythmias

## 2021-08-06 ENCOUNTER — PATIENT OUTREACH (OUTPATIENT)
Dept: CASE MANAGEMENT | Age: 76
End: 2021-08-06

## 2021-08-06 DIAGNOSIS — I25.10 CORONARY ARTERY DISEASE INVOLVING NATIVE CORONARY ARTERY OF NATIVE HEART WITHOUT ANGINA PECTORIS: ICD-10-CM

## 2021-08-06 DIAGNOSIS — G50.0 TRIGEMINAL NEURALGIA: ICD-10-CM

## 2021-08-06 DIAGNOSIS — I48.91 ATRIAL FIBRILLATION WITH RAPID VENTRICULAR RESPONSE (HCC): Primary | ICD-10-CM

## 2021-08-06 DIAGNOSIS — I10 ESSENTIAL HYPERTENSION, BENIGN: ICD-10-CM

## 2021-08-06 NOTE — DISCHARGE SUMMARY
[facility]    PATIENT'S NAME: ESTHER GIL   ATTENDING PHYSICIAN: Traci Duffy MD   PATIENT ACCOUNT#:   [id number]    LOCATION:  [address] [place] [address] #:   [id number]       YOB: 1945  ADMISSION DATE:       08/04/20 heart valve appears to be doing well on the echocardiogram.  4.   History of smoking only during [address] 5.   Hyperglycemia, mild. 6.   Obstructive sleep apnea. Would recommend compliance with CPAP. 7.   Hypertension.   We will hold off on beta blo

## 2021-08-06 NOTE — PROGRESS NOTES
Attempted to reach the patient to complete [facility] FU call. Left a message for the pt to call [facility] back at, [phone]. The number for the [facility] was also given to the patient to schedule at, [phone].

## 2021-08-08 ENCOUNTER — APPOINTMENT (OUTPATIENT)
Dept: GENERAL RADIOLOGY | Facility: HOSPITAL | Age: 76
End: 2021-08-08
Attending: EMERGENCY MEDICINE
Payer: MEDICARE

## 2021-08-08 ENCOUNTER — HOSPITAL ENCOUNTER (OUTPATIENT)
Facility: HOSPITAL | Age: 76
Setting detail: OBSERVATION
LOS: 1 days | Discharge: HOME | End: 2021-08-09
Attending: EMERGENCY MEDICINE | Admitting: HOSPITALIST
Payer: MEDICARE

## 2021-08-08 DIAGNOSIS — I48.91 ATRIAL FIBRILLATION WITH RVR (HCC): Primary | ICD-10-CM

## 2021-08-08 LAB
ANION GAP SERPL CALC-SCNC: 9 MMOL/L (ref 0–18)
BASOPHILS # BLD AUTO: 0.03 X10(3) UL (ref 0–0.2)
BASOPHILS NFR BLD AUTO: 0.6 %
BILIRUB UR QL: NEGATIVE
BUN BLD-MCNC: 12 MG/DL (ref 7–18)
BUN/CREAT SERPL: 14.3 (ref 10–20)
CALCIUM BLD-MCNC: 9.5 MG/DL (ref 8.5–10.1)
CHLORIDE SERPL-SCNC: 102 MMOL/L (ref 98–112)
CK SERPL-CCNC: 64 U/L
CLARITY UR: CLEAR
CO2 SERPL-SCNC: 25 MMOL/L (ref 21–32)
COLOR UR: YELLOW
CREAT BLD-MCNC: 0.84 MG/DL
D DIMER PPP FEU-MCNC: 0.35 UG/ML FEU (ref ?–0.74)
DEPRECATED RDW RBC AUTO: 45 FL (ref 35.1–46.3)
EOSINOPHIL # BLD AUTO: 0.02 X10(3) UL (ref 0–0.7)
EOSINOPHIL NFR BLD AUTO: 0.4 %
ERYTHROCYTE [DISTWIDTH] IN BLOOD BY AUTOMATED COUNT: 13.6 % (ref 11–15)
GLUCOSE BLD-MCNC: 104 MG/DL (ref 70–99)
GLUCOSE UR-MCNC: NEGATIVE MG/DL
HAV IGM SER QL: 2.3 MG/DL (ref 1.6–2.6)
HCT VFR BLD AUTO: 42.6 %
HGB BLD-MCNC: 13.9 G/DL
HGB UR QL STRIP.AUTO: NEGATIVE
IMM GRANULOCYTES # BLD AUTO: 0.01 X10(3) UL (ref 0–1)
IMM GRANULOCYTES NFR BLD: 0.2 %
LEUKOCYTE ESTERASE UR QL STRIP.AUTO: NEGATIVE
LYMPHOCYTES # BLD AUTO: 0.55 X10(3) UL (ref 1–4)
LYMPHOCYTES NFR BLD AUTO: 11.1 %
MCH RBC QN AUTO: 29.3 PG (ref 26–34)
MCHC RBC AUTO-ENTMCNC: 32.6 G/DL (ref 31–37)
MCV RBC AUTO: 89.7 FL
MONOCYTES # BLD AUTO: 0.9 X10(3) UL (ref 0.1–1)
MONOCYTES NFR BLD AUTO: 18.1 %
NEUTROPHILS # BLD AUTO: 3.46 X10 (3) UL (ref 1.5–7.7)
NEUTROPHILS # BLD AUTO: 3.46 X10(3) UL (ref 1.5–7.7)
NEUTROPHILS NFR BLD AUTO: 69.6 %
NITRITE UR QL STRIP.AUTO: NEGATIVE
NT-PROBNP SERPL-MCNC: 1666 PG/ML (ref ?–125)
OSMOLALITY SERPL CALC.SUM OF ELEC: 282 MOSM/KG (ref 275–295)
PH UR: 6 [PH] (ref 5–8)
PLATELET # BLD AUTO: 193 10(3)UL (ref 150–450)
POTASSIUM SERPL-SCNC: 4.2 MMOL/L (ref 3.5–5.1)
PROCALCITONIN SERPL-MCNC: 0.04 NG/ML (ref ?–0.16)
PROT UR-MCNC: NEGATIVE MG/DL
RBC # BLD AUTO: 4.75 X10(6)UL
SARS-COV-2 RNA RESP QL NAA+PROBE: DETECTED
SODIUM SERPL-SCNC: 136 MMOL/L (ref 136–145)
SP GR UR STRIP: 1.01 (ref 1–1.03)
TROPONIN I SERPL-MCNC: <0.045 NG/ML (ref ?–0.04)
TSI SER-ACNC: 3.44 MIU/ML (ref 0.36–3.74)
UROBILINOGEN UR STRIP-ACNC: <2
WBC # BLD AUTO: 5 X10(3) UL (ref 4–11)

## 2021-08-08 PROCEDURE — 71045 X-RAY EXAM CHEST 1 VIEW: CPT | Performed by: EMERGENCY MEDICINE

## 2021-08-08 PROCEDURE — 99220 INITIAL OBSERVATION CARE,LEVL III: CPT | Performed by: HOSPITALIST

## 2021-08-08 RX ORDER — BISACODYL 10 MG
10 SUPPOSITORY, RECTAL RECTAL
Status: DISCONTINUED | OUTPATIENT
Start: 2021-08-08 | End: 2021-08-09

## 2021-08-08 RX ORDER — METOCLOPRAMIDE HYDROCHLORIDE 5 MG/ML
10 INJECTION INTRAMUSCULAR; INTRAVENOUS EVERY 8 HOURS PRN
Status: DISCONTINUED | OUTPATIENT
Start: 2021-08-08 | End: 2021-08-09

## 2021-08-08 RX ORDER — ACETAMINOPHEN 325 MG/1
650 TABLET ORAL EVERY 4 HOURS PRN
Status: DISCONTINUED | OUTPATIENT
Start: 2021-08-08 | End: 2021-08-09

## 2021-08-08 RX ORDER — ALBUTEROL SULFATE 90 UG/1
4 AEROSOL, METERED RESPIRATORY (INHALATION) EVERY 4 HOURS PRN
Status: DISCONTINUED | OUTPATIENT
Start: 2021-08-08 | End: 2021-08-09

## 2021-08-08 RX ORDER — ENOXAPARIN SODIUM 100 MG/ML
40 INJECTION SUBCUTANEOUS EVERY EVENING
Status: DISCONTINUED | OUTPATIENT
Start: 2021-08-08 | End: 2021-08-08

## 2021-08-08 RX ORDER — DILTIAZEM HYDROCHLORIDE 5 MG/ML
INJECTION INTRAVENOUS
Status: COMPLETED
Start: 2021-08-08 | End: 2021-08-08

## 2021-08-08 RX ORDER — GUAIFENESIN 600 MG
600 TABLET, EXTENDED RELEASE 12 HR ORAL 2 TIMES DAILY
Status: DISCONTINUED | OUTPATIENT
Start: 2021-08-08 | End: 2021-08-09

## 2021-08-08 RX ORDER — SODIUM PHOSPHATE, DIBASIC AND SODIUM PHOSPHATE, MONOBASIC 7; 19 G/133ML; G/133ML
1 ENEMA RECTAL ONCE AS NEEDED
Status: DISCONTINUED | OUTPATIENT
Start: 2021-08-08 | End: 2021-08-09

## 2021-08-08 RX ORDER — HYDROCODONE BITARTRATE AND ACETAMINOPHEN 5; 325 MG/1; MG/1
1 TABLET ORAL EVERY 4 HOURS PRN
Status: DISCONTINUED | OUTPATIENT
Start: 2021-08-08 | End: 2021-08-09

## 2021-08-08 RX ORDER — HYDROCODONE BITARTRATE AND ACETAMINOPHEN 5; 325 MG/1; MG/1
2 TABLET ORAL EVERY 4 HOURS PRN
Status: DISCONTINUED | OUTPATIENT
Start: 2021-08-08 | End: 2021-08-09

## 2021-08-08 RX ORDER — ATORVASTATIN CALCIUM 80 MG/1
80 TABLET, FILM COATED ORAL NIGHTLY
Status: DISCONTINUED | OUTPATIENT
Start: 2021-08-08 | End: 2021-08-09

## 2021-08-08 RX ORDER — ASPIRIN 81 MG/1
81 TABLET, CHEWABLE ORAL DAILY
Status: DISCONTINUED | OUTPATIENT
Start: 2021-08-08 | End: 2021-08-09

## 2021-08-08 RX ORDER — POLYETHYLENE GLYCOL 3350 17 G/17G
17 POWDER, FOR SOLUTION ORAL DAILY PRN
Status: DISCONTINUED | OUTPATIENT
Start: 2021-08-08 | End: 2021-08-09

## 2021-08-08 RX ORDER — ONDANSETRON 2 MG/ML
4 INJECTION INTRAMUSCULAR; INTRAVENOUS EVERY 6 HOURS PRN
Status: DISCONTINUED | OUTPATIENT
Start: 2021-08-08 | End: 2021-08-09

## 2021-08-08 RX ORDER — CARBAMAZEPINE 400 MG/1
400 TABLET, EXTENDED RELEASE ORAL 3 TIMES DAILY
Status: DISCONTINUED | OUTPATIENT
Start: 2021-08-08 | End: 2021-08-09

## 2021-08-08 RX ORDER — ASPIRIN 81 MG/1
81 TABLET, CHEWABLE ORAL DAILY
Status: DISCONTINUED | OUTPATIENT
Start: 2021-08-09 | End: 2021-08-08

## 2021-08-08 RX ORDER — ASPIRIN 81 MG/1
81 TABLET, CHEWABLE ORAL DAILY
Status: DISCONTINUED | OUTPATIENT
Start: 2021-08-08 | End: 2021-08-08

## 2021-08-08 RX ORDER — ASCORBIC ACID 500 MG
1000 TABLET ORAL DAILY
Status: DISCONTINUED | OUTPATIENT
Start: 2021-08-08 | End: 2021-08-09

## 2021-08-08 RX ORDER — SPIRONOLACTONE 25 MG/1
25 TABLET ORAL
Status: DISCONTINUED | OUTPATIENT
Start: 2021-08-09 | End: 2021-08-09

## 2021-08-08 RX ORDER — LEVOTHYROXINE SODIUM 0.1 MG/1
100 TABLET ORAL DAILY
Status: DISCONTINUED | OUTPATIENT
Start: 2021-08-09 | End: 2021-08-09

## 2021-08-08 RX ORDER — DILTIAZEM HYDROCHLORIDE 5 MG/ML
10 INJECTION INTRAVENOUS ONCE
Status: COMPLETED | OUTPATIENT
Start: 2021-08-08 | End: 2021-08-08

## 2021-08-08 RX ORDER — ZINC SULFATE 50(220)MG
220 CAPSULE ORAL 2 TIMES DAILY
Status: DISCONTINUED | OUTPATIENT
Start: 2021-08-08 | End: 2021-08-09

## 2021-08-08 RX ORDER — ACETAMINOPHEN 325 MG/1
650 TABLET ORAL EVERY 6 HOURS PRN
Status: DISCONTINUED | OUTPATIENT
Start: 2021-08-08 | End: 2021-08-09

## 2021-08-08 RX ORDER — DOCUSATE SODIUM 100 MG/1
100 CAPSULE, LIQUID FILLED ORAL 2 TIMES DAILY
Status: DISCONTINUED | OUTPATIENT
Start: 2021-08-08 | End: 2021-08-09

## 2021-08-08 NOTE — ED INITIAL ASSESSMENT (HPI)
Marilyn Morrison arrives with complaints of palpitations since just PTA .  Hx of cardioversion last week
09-Jun-2020 12:29

## 2021-08-08 NOTE — CONSULTS
[facility] HOSP - [facility]  Cardiology Consultation    Juanjuliette Sudha Patient Status:  Inpatient    1945 MRN [id number]   Location [address] [facility] Attending Summer Maravilla MD   Hosp Day # 0 PCP John Multani MD     Date of Admission: disc    • High blood pressure    • High cholesterol    • Hyperlipidemia    • Inguinal hernia recurrent unilateral 7/4/2013   • Pulmonary hypertension (HCC)    • Pulmonary hypertension (HCC)    • S/P ablation of atrial flutter 6/12/2019   • Sleep apnea    • MULTIVITAMIN FOR MEN) Oral Tab, Take 1 tablet by mouth daily. zinc sulfate 220 (50 Zn) MG Oral Cap, Take 50 mg by mouth daily.   XARELTO 20 MG Oral Tab, TAKE 1 TABLET(20 MG) BY MOUTH DAILY (Patient taking differently: Take 20 mg by mouth nightly.  )  LEVOT 08/06/21 0700 - 08/07/21 0659 (Not Admitted) 08/07/21 0700 - 08/08/21 0659 (Not Admitted) 08/08/21 0700 - 08/09/21 3177       Intake    I.V.  --  --  5    Volume (mL) Diltiazem -- -- 5    Total Intake -- -- 5       Output    Urine  --  --  --    Kar Mejia 08/08/21  0932   RBC 4.20   < > 4.32 3.95 4.75   HGB 12.5*   < > 12.7* 11.6* 13.9   HCT 38.8*   < > 40.1 35.9* 42.6   MCV 92.4   < > 92.8 90.9 89.7   MCH 29.8   < > 29.4 29.4 29.3   MCHC 32.2   < > 31.7 32.3 32.6   RDW 13.7   < > 13.7 13.5 13.6   NEPRELIM inferior wall at rest, worstens with exercise suspicious for stress      induced ischemia        Baseline LVH   2. Procedure narrative: Treadmill exercise testing was performed using the      ramped Christian protocol.  The patient exercised for 10 min, to prot

## 2021-08-08 NOTE — ED PROVIDER NOTES
Patient Seen in: [facility] AND [facility] Emergency Department      History   Patient presents with:  Arrythmia/Palpitations    Stated Complaint: rapid heart rate    HPI/Subjective:   HPI  Patient is a 81-year-old male history of A. Fib/Aflutter status post uns a-flutter in [place]   • COLONOSCOPY     • COLONOSCOPY  11/18/2019   • COLONOSCOPY N/A 11/18/2019    Procedure: COLONOSCOPY;  Surgeon: Alva Shukla MD;  Location: [address] [place] [address] ENDOSCOPY   • EP CARDIOVERSION 1X  8/4/2021        • HERNIA SURGERY     • OTHER BARTHOLOMEW kg   SpO2 94%   BMI 30.23 kg/m²         Physical Exam  Vitals and nursing note reviewed. Constitutional:       General: He is not in acute distress. Appearance: Normal appearance. He is not toxic-appearing. HENT:      Head: Normocephalic.       Mout ---------                               -----------         ------                     CBC W/ DIFFERENTIAL[[id number]]          Abnormal            Final result                 Please view results for these tests on the individual orders.    RAINBOW DRAW L with consultants but not including time spent performing procedures.                            Disposition and Plan     Clinical Impression:  Atrial fibrillation with RVR ([address]  (primary encounter diagnosis)     Disposition:  Admit  8/8/2021 10:42 am    Fo

## 2021-08-08 NOTE — ED QUICK NOTES
Dr Maverick Hwang at bedside  Pt alert and verbal  Pads applied to patients chest and patient placed on cardiac monitor

## 2021-08-08 NOTE — ED QUICK NOTES
Orders for admission, patient is aware of plan and ready to go upstairs. Any questions, please call ED LORENA kendall at extension [phone].    Type of COVID test sent:rapid - vaccinated  COVID Suspicion level: HIGH - positive    Titratable drug(s) infusing:cardiZE

## 2021-08-08 NOTE — H&P
[address] [facility] [place] Patient Status:  Inpatient    1945 MRN [id number]   Location [address] [facility] Attending Miriam Olmedo MD   Hosp Day # 0 PCP Kelvin Rodriguez MD     Date:  2021  Date of neuralgia) 1984    Per NG: Anti-seizure medication   • TN (trigeminal neuralgia)      Past Surgical History:   Procedure Laterality Date   • ANGIOPLASTY (CORONARY)     • CABG      CABG and aortic valve replacement 2014   • CARDIOVERSION     • CATH ABLATION COMPLEX) Oral Cap 8/8/2021 at am  Yes Yes   Sig: Take 1 tablet by mouth daily.  1200 mg daily    LEVOTHYROXINE SODIUM 100 MCG Oral Tab 8/8/2021 at am  No Yes   Sig: TAKE 1 TABLET(100 MCG) BY MOUTH DAILY   Multiple Vitamins-Minerals (MERLIN MULTIVITAMIN FOR ME bruit, no jugular venous distention, no lymphadenopathy, no thyromegaly. Respiratory:  Lungs are clear to auscultation, respirations are non-labored, breath sounds are equal, symmetrical chest wall expansion.   Cardiovascular:  Irregularly irregular   Jose Juan ECHO from 2020 equivocal exam consider rpt testing outpt   - cardiology following     CAD s/p CABG  - cardiology to see  - cont home cardiac meds.      Other medical problems   Bovine Valve replacement   CHE  HTN   Hyperlipidemia  Hypothyroidism   Trigemina

## 2021-08-08 NOTE — PLAN OF CARE
Pt received from ED, covid positive, on room air, no complaints. Afib rvr in the ED, now transitioned to po metoprolol.     Problem: Patient Centered Care  Goal: Patient preferences are identified and integrated in the patient's plan of care  Description: I cardiac arrhythmias or at baseline  Description: INTERVENTIONS:  - Continuous cardiac monitoring, monitor vital signs, obtain 12 lead EKG if indicated  - Evaluate effectiveness of antiarrhythmic and heart rate control medications as ordered  - Initiate javed

## 2021-08-09 VITALS
RESPIRATION RATE: 20 BRPM | HEIGHT: 67 IN | BODY MASS INDEX: 30.78 KG/M2 | WEIGHT: 196.13 LBS | OXYGEN SATURATION: 93 % | HEART RATE: 121 BPM | TEMPERATURE: 98 F | DIASTOLIC BLOOD PRESSURE: 88 MMHG | SYSTOLIC BLOOD PRESSURE: 102 MMHG

## 2021-08-09 LAB
ALBUMIN SERPL-MCNC: 3.5 G/DL (ref 3.4–5)
ALBUMIN/GLOB SERPL: 0.9 {RATIO} (ref 1–2)
ALP LIVER SERPL-CCNC: 115 U/L
ALT SERPL-CCNC: 63 U/L
ANION GAP SERPL CALC-SCNC: 9 MMOL/L (ref 0–18)
AST SERPL-CCNC: 45 U/L (ref 15–37)
BASOPHILS # BLD AUTO: 0.03 X10(3) UL (ref 0–0.2)
BASOPHILS NFR BLD AUTO: 0.6 %
BILIRUB SERPL-MCNC: 0.4 MG/DL (ref 0.1–2)
BUN BLD-MCNC: 12 MG/DL (ref 7–18)
BUN/CREAT SERPL: 14.3 (ref 10–20)
CALCIUM BLD-MCNC: 9.8 MG/DL (ref 8.5–10.1)
CHLORIDE SERPL-SCNC: 101 MMOL/L (ref 98–112)
CO2 SERPL-SCNC: 26 MMOL/L (ref 21–32)
CREAT BLD-MCNC: 0.84 MG/DL
CRP SERPL-MCNC: 7.82 MG/DL (ref ?–0.3)
D DIMER PPP FEU-MCNC: 0.36 UG/ML FEU (ref ?–0.74)
DEPRECATED HBV CORE AB SER IA-ACNC: 54.8 NG/ML
DEPRECATED RDW RBC AUTO: 43.8 FL (ref 35.1–46.3)
EOSINOPHIL # BLD AUTO: 0.1 X10(3) UL (ref 0–0.7)
EOSINOPHIL NFR BLD AUTO: 2.1 %
ERYTHROCYTE [DISTWIDTH] IN BLOOD BY AUTOMATED COUNT: 13.3 % (ref 11–15)
GLOBULIN PLAS-MCNC: 3.9 G/DL (ref 2.8–4.4)
GLUCOSE BLD-MCNC: 138 MG/DL (ref 70–99)
HAV IGM SER QL: 2.3 MG/DL (ref 1.6–2.6)
HCT VFR BLD AUTO: 43 %
HGB BLD-MCNC: 14.2 G/DL
IMM GRANULOCYTES # BLD AUTO: 0.01 X10(3) UL (ref 0–1)
IMM GRANULOCYTES NFR BLD: 0.2 %
LDH SERPL L TO P-CCNC: 183 U/L
LYMPHOCYTES # BLD AUTO: 0.64 X10(3) UL (ref 1–4)
LYMPHOCYTES NFR BLD AUTO: 13.4 %
M PROTEIN MFR SERPL ELPH: 7.4 G/DL (ref 6.4–8.2)
MCH RBC QN AUTO: 29.4 PG (ref 26–34)
MCHC RBC AUTO-ENTMCNC: 33 G/DL (ref 31–37)
MCV RBC AUTO: 89 FL
MONOCYTES # BLD AUTO: 0.5 X10(3) UL (ref 0.1–1)
MONOCYTES NFR BLD AUTO: 10.5 %
NEUTROPHILS # BLD AUTO: 3.5 X10 (3) UL (ref 1.5–7.7)
NEUTROPHILS # BLD AUTO: 3.5 X10(3) UL (ref 1.5–7.7)
NEUTROPHILS NFR BLD AUTO: 73.2 %
OSMOLALITY SERPL CALC.SUM OF ELEC: 284 MOSM/KG (ref 275–295)
PLATELET # BLD AUTO: 203 10(3)UL (ref 150–450)
POTASSIUM SERPL-SCNC: 4.2 MMOL/L (ref 3.5–5.1)
RBC # BLD AUTO: 4.83 X10(6)UL
SODIUM SERPL-SCNC: 136 MMOL/L (ref 136–145)
WBC # BLD AUTO: 4.8 X10(3) UL (ref 4–11)

## 2021-08-09 PROCEDURE — 99217 OBSERVATION CARE DISCHARGE: CPT | Performed by: HOSPITALIST

## 2021-08-09 RX ORDER — METOPROLOL TARTRATE 50 MG/1
50 TABLET, FILM COATED ORAL
Qty: 60 TABLET | Refills: 5 | Status: SHIPPED | OUTPATIENT
Start: 2021-08-09 | End: 2021-08-17

## 2021-08-09 RX ORDER — METOPROLOL TARTRATE 50 MG/1
50 TABLET, FILM COATED ORAL
Status: DISCONTINUED | OUTPATIENT
Start: 2021-08-09 | End: 2021-08-09

## 2021-08-09 RX ORDER — CHOLECALCIFEROL (VITAMIN D3) 25 MCG
2000 TABLET ORAL DAILY
Qty: 30 TABLET | Refills: 0 | Status: SHIPPED | OUTPATIENT
Start: 2021-08-10

## 2021-08-09 NOTE — CM/SW NOTE
MDO to SW for discharge planning. Per chart review and RN pt is independent w no needs anticipated. Pt to discharge today. SW/CM to remain available for support and/or discharge planning.      CASS Garcia, [facility] Work   YTJ:#[id number]

## 2021-08-09 NOTE — PROGRESS NOTES
[facility] HOSP - [facility]    Progress Note    Candidaalba Russellwhitley Patient Status:  Inpatient    1945 MRN [id number]   Location [facility] Attending Adele Bueno MD   Hosp Day # 1 PCP Lakesha Zaragoza MD     HPI/Subjective:   Ger Zhou 08/09/2021    ALKPHO 115 08/09/2021    BILT 0.4 08/09/2021    TP 7.4 08/09/2021    AST 45 (H) 08/09/2021    ALT 63 (H) 08/09/2021    INR 1.61 (H) 06/14/2019    T4F 0.8 08/04/2021    TSH 3.440 08/08/2021    PSA 1.9 12/20/2017    DDIMER 0.36 08/09/2021    CR    Other medical problems   Bovine Valve replacement   CHE  HTN   Hyperlipidemia  Hypothyroidism   Trigeminal neuralgia         Prophylaxis  xarelto     Greater than 35 min spent with >50% time spent counseling patient re: treatment and plan    **Certifi

## 2021-08-09 NOTE — CM/SW NOTE
Patient failed inpatient criteria. Second level of review completed by Dyllan Mukherjee and supports observation. UR committee in agreement. Discussed with Dr. Fred Harrison  who approves observation status. MOON  notice explained and  provided  to the patient .  C

## 2021-08-09 NOTE — PROGRESS NOTES
[address] [facility] Patient Status:  Inpatient    1945 MRN [id number]   Location [address] [facility] Attending Dawson Haro MD   Hosp Day # 1 PCP Katiana Gorman MD         Assessment and Pl respiratory effort  CV: Heart with regular rate and rhythm,no pathologic murmur  Abd: Abdomen soft, nontender, nondistended,  bowel sounds present  Ext:  no clubbing, no cyanosis  Neuro: no focal deficits  Skin: no rashes or lesions      Scheduled Meds: -------------------------- Atrial fibrillation - Left bundle branch block ABNORMAL When compared with ECG of 08/04/2021 15:28:20 Atrial fibrillation has replaced sinus rhythm Electronically signed on 08/08/2021 at 16:05 by MD Hunter Bishop

## 2021-08-09 NOTE — CONSULTS
[address] Patient Status:  Observation    1945 MRN [id number]   Location [address] [facility] Attending No att. providers found   [facility] Day # 1 PCP Millie Dhillon MD       Reason for C for a-flutter in [place]   • COLONOSCOPY     • COLONOSCOPY  11/18/2019   • COLONOSCOPY N/A 11/18/2019    Procedure: COLONOSCOPY;  Surgeon: Hans Dubon MD;  Location: [facility] ENDOSCOPY   • EP CARDIOVERSION 1X  8/4/2021        • HERNIA SURGERY     • OTHE HYDROcodone-acetaminophen (NORCO) 5-325 MG per tab 1 tablet, 1 tablet, Oral, Q4H PRN **OR** HYDROcodone-acetaminophen (NORCO) 5-325 MG per tab 2 tablet, 2 tablet, Oral, Q4H PRN  •  docusate sodium (COLACE) cap 100 mg, 100 mg, Oral, BID  •  PEG 3350 (Derrell Sybil diarrhea. No abdominal pain or blood. GENITOURINARY:  No Burning on urination. NEUROLOGICAL:  No headache, dizziness, syncope, paralysis, ataxia, numbness or tingling in the extremities. MUSCULOSKELETAL:  No muscle, back pain, joint pain or stiffness. isolation per protocol  -  Follow fever curve, wbc  -  Reviewed labs, micro, imaging reports, available old records    Thank you for allowing us to participate in the care of this patient. Please do not hesitate to call if you have any questions.    We will

## 2021-08-09 NOTE — RESPIRATORY THERAPY NOTE
Pt has had sleep study done in pass, was never used a cpap therapy. Pt declined the use of cpap for his admission at this time.

## 2021-08-09 NOTE — PLAN OF CARE
Patient alert and oriented. HR up to 140s, MD notified additional dose of PO Metoprolol given. 34 bts of V-tach overnight, MD notified no new orders. On RA during the day, 2L of oxygen at night. Voiding freely. No pain complaints overnight.  Xarelto on for and/or hematoma  - Assess quality of pulses, skin color and temperature  - Assess for signs of decreased coronary artery perfusion - ex.  Angina  - Evaluate fluid balance, assess for edema, trend weights  Outcome: Progressing  Goal: Absence of cardiac arrhy

## 2021-08-10 ENCOUNTER — PATIENT OUTREACH (OUTPATIENT)
Dept: CASE MANAGEMENT | Age: 76
End: 2021-08-10

## 2021-08-10 DIAGNOSIS — U07.1 COVID-19: ICD-10-CM

## 2021-08-10 DIAGNOSIS — I48.91 ATRIAL FIBRILLATION WITH RVR (HCC): ICD-10-CM

## 2021-08-10 DIAGNOSIS — I10 ESSENTIAL HYPERTENSION, BENIGN: ICD-10-CM

## 2021-08-10 DIAGNOSIS — Z02.9 ENCOUNTERS FOR UNSPECIFIED ADMINISTRATIVE PURPOSE: ICD-10-CM

## 2021-08-10 DIAGNOSIS — I48.91 ATRIAL FIBRILLATION WITH RAPID VENTRICULAR RESPONSE (HCC): ICD-10-CM

## 2021-08-10 PROCEDURE — 1111F DSCHRG MED/CURRENT MED MERGE: CPT

## 2021-08-10 NOTE — DISCHARGE SUMMARY
Hospital Discharge Diagnoses: Afib with RVR    Lace+ Score: 61  59-90 High Risk  29-58 Medium Risk  0-28   Low Risk. TCM Follow-Up Recommendation:  LACE 29-58:  Moderate Risk of readmission after discharge from the hospital.    Please refer to dictated D

## 2021-08-10 NOTE — PROGRESS NOTES
Initial Post Discharge Follow Up   Discharge Date: 8/9/21  Contact Date: 8/10/2021    Consent Verification:  Assessment Completed With: Patient  HIPAA Verified?   Yes    Discharge Dx:     A-Fib with RVR  COVID-19    Was TCC ordered: no        General: incentive spirometer at least ten times an hour while awake. NCM reviewed discharge instructions, medications, S&S of infection/blood clots/pneumonia and COVID-19 instructions with patient, he verbalized understanding of these.  Patient denies any further q External Cream Use twice daily as directed 40 g 1   • atorvastatin 40 MG Oral Tab Take 80 mg by mouth nightly. 2   • spironolactone 25 MG Oral Tab Take 25 mg by mouth once daily.   2   • Sennosides-Docusate Sodium (STOOL SOFTENER/LAXATIVE OR) Take by helen PCP?  (DME, meds, disease concerns, Etc): No     Follow up appointments:      Your appointments     Date & Time Appointment Department [facility])    Aug 17, 2021 12:30 PM CDT Video Visit with Esteban Cabrera NP [facility] ([facility] of your follow up appointments? yes    Is there any reason as to why you cannot make your appointments? No     NCM Reviewed upcoming Specialist Appt with patient     Yes, patient  Confirmed he has a virtual visit on 8/17/2021 with Neida WALLER  [address], [place] [address]

## 2021-08-10 NOTE — PROGRESS NOTES
NCM placed call to patient for TCM, LM requesting a call to [phone] back with a condition update.

## 2021-08-12 NOTE — DISCHARGE SUMMARY
[facility]    PATIENT'S NAME: Leigha Ermelinda   ATTENDING PHYSICIAN: Abel Sutton MD   PATIENT ACCOUNT#:   [id number]    LOCATION:  [address] [place] [address] RECORD #:   [id number]       YOB: 1945  ADMISSION DATE:       08/08/2021 Cardiology as outpatient. All of the information was given to the patient. He verbalized understanding of information given. For a detailed review regarding the patient's hospitalization, please refer to the patient's chart.   He did state that he was at

## 2021-08-16 ENCOUNTER — TELEMEDICINE (OUTPATIENT)
Dept: INTERNAL MEDICINE CLINIC | Facility: CLINIC | Age: 76
End: 2021-08-16

## 2021-08-16 DIAGNOSIS — U07.1 COVID-19 VIRUS INFECTION: Primary | ICD-10-CM

## 2021-08-16 DIAGNOSIS — I10 ESSENTIAL HYPERTENSION, BENIGN: ICD-10-CM

## 2021-08-16 DIAGNOSIS — G50.0 TRIGEMINAL NEURALGIA: ICD-10-CM

## 2021-08-16 DIAGNOSIS — I25.10 CORONARY ARTERY DISEASE INVOLVING NATIVE CORONARY ARTERY OF NATIVE HEART WITHOUT ANGINA PECTORIS: ICD-10-CM

## 2021-08-16 DIAGNOSIS — I48.20 CHRONIC ATRIAL FIBRILLATION WITH RAPID VENTRICULAR RESPONSE (HCC): ICD-10-CM

## 2021-08-16 DIAGNOSIS — E78.00 PURE HYPERCHOLESTEROLEMIA: ICD-10-CM

## 2021-08-16 DIAGNOSIS — I50.21 ACUTE SYSTOLIC CONGESTIVE HEART FAILURE (HCC): ICD-10-CM

## 2021-08-16 PROCEDURE — 99214 OFFICE O/P EST MOD 30 MIN: CPT | Performed by: INTERNAL MEDICINE

## 2021-08-16 NOTE — PROGRESS NOTES
[address] Patient Status:  No patient class for patient encounter    1945 MRN [id number]   Location MD Dr. Jeanne Bland is a 77year old m and wheezing  Cardiovascular: negative for chest pain, exertional chest pressure/discomfort, near-syncope, orthopnea and  Gastrointestinal: negative for abdominal pain, diarrhea, melena, nausea and vomiting  Hematologic/lymphatic: negative  Musculoskeletal APN/clinic. 20 minutes spent with this patient providing counseling, coordination of care and education given. Patient receptive. Assessment:  Covid 19 viral  pneumonia and acute respiratory failure with hypoxemia  -symptom onset ?  8/5 , + covid 19 on 8 and no fever  the last 24 hours- thru 8/24 /2021    Wear a mask when going out of the home and continue frequent handwashing.      Use your incentive spirometer 4 sets of 10 breaths daily followed by cough    Follow up with Dr. Chacon  on 8/24/21     Follow up

## 2021-08-17 ENCOUNTER — TELEMEDICINE (OUTPATIENT)
Dept: CARDIOLOGY CLINIC | Facility: HOSPITAL | Age: 76
End: 2021-08-17
Payer: MEDICARE

## 2021-08-17 DIAGNOSIS — I10 ESSENTIAL HYPERTENSION, BENIGN: ICD-10-CM

## 2021-08-17 DIAGNOSIS — I48.20 CHRONIC ATRIAL FIBRILLATION (HCC): ICD-10-CM

## 2021-08-17 DIAGNOSIS — J12.82 PNEUMONIA DUE TO COVID-19 VIRUS: ICD-10-CM

## 2021-08-17 DIAGNOSIS — U07.1 PNEUMONIA DUE TO COVID-19 VIRUS: ICD-10-CM

## 2021-08-17 DIAGNOSIS — I48.91 ATRIAL FIBRILLATION WITH RAPID VENTRICULAR RESPONSE (HCC): Primary | ICD-10-CM

## 2021-08-17 DIAGNOSIS — I42.0 DILATED CARDIOMYOPATHY (HCC): ICD-10-CM

## 2021-08-17 PROBLEM — I42.9 CARDIOMYOPATHY (HCC): Chronic | Status: ACTIVE | Noted: 2021-08-17

## 2021-08-17 PROCEDURE — 99204 OFFICE O/P NEW MOD 45 MIN: CPT | Performed by: NURSE PRACTITIONER

## 2021-08-17 RX ORDER — METOPROLOL TARTRATE 50 MG/1
50 TABLET, FILM COATED ORAL 2 TIMES DAILY
Qty: 60 TABLET | Refills: 5 | Status: ON HOLD | COMMUNITY
Start: 2021-08-17 | End: 2021-10-06

## 2021-08-17 NOTE — PATIENT INSTRUCTIONS
Increase metoprolol tartrate to 75 mg twice daily. Can take one and a half of the 50 mg tabs. Continue your same medications. Call with increased shortness of breath,  fatigue, or with fever, chills, cough, weakness, nausea, vomiting or diarrhea.

## 2021-08-19 RX ORDER — SPIRONOLACTONE 25 MG/1
25 TABLET ORAL DAILY
Qty: 90 TABLET | Refills: 0 | Status: SHIPPED | OUTPATIENT
Start: 2021-08-19 | End: 2021-10-14

## 2021-08-19 NOTE — TELEPHONE ENCOUNTER
Please review; protocol failed.  Or has no protocol    Requested Prescriptions   Pending Prescriptions Disp Refills    SPIRONOLACTONE 25 MG Oral Tab [Pharmacy Med Name: SPIRONOLACTONE 25MG TABLETS] 90 tablet 0     Sig: TAKE 1 TABLET(25 MG) BY MOUTH DAILY

## 2021-08-22 NOTE — PROGRESS NOTES
HPI/Subjective:     Patient ID: Sharifa Vital is a 77year old male. This is a telemedicine visit with live, interactive video and audio.       Patient understands and accepts financial responsibility for any deductible, co-insurance and/or co-pays assoc Units total) by mouth daily. 30 tablet 0   • Multiple Vitamins-Minerals (MERLIN MULTIVITAMIN FOR MEN) Oral Tab Take 1 tablet by mouth daily. • zinc sulfate 220 (50 Zn) MG Oral Cap Take 50 mg by mouth daily.      • XARELTO 20 MG Oral Tab TAKE 1 TABLET(20 M • TN (trigeminal neuralgia) 1985    Per NG: Anti-seizure medication   • TN (trigeminal neuralgia)       Past Surgical History:   Procedure Laterality Date   • ANGIOPLASTY (CORONARY)     • CABG      CABG and aortic valve replacement 2014   • CARDIOVERSIO encounter diagnosis)  Plan: currently not having much symptoms; had been treated with regeneron while in hospital. He will complete 10 days of isolation as per cdc guidelines.  He was told to call us back if he develops any  New symptoms.     (I48.20) Chron defined types were placed in this encounter.       Meds This Visit:  Requested Prescriptions      No prescriptions requested or ordered in this encounter       Imaging & Referrals:  None

## 2021-09-02 ENCOUNTER — OFFICE VISIT (OUTPATIENT)
Dept: SURGERY | Facility: CLINIC | Age: 76
End: 2021-09-02
Payer: MEDICARE

## 2021-09-02 ENCOUNTER — LAB ENCOUNTER (OUTPATIENT)
Dept: LAB | Facility: HOSPITAL | Age: 76
End: 2021-09-02
Attending: UROLOGY
Payer: MEDICARE

## 2021-09-02 VITALS
HEART RATE: 96 BPM | WEIGHT: 200 LBS | BODY MASS INDEX: 31 KG/M2 | SYSTOLIC BLOOD PRESSURE: 110 MMHG | DIASTOLIC BLOOD PRESSURE: 82 MMHG

## 2021-09-02 DIAGNOSIS — Z80.42 FAMILY HISTORY OF PROSTATE CANCER IN FATHER: ICD-10-CM

## 2021-09-02 DIAGNOSIS — R35.0 BENIGN PROSTATIC HYPERPLASIA WITH URINARY FREQUENCY: Primary | ICD-10-CM

## 2021-09-02 DIAGNOSIS — R31.29 MICROHEMATURIA: ICD-10-CM

## 2021-09-02 DIAGNOSIS — Z12.5 PROSTATE CANCER SCREENING: ICD-10-CM

## 2021-09-02 DIAGNOSIS — Z79.01 ON RIVAROXABAN THERAPY: ICD-10-CM

## 2021-09-02 DIAGNOSIS — R35.1 NOCTURIA: ICD-10-CM

## 2021-09-02 DIAGNOSIS — Z79.82 ASPIRIN LONG-TERM USE: ICD-10-CM

## 2021-09-02 DIAGNOSIS — N40.1 BENIGN PROSTATIC HYPERPLASIA WITH URINARY FREQUENCY: Primary | ICD-10-CM

## 2021-09-02 DIAGNOSIS — N52.01 ERECTILE DYSFUNCTION DUE TO ARTERIAL INSUFFICIENCY: ICD-10-CM

## 2021-09-02 LAB — COMPLEXED PSA SERPL-MCNC: 3 NG/ML (ref ?–4)

## 2021-09-02 PROCEDURE — 36415 COLL VENOUS BLD VENIPUNCTURE: CPT

## 2021-09-02 PROCEDURE — 99214 OFFICE O/P EST MOD 30 MIN: CPT | Performed by: UROLOGY

## 2021-09-02 RX ORDER — LISINOPRIL 2.5 MG/1
2.5 TABLET ORAL DAILY
COMMUNITY
Start: 2021-08-25 | End: 2021-10-14

## 2021-09-02 RX ORDER — ATORVASTATIN CALCIUM 80 MG/1
80 TABLET, FILM COATED ORAL NIGHTLY
COMMUNITY
Start: 2021-07-05

## 2021-09-02 NOTE — PROGRESS NOTES
HPI:    Patient ID: Carla Rivers is a 77year old male. HPI     History of Microhematuria   Problem started 09/02/2020 when UA RBC = 4. 10/2020 negative complete microhematuria work up. Most recent 08/08/2021 microscopic not indicated.  He presently de paternal grandfather,  of prostate cancer at age 80 and 80-80, respectively. He presently denies any associated symptoms to suggest prostate cancer such as unintentional weight loss, loss of appetite, or bone pain.  Patient feels this is stable as he is daily.     • spironolactone 25 MG Oral Tab Take 1 tablet (25 mg total) by mouth daily. 90 tablet 0   • metoprolol tartrate 50 MG Oral Tab Take 1.5 tablets (75 mg total) by mouth 2x Daily(Beta Blocker).  60 tablet 5   • ascorbic acid 1000 MG Oral Tab Take 1 repair    • Hernia, inguinal, bilateral    • Herniated disc    • Herniated disc    • High blood pressure    • High cholesterol    • Hyperlipidemia    • Inguinal hernia recurrent unilateral 7/4/2013   • Pulmonary hypertension (HCC)    • Pulmonary hypertensi 0.8 standard drinks      Types: 1 Cans of beer per week      Comment: Occasionally    Drug use: Never       Over the past month, how often have you had a sensation of not emptying your bladder completely after you finish urinating?: Less than 1 time in 5 = 86  08/08/2021 UA blood = negative; protein = negative; microscopic not indicated  10/01/2020 Urine cytology = negative for high-grade urothelial carcinoma   09/16/2020 Creatinine = 0.80; GFR = 88   09/02/2020 UA blood = negative; protein = 30; WBC = 2; tract. Most recent 08/08/2021 UA microscopic not indicated. Patient denies any gross hematuria or associated symptoms.  He agrees to submit UA in one year     (N40.1,  R35.0) Benign prostatic hyperplasia with urinary frequency  (primary encounter diagnosis) PSA = 2.96 normal, but elevated compared to 12/20/2017 when PSA = 1.9. Most recent PSA not complete.  I explained that according to AUA guidelines that with patient's age and current PSA, it is not recommended that he get more PSA testing, however, in light this encounter       Imaging & Referrals:  None     ID#[id number]  By signing my name below, I, Genny Dominique,  attest that this documentation has been prepared under the direction and in the presence of Abner Marcelino MD.   Electronically Signed: Sue Dowell

## 2021-09-02 NOTE — PATIENT INSTRUCTIONS
Ottoniel Alba M.D.       1. Blood draw for PSA prostate cancer screening today--you have decided that you want to continue annual testing. As a reference point on 7/17/2020 PSA was 2.96.   Today you do not have any p [Nutrition/ Exercise/ Weight Management] : nutrition, exercise, weight management [Body Image] : body image [Breast Self Exam] : breast self exam [Contraception/ Emergency Contraception/ Safe Sexual Practices] : contraception, emergency contraception, safe sexual practices

## 2021-09-16 ENCOUNTER — ORDER TRANSCRIPTION (OUTPATIENT)
Dept: ADMINISTRATIVE | Facility: HOSPITAL | Age: 76
End: 2021-09-16

## 2021-09-16 DIAGNOSIS — Z01.810 PRE-OPERATIVE CARDIOVASCULAR EXAMINATION: ICD-10-CM

## 2021-09-16 DIAGNOSIS — I48.19 PERSISTENT ATRIAL FIBRILLATION (HCC): Primary | ICD-10-CM

## 2021-09-16 DIAGNOSIS — Z01.810 PREPROCEDURAL CARDIOVASCULAR EXAMINATION: ICD-10-CM

## 2021-09-27 ENCOUNTER — HOSPITAL ENCOUNTER (OUTPATIENT)
Dept: CT IMAGING | Facility: HOSPITAL | Age: 76
Discharge: HOME | End: 2021-09-27
Attending: INTERNAL MEDICINE
Payer: MEDICARE

## 2021-09-27 VITALS
SYSTOLIC BLOOD PRESSURE: 109 MMHG | RESPIRATION RATE: 17 BRPM | DIASTOLIC BLOOD PRESSURE: 83 MMHG | BODY MASS INDEX: 31.39 KG/M2 | OXYGEN SATURATION: 98 % | HEART RATE: 88 BPM | HEIGHT: 67 IN | WEIGHT: 200 LBS

## 2021-09-27 DIAGNOSIS — I48.19 PERSISTENT ATRIAL FIBRILLATION (HCC): ICD-10-CM

## 2021-09-27 DIAGNOSIS — Z01.810 PREPROCEDURAL CARDIOVASCULAR EXAMINATION: ICD-10-CM

## 2021-09-27 PROCEDURE — 75574 CT ANGIO HRT W/3D IMAGE: CPT | Performed by: INTERNAL MEDICINE

## 2021-09-27 PROCEDURE — 82565 ASSAY OF CREATININE: CPT

## 2021-09-27 RX ORDER — DILTIAZEM HYDROCHLORIDE 5 MG/ML
5 INJECTION INTRAVENOUS SEE ADMIN INSTRUCTIONS
Status: DISCONTINUED | OUTPATIENT
Start: 2021-09-27 | End: 2021-09-29

## 2021-09-27 RX ORDER — METOPROLOL TARTRATE 5 MG/5ML
5 INJECTION INTRAVENOUS SEE ADMIN INSTRUCTIONS
Status: DISCONTINUED | OUTPATIENT
Start: 2021-09-27 | End: 2021-09-29

## 2021-09-27 RX ORDER — NITROGLYCERIN 0.4 MG/1
0.4 TABLET SUBLINGUAL ONCE
Status: COMPLETED | OUTPATIENT
Start: 2021-09-27 | End: 2021-09-27

## 2021-09-27 RX ADMIN — Medication 100 MG: at 09:40:00

## 2021-09-27 RX ADMIN — Medication 100 MG: at 09:05:00

## 2021-09-27 RX ADMIN — NITROGLYCERIN 0.4 MG: 0.4 TABLET SUBLINGUAL at 10:41:00

## 2021-09-27 NOTE — IMAGING NOTE
TO RAD HOLDING AT 0845      HX TAKEN : pre ablation    PT CONSENTED AT 0852     BASELINE VITAL SIGNS   HR 71  /64 BMI 31.3 /  lb    CTA ORDERED BY DR Zaidi Generous WAS PT GIVEN CTA  PREMEDS , IF  mg po x 2 doses    METOPROLOL PO GIVEN 100 MILLIGR

## 2021-09-29 ENCOUNTER — NURSE ONLY (OUTPATIENT)
Dept: LAB | Facility: HOSPITAL | Age: 76
End: 2021-09-29
Attending: INTERNAL MEDICINE
Payer: MEDICARE

## 2021-09-29 DIAGNOSIS — Z01.818 PRE-OP TESTING: ICD-10-CM

## 2021-09-29 PROCEDURE — 36415 COLL VENOUS BLD VENIPUNCTURE: CPT

## 2021-09-29 PROCEDURE — 80053 COMPREHEN METABOLIC PANEL: CPT

## 2021-09-29 PROCEDURE — 85027 COMPLETE CBC AUTOMATED: CPT

## 2021-09-29 PROCEDURE — 93010 ELECTROCARDIOGRAM REPORT: CPT | Performed by: INTERNAL MEDICINE

## 2021-09-29 PROCEDURE — 93005 ELECTROCARDIOGRAM TRACING: CPT

## 2021-10-05 ENCOUNTER — MED REC SCAN ONLY (OUTPATIENT)
Dept: INTERNAL MEDICINE CLINIC | Facility: CLINIC | Age: 76
End: 2021-10-05

## 2021-10-06 ENCOUNTER — HOSPITAL ENCOUNTER (OUTPATIENT)
Dept: INTERVENTIONAL RADIOLOGY/VASCULAR | Facility: HOSPITAL | Age: 76
Discharge: HOME | DRG: 274 | End: 2021-10-06
Attending: INTERNAL MEDICINE
Payer: MEDICARE

## 2021-10-06 ENCOUNTER — HOSPITAL ENCOUNTER (OUTPATIENT)
Dept: CV DIAGNOSTICS | Facility: HOSPITAL | Age: 76
Discharge: HOME | DRG: 274 | End: 2021-10-06
Attending: INTERNAL MEDICINE
Payer: MEDICARE

## 2021-10-06 ENCOUNTER — ANESTHESIA (OUTPATIENT)
Dept: INTERVENTIONAL RADIOLOGY/VASCULAR | Facility: HOSPITAL | Age: 76
DRG: 274 | End: 2021-10-06
Payer: MEDICARE

## 2021-10-06 ENCOUNTER — HOSPITAL ENCOUNTER (INPATIENT)
Dept: INTERVENTIONAL RADIOLOGY/VASCULAR | Facility: HOSPITAL | Age: 76
LOS: 2 days | Discharge: HOME | DRG: 274 | End: 2021-10-08
Attending: INTERNAL MEDICINE | Admitting: INTERNAL MEDICINE
Payer: MEDICARE

## 2021-10-06 DIAGNOSIS — I48.19 PERSISTENT ATRIAL FIBRILLATION (HCC): ICD-10-CM

## 2021-10-06 DIAGNOSIS — Z01.818 PRE-OP TESTING: Primary | ICD-10-CM

## 2021-10-06 PROCEDURE — 85347 COAGULATION TIME ACTIVATED: CPT

## 2021-10-06 PROCEDURE — 93010 ELECTROCARDIOGRAM REPORT: CPT | Performed by: INTERNAL MEDICINE

## 2021-10-06 PROCEDURE — B246ZZZ ULTRASONOGRAPHY OF RIGHT AND LEFT HEART: ICD-10-PCS | Performed by: INTERNAL MEDICINE

## 2021-10-06 PROCEDURE — 93320 DOPPLER ECHO COMPLETE: CPT | Performed by: INTERNAL MEDICINE

## 2021-10-06 PROCEDURE — B246ZZ4 ULTRASONOGRAPHY OF RIGHT AND LEFT HEART, TRANSESOPHAGEAL: ICD-10-PCS | Performed by: INTERNAL MEDICINE

## 2021-10-06 PROCEDURE — 93613 INTRACARDIAC EPHYS 3D MAPG: CPT

## 2021-10-06 PROCEDURE — 93325 DOPPLER ECHO COLOR FLOW MAPG: CPT | Performed by: INTERNAL MEDICINE

## 2021-10-06 PROCEDURE — 4A0234Z MEASUREMENT OF CARDIAC ELECTRICAL ACTIVITY, PERCUTANEOUS APPROACH: ICD-10-PCS | Performed by: INTERNAL MEDICINE

## 2021-10-06 PROCEDURE — 80048 BASIC METABOLIC PNL TOTAL CA: CPT | Performed by: INTERNAL MEDICINE

## 2021-10-06 PROCEDURE — 99152 MOD SED SAME PHYS/QHP 5/>YRS: CPT

## 2021-10-06 PROCEDURE — 02583ZZ DESTRUCTION OF CONDUCTION MECHANISM, PERCUTANEOUS APPROACH: ICD-10-PCS | Performed by: INTERNAL MEDICINE

## 2021-10-06 PROCEDURE — 36415 COLL VENOUS BLD VENIPUNCTURE: CPT

## 2021-10-06 PROCEDURE — 93005 ELECTROCARDIOGRAM TRACING: CPT

## 2021-10-06 PROCEDURE — 83735 ASSAY OF MAGNESIUM: CPT | Performed by: INTERNAL MEDICINE

## 2021-10-06 PROCEDURE — 93662 INTRACARDIAC ECG (ICE): CPT

## 2021-10-06 PROCEDURE — 93312 ECHO TRANSESOPHAGEAL: CPT

## 2021-10-06 PROCEDURE — 93656 COMPRE EP EVAL ABLTJ ATR FIB: CPT

## 2021-10-06 RX ORDER — ATORVASTATIN CALCIUM 80 MG/1
80 TABLET, FILM COATED ORAL NIGHTLY
Status: DISCONTINUED | OUTPATIENT
Start: 2021-10-06 | End: 2021-10-08

## 2021-10-06 RX ORDER — MORPHINE SULFATE 2 MG/ML
2 INJECTION, SOLUTION INTRAMUSCULAR; INTRAVENOUS EVERY 10 MIN PRN
Status: DISCONTINUED | OUTPATIENT
Start: 2021-10-06 | End: 2021-10-06 | Stop reason: HOSPADM

## 2021-10-06 RX ORDER — MIDAZOLAM HYDROCHLORIDE 1 MG/ML
INJECTION INTRAMUSCULAR; INTRAVENOUS
Status: DISPENSED
Start: 2021-10-06 | End: 2021-10-06

## 2021-10-06 RX ORDER — HYDROMORPHONE HYDROCHLORIDE 1 MG/ML
0.2 INJECTION, SOLUTION INTRAMUSCULAR; INTRAVENOUS; SUBCUTANEOUS EVERY 5 MIN PRN
Status: DISCONTINUED | OUTPATIENT
Start: 2021-10-06 | End: 2021-10-06 | Stop reason: HOSPADM

## 2021-10-06 RX ORDER — METOPROLOL TARTRATE 5 MG/5ML
2.5 INJECTION INTRAVENOUS ONCE
Status: DISCONTINUED | OUTPATIENT
Start: 2021-10-06 | End: 2021-10-06 | Stop reason: HOSPADM

## 2021-10-06 RX ORDER — NEOSTIGMINE METHYLSULFATE 1 MG/ML
INJECTION INTRAVENOUS AS NEEDED
Status: DISCONTINUED | OUTPATIENT
Start: 2021-10-06 | End: 2021-10-06 | Stop reason: SURG

## 2021-10-06 RX ORDER — ASPIRIN 81 MG/1
81 TABLET, CHEWABLE ORAL NIGHTLY
Status: DISCONTINUED | OUTPATIENT
Start: 2021-10-06 | End: 2021-10-08

## 2021-10-06 RX ORDER — MIDAZOLAM HYDROCHLORIDE 1 MG/ML
INJECTION INTRAMUSCULAR; INTRAVENOUS
Status: DISCONTINUED
Start: 2021-10-06 | End: 2021-10-06 | Stop reason: WASHOUT

## 2021-10-06 RX ORDER — GLYCOPYRROLATE 0.2 MG/ML
INJECTION, SOLUTION INTRAMUSCULAR; INTRAVENOUS AS NEEDED
Status: DISCONTINUED | OUTPATIENT
Start: 2021-10-06 | End: 2021-10-06 | Stop reason: SURG

## 2021-10-06 RX ORDER — SODIUM CHLORIDE, SODIUM LACTATE, POTASSIUM CHLORIDE, CALCIUM CHLORIDE 600; 310; 30; 20 MG/100ML; MG/100ML; MG/100ML; MG/100ML
INJECTION, SOLUTION INTRAVENOUS CONTINUOUS
Status: DISCONTINUED | OUTPATIENT
Start: 2021-10-06 | End: 2021-10-06 | Stop reason: HOSPADM

## 2021-10-06 RX ORDER — ROCURONIUM BROMIDE 10 MG/ML
INJECTION, SOLUTION INTRAVENOUS AS NEEDED
Status: DISCONTINUED | OUTPATIENT
Start: 2021-10-06 | End: 2021-10-06 | Stop reason: SURG

## 2021-10-06 RX ORDER — HEPARIN SODIUM 1000 [USP'U]/ML
INJECTION, SOLUTION INTRAVENOUS; SUBCUTANEOUS AS NEEDED
Status: DISCONTINUED | OUTPATIENT
Start: 2021-10-06 | End: 2021-10-06 | Stop reason: SURG

## 2021-10-06 RX ORDER — SODIUM CHLORIDE 9 MG/ML
INJECTION, SOLUTION INTRAVENOUS
Status: ACTIVE | OUTPATIENT
Start: 2021-10-06 | End: 2021-10-06

## 2021-10-06 RX ORDER — MORPHINE SULFATE 10 MG/ML
6 INJECTION, SOLUTION INTRAMUSCULAR; INTRAVENOUS EVERY 10 MIN PRN
Status: DISCONTINUED | OUTPATIENT
Start: 2021-10-06 | End: 2021-10-06 | Stop reason: HOSPADM

## 2021-10-06 RX ORDER — SODIUM CHLORIDE 0.9 % (FLUSH) 0.9 %
10 SYRINGE (ML) INJECTION AS NEEDED
Status: DISCONTINUED | OUTPATIENT
Start: 2021-10-06 | End: 2021-10-08

## 2021-10-06 RX ORDER — ONDANSETRON 2 MG/ML
INJECTION INTRAMUSCULAR; INTRAVENOUS AS NEEDED
Status: DISCONTINUED | OUTPATIENT
Start: 2021-10-06 | End: 2021-10-06 | Stop reason: SURG

## 2021-10-06 RX ORDER — ASCORBIC ACID 500 MG
1000 TABLET ORAL DAILY
Status: DISCONTINUED | OUTPATIENT
Start: 2021-10-07 | End: 2021-10-08

## 2021-10-06 RX ORDER — PROCHLORPERAZINE EDISYLATE 5 MG/ML
5 INJECTION INTRAMUSCULAR; INTRAVENOUS ONCE AS NEEDED
Status: DISCONTINUED | OUTPATIENT
Start: 2021-10-06 | End: 2021-10-06

## 2021-10-06 RX ORDER — LIDOCAINE HYDROCHLORIDE 20 MG/ML
INJECTION, SOLUTION EPIDURAL; INFILTRATION; INTRACAUDAL; PERINEURAL
Status: COMPLETED
Start: 2021-10-06 | End: 2021-10-06

## 2021-10-06 RX ORDER — PROTAMINE SULFATE 10 MG/ML
INJECTION, SOLUTION INTRAVENOUS
Status: COMPLETED
Start: 2021-10-06 | End: 2021-10-06

## 2021-10-06 RX ORDER — MORPHINE SULFATE 4 MG/ML
4 INJECTION, SOLUTION INTRAMUSCULAR; INTRAVENOUS EVERY 10 MIN PRN
Status: DISCONTINUED | OUTPATIENT
Start: 2021-10-06 | End: 2021-10-06 | Stop reason: HOSPADM

## 2021-10-06 RX ORDER — HYDROMORPHONE HYDROCHLORIDE 1 MG/ML
0.6 INJECTION, SOLUTION INTRAMUSCULAR; INTRAVENOUS; SUBCUTANEOUS EVERY 5 MIN PRN
Status: DISCONTINUED | OUTPATIENT
Start: 2021-10-06 | End: 2021-10-06 | Stop reason: HOSPADM

## 2021-10-06 RX ORDER — SODIUM CHLORIDE, SODIUM LACTATE, POTASSIUM CHLORIDE, CALCIUM CHLORIDE 600; 310; 30; 20 MG/100ML; MG/100ML; MG/100ML; MG/100ML
INJECTION, SOLUTION INTRAVENOUS CONTINUOUS PRN
Status: DISCONTINUED | OUTPATIENT
Start: 2021-10-06 | End: 2021-10-06 | Stop reason: SURG

## 2021-10-06 RX ORDER — HEPARIN SODIUM 1000 [USP'U]/ML
INJECTION, SOLUTION INTRAVENOUS; SUBCUTANEOUS
Status: COMPLETED
Start: 2021-10-06 | End: 2021-10-06

## 2021-10-06 RX ORDER — LEVOTHYROXINE SODIUM 0.1 MG/1
100 TABLET ORAL
Status: DISCONTINUED | OUTPATIENT
Start: 2021-10-07 | End: 2021-10-08

## 2021-10-06 RX ORDER — LIDOCAINE HYDROCHLORIDE 20 MG/ML
SOLUTION OROPHARYNGEAL
Status: DISPENSED
Start: 2021-10-06 | End: 2021-10-06

## 2021-10-06 RX ORDER — SPIRONOLACTONE 25 MG/1
25 TABLET ORAL DAILY
Status: DISCONTINUED | OUTPATIENT
Start: 2021-10-06 | End: 2021-10-06

## 2021-10-06 RX ORDER — NALOXONE HYDROCHLORIDE 0.4 MG/ML
80 INJECTION, SOLUTION INTRAMUSCULAR; INTRAVENOUS; SUBCUTANEOUS AS NEEDED
Status: DISCONTINUED | OUTPATIENT
Start: 2021-10-06 | End: 2021-10-06 | Stop reason: HOSPADM

## 2021-10-06 RX ORDER — HYDROMORPHONE HYDROCHLORIDE 1 MG/ML
0.4 INJECTION, SOLUTION INTRAMUSCULAR; INTRAVENOUS; SUBCUTANEOUS EVERY 5 MIN PRN
Status: DISCONTINUED | OUTPATIENT
Start: 2021-10-06 | End: 2021-10-06 | Stop reason: HOSPADM

## 2021-10-06 RX ORDER — ONDANSETRON 2 MG/ML
4 INJECTION INTRAMUSCULAR; INTRAVENOUS ONCE AS NEEDED
Status: DISCONTINUED | OUTPATIENT
Start: 2021-10-06 | End: 2021-10-06 | Stop reason: HOSPADM

## 2021-10-06 RX ORDER — PROTAMINE SULFATE 10 MG/ML
INJECTION, SOLUTION INTRAVENOUS AS NEEDED
Status: DISCONTINUED | OUTPATIENT
Start: 2021-10-06 | End: 2021-10-06 | Stop reason: SURG

## 2021-10-06 RX ORDER — LISINOPRIL 2.5 MG/1
2.5 TABLET ORAL DAILY
Status: DISCONTINUED | OUTPATIENT
Start: 2021-10-07 | End: 2021-10-06

## 2021-10-06 RX ORDER — DEXAMETHASONE SODIUM PHOSPHATE 4 MG/ML
VIAL (ML) INJECTION AS NEEDED
Status: DISCONTINUED | OUTPATIENT
Start: 2021-10-06 | End: 2021-10-06 | Stop reason: SURG

## 2021-10-06 RX ORDER — PHENYLEPHRINE HCL 10 MG/ML
VIAL (ML) INJECTION AS NEEDED
Status: DISCONTINUED | OUTPATIENT
Start: 2021-10-06 | End: 2021-10-06 | Stop reason: SURG

## 2021-10-06 RX ORDER — EPHEDRINE SULFATE 50 MG/ML
5 INJECTION INTRAVENOUS ONCE
Status: COMPLETED | OUTPATIENT
Start: 2021-10-06 | End: 2021-10-06

## 2021-10-06 RX ORDER — SODIUM CHLORIDE 9 MG/ML
INJECTION, SOLUTION INTRAVENOUS CONTINUOUS
Status: DISCONTINUED | OUTPATIENT
Start: 2021-10-06 | End: 2021-10-08

## 2021-10-06 RX ORDER — HYDROCODONE BITARTRATE AND ACETAMINOPHEN 5; 325 MG/1; MG/1
2 TABLET ORAL AS NEEDED
Status: DISCONTINUED | OUTPATIENT
Start: 2021-10-06 | End: 2021-10-06 | Stop reason: HOSPADM

## 2021-10-06 RX ORDER — HYDROCODONE BITARTRATE AND ACETAMINOPHEN 5; 325 MG/1; MG/1
1 TABLET ORAL AS NEEDED
Status: DISCONTINUED | OUTPATIENT
Start: 2021-10-06 | End: 2021-10-06 | Stop reason: HOSPADM

## 2021-10-06 RX ORDER — ISOPROTERENOL HYDROCHLORIDE 0.2 MG/ML
INJECTION, SOLUTION INTRAMUSCULAR; INTRAVENOUS
Status: COMPLETED
Start: 2021-10-06 | End: 2021-10-06

## 2021-10-06 RX ORDER — CARBAMAZEPINE 400 MG/1
400 TABLET, EXTENDED RELEASE ORAL 2 TIMES DAILY
Status: DISCONTINUED | OUTPATIENT
Start: 2021-10-06 | End: 2021-10-08

## 2021-10-06 RX ORDER — ETOMIDATE 2 MG/ML
INJECTION INTRAVENOUS AS NEEDED
Status: DISCONTINUED | OUTPATIENT
Start: 2021-10-06 | End: 2021-10-06 | Stop reason: SURG

## 2021-10-06 RX ORDER — DOFETILIDE 0.25 MG/1
500 CAPSULE ORAL EVERY 12 HOURS
Status: DISCONTINUED | OUTPATIENT
Start: 2021-10-06 | End: 2021-10-07

## 2021-10-06 RX ADMIN — HEPARIN SODIUM 5000 UNITS: 1000 INJECTION, SOLUTION INTRAVENOUS; SUBCUTANEOUS at 14:00:00

## 2021-10-06 RX ADMIN — ASPIRIN 81 MG: 81 TABLET, CHEWABLE ORAL at 20:24:00

## 2021-10-06 RX ADMIN — HEPARIN SODIUM 10000 UNITS: 1000 INJECTION, SOLUTION INTRAVENOUS; SUBCUTANEOUS at 13:43:00

## 2021-10-06 RX ADMIN — ATORVASTATIN CALCIUM 80 MG: 80 TABLET, FILM COATED ORAL at 20:24:00

## 2021-10-06 RX ADMIN — HEPARIN SODIUM 5000 UNITS: 1000 INJECTION, SOLUTION INTRAVENOUS; SUBCUTANEOUS at 13:34:00

## 2021-10-06 RX ADMIN — NEOSTIGMINE METHYLSULFATE 5 MG: 1 INJECTION INTRAVENOUS at 15:04:00

## 2021-10-06 RX ADMIN — PHENYLEPHRINE HCL 100 MCG: 10 MG/ML VIAL (ML) INJECTION at 13:12:00

## 2021-10-06 RX ADMIN — DOFETILIDE 500 MCG: 0.25 CAPSULE ORAL at 20:25:00

## 2021-10-06 RX ADMIN — EPHEDRINE SULFATE 5 MG: 50 INJECTION INTRAVENOUS at 16:25:00

## 2021-10-06 RX ADMIN — ROCURONIUM BROMIDE 10 MG: 10 INJECTION, SOLUTION INTRAVENOUS at 14:17:00

## 2021-10-06 RX ADMIN — EPHEDRINE SULFATE 5 MG: 50 INJECTION INTRAVENOUS at 15:39:00

## 2021-10-06 RX ADMIN — SODIUM CHLORIDE: 9 INJECTION, SOLUTION INTRAVENOUS at 12:54:00

## 2021-10-06 RX ADMIN — ETOMIDATE 8 MG: 2 INJECTION INTRAVENOUS at 13:03:00

## 2021-10-06 RX ADMIN — SODIUM CHLORIDE, SODIUM LACTATE, POTASSIUM CHLORIDE, CALCIUM CHLORIDE: 600; 310; 30; 20 INJECTION, SOLUTION INTRAVENOUS at 15:23:00

## 2021-10-06 RX ADMIN — CARBAMAZEPINE 400 MG: 400 TABLET, EXTENDED RELEASE ORAL at 21:02:00

## 2021-10-06 RX ADMIN — SODIUM CHLORIDE, SODIUM LACTATE, POTASSIUM CHLORIDE, CALCIUM CHLORIDE: 600; 310; 30; 20 INJECTION, SOLUTION INTRAVENOUS at 14:03:00

## 2021-10-06 RX ADMIN — PHENYLEPHRINE HCL 100 MCG: 10 MG/ML VIAL (ML) INJECTION at 13:18:00

## 2021-10-06 RX ADMIN — HEPARIN SODIUM 2000 UNITS: 1000 INJECTION, SOLUTION INTRAVENOUS; SUBCUTANEOUS at 14:29:00

## 2021-10-06 RX ADMIN — ROCURONIUM BROMIDE 10 MG: 10 INJECTION, SOLUTION INTRAVENOUS at 14:37:00

## 2021-10-06 RX ADMIN — GLYCOPYRROLATE 1 MG: 0.2 INJECTION, SOLUTION INTRAMUSCULAR; INTRAVENOUS at 15:04:00

## 2021-10-06 RX ADMIN — ONDANSETRON 4 MG: 2 INJECTION INTRAMUSCULAR; INTRAVENOUS at 12:58:00

## 2021-10-06 RX ADMIN — DEXAMETHASONE SODIUM PHOSPHATE 4 MG: 4 MG/ML VIAL (ML) INJECTION at 12:58:00

## 2021-10-06 RX ADMIN — ROCURONIUM BROMIDE 2 MG: 10 INJECTION, SOLUTION INTRAVENOUS at 13:01:00

## 2021-10-06 RX ADMIN — SODIUM CHLORIDE: 9 INJECTION, SOLUTION INTRAVENOUS at 08:45:00

## 2021-10-06 RX ADMIN — PROTAMINE SULFATE 100 MG: 10 INJECTION, SOLUTION INTRAVENOUS at 15:02:00

## 2021-10-06 RX ADMIN — ROCURONIUM BROMIDE 48 MG: 10 INJECTION, SOLUTION INTRAVENOUS at 13:03:00

## 2021-10-06 NOTE — ANESTHESIA PROCEDURE NOTES
Airway  Date/Time: 10/6/2021 1:17 PM  Urgency: Elective    Airway not difficult    General Information and Staff    Patient location during procedure: OR  Anesthesiologist: Anni Thomas MD  Performed: anesthesiologist     Indications and Patient [place]

## 2021-10-06 NOTE — ANESTHESIA PREPROCEDURE EVALUATION
Anesthesia PreOp Note    HPI:     Michaelyn Gilford is a 77year old male who presents for preoperative consultation requested by: * No surgeons listed *    Date of Surgery: 10/6/2021    * No procedures listed *  Indication: * No pre-op diagnosis entered * coronary stents   • Disorder of thyroid    • Essential hypertension    • Extrinsic asthma, unspecified    • H/O hernia repair    • Hernia, inguinal, bilateral    • Herniated disc    • Herniated disc    • High blood pressure    • High cholesterol    • Hyper MEN) Oral Tab, Take 1 tablet by mouth daily. , Disp: , Rfl: , 10/5/2021 at Unknown time  zinc sulfate 220 (50 Zn) MG Oral Cap, Take 50 mg by mouth daily. , Disp: , Rfl: , 10/5/2021 at Unknown time  LEVOTHYROXINE SODIUM 100 MCG Oral Tab, TAKE 1 TABLET(100 MCG • Prostate Cancer Father    • Cancer Father    • Stroke Mother         Per NG: TIA's   • Other (Other) Mother         vascular dementia   • Kidney Disease Maternal Grandfather         Per NG kidney removal   • Skin cancer Maternal Grandfather    • Prosta No  Physical Activity:       Days of Exercise per Week: Not on file      Minutes of Exercise per Session: Not on file  Stress:       Feeling of Stress : Not on file  Social Connections:       Frequency of Communication with Friends and Family: Not on file reviewed    No history of anesthetic complications   Airway   Mallampati: II  TM distance: >3 FB  Neck ROM: full  Dental - normal exam         Pulmonary - normal exam    breath sounds clear to auscultation  (+) asthma (childhood), sleep apnea,   Cardiovasc COVID 8 weeks ago, only symptoms was mild cough, no SOB or oxygenation requirement. Plan for pre-induction arterial line, vasopressor/inotrope infusions available.     Discussed with patient and wife plan for general and his increased risk given above, t

## 2021-10-06 NOTE — PROGRESS NOTES
[address]atal[address]                                                            PROGRESS NOTE      Patient Name: Michaelyn Gilford MRN: [id number]   : 1945 CSN: [id number] 2.3 08/09/2021    TROP <0.045 08/08/2021    CK 64 08/08/2021

## 2021-10-06 NOTE — ANESTHESIA POSTPROCEDURE EVALUATION
Patient: Meli Eans    Procedure Summary     Date: 10/06/21 Room / Location: [place] [address].    Anesthesia Start: 8985 Anesthesia Stop: 9704    Procedure: EP PULMONARY VEIN/A-FIB ABLATION Diagnosis:       Persistent atrial fibril

## 2021-10-06 NOTE — PROCEDURES
[facility] - [facility]    Cardiac Electrophysiology Procedure Note      [facility]   [facility] [id number] MRN [id number]   Admission Date 10/6/2021 Procedure Date 10/6/2021   Attending Physician Wolf Colorado MD Procedure Physicia was performed for arrhythmia induction on and off isoproterenol. Sinus cycle length 1000 ms,   ms, HV 54 ms, AVBCL 340 ms, AVNERP 600/<340 ms, VABCL >600 ms.  With atrial extrastimuli, atrial fibrillation was induced, and this was cardioverted with 2 transferred to the PACU by the anesthesiologist in stable condition.       RESULTS:   - EP study results: mild sinus node dysfunction, normal AV conduction, no accessory pathways  - Successful laser balloon ablation pulmonary vein isolation for ablation of

## 2021-10-06 NOTE — IVS NOTE
Patient post ablation, right groin post punctured site intact with no s/s of bleeding or hematoma. Systolic blood pressure remain on the 80,s bu asymptomatic. patient denies dizziness or weakness, no diaphoresis. Comfortably eating dinner.  No nausea or vom

## 2021-10-07 ENCOUNTER — APPOINTMENT (OUTPATIENT)
Dept: GENERAL RADIOLOGY | Facility: HOSPITAL | Age: 76
DRG: 274 | End: 2021-10-07
Attending: NURSE PRACTITIONER
Payer: MEDICARE

## 2021-10-07 PROBLEM — Z01.818 PRE-OP TESTING: Status: ACTIVE | Noted: 2021-10-07

## 2021-10-07 PROCEDURE — 71045 X-RAY EXAM CHEST 1 VIEW: CPT | Performed by: NURSE PRACTITIONER

## 2021-10-07 PROCEDURE — 0202U NFCT DS 22 TRGT SARS-COV-2: CPT | Performed by: NURSE PRACTITIONER

## 2021-10-07 PROCEDURE — 83880 ASSAY OF NATRIURETIC PEPTIDE: CPT | Performed by: INTERNAL MEDICINE

## 2021-10-07 PROCEDURE — 93005 ELECTROCARDIOGRAM TRACING: CPT

## 2021-10-07 PROCEDURE — 80048 BASIC METABOLIC PNL TOTAL CA: CPT | Performed by: INTERNAL MEDICINE

## 2021-10-07 PROCEDURE — 83735 ASSAY OF MAGNESIUM: CPT | Performed by: INTERNAL MEDICINE

## 2021-10-07 PROCEDURE — 83735 ASSAY OF MAGNESIUM: CPT | Performed by: NURSE PRACTITIONER

## 2021-10-07 PROCEDURE — 93010 ELECTROCARDIOGRAM REPORT: CPT | Performed by: NURSE PRACTITIONER

## 2021-10-07 PROCEDURE — 80048 BASIC METABOLIC PNL TOTAL CA: CPT | Performed by: NURSE PRACTITIONER

## 2021-10-07 PROCEDURE — 93010 ELECTROCARDIOGRAM REPORT: CPT | Performed by: INTERNAL MEDICINE

## 2021-10-07 RX ORDER — SODIUM CHLORIDE 9 MG/ML
INJECTION, SOLUTION INTRAVENOUS
Status: ACTIVE | OUTPATIENT
Start: 2021-10-08 | End: 2021-10-08

## 2021-10-07 RX ORDER — MAGNESIUM OXIDE 400 MG (241.3 MG MAGNESIUM) TABLET
400 TABLET DAILY
Status: DISCONTINUED | OUTPATIENT
Start: 2021-10-07 | End: 2021-10-08

## 2021-10-07 RX ORDER — CHLORHEXIDINE GLUCONATE 4 G/100ML
30 SOLUTION TOPICAL
Status: COMPLETED | OUTPATIENT
Start: 2021-10-08 | End: 2021-10-08

## 2021-10-07 RX ORDER — DOFETILIDE 0.25 MG/1
250 CAPSULE ORAL EVERY 12 HOURS
Status: DISCONTINUED | OUTPATIENT
Start: 2021-10-07 | End: 2021-10-08

## 2021-10-07 RX ADMIN — ATORVASTATIN CALCIUM 80 MG: 80 TABLET, FILM COATED ORAL at 20:41:00

## 2021-10-07 RX ADMIN — LEVOTHYROXINE SODIUM 100 MCG: 0.1 TABLET ORAL at 05:12:00

## 2021-10-07 RX ADMIN — DOFETILIDE 250 MCG: 0.25 CAPSULE ORAL at 11:25:00

## 2021-10-07 RX ADMIN — MAGNESIUM OXIDE 400 MG (241.3 MG MAGNESIUM) TABLET 400 MG: TABLET at 09:48:00

## 2021-10-07 RX ADMIN — DOFETILIDE 250 MCG: 0.25 CAPSULE ORAL at 23:08:00

## 2021-10-07 RX ADMIN — ASPIRIN 81 MG: 81 TABLET, CHEWABLE ORAL at 20:41:00

## 2021-10-07 RX ADMIN — SODIUM CHLORIDE: 9 INJECTION, SOLUTION INTRAVENOUS at 23:08:00

## 2021-10-07 RX ADMIN — ASCORBIC ACID 1000 MG: 500 MG TABLET ORAL at 09:15:00

## 2021-10-07 RX ADMIN — CARBAMAZEPINE 400 MG: 400 TABLET, EXTENDED RELEASE ORAL at 09:23:00

## 2021-10-07 RX ADMIN — CARBAMAZEPINE 400 MG: 400 TABLET, EXTENDED RELEASE ORAL at 20:41:00

## 2021-10-07 NOTE — PROGRESS NOTES
[facility] Patient Status:  Inpatient    1945 MRN [id number]   Location [address] [place] [address] Attending Manda ml   Net 2090 ml       Wt Readings from Last 2 Encounters:  10/07/21 : 207 lb (93.9 kg)  09/27/21 : 200 lb (90.7 kg)      Physical Exam:    General: Alert and oriented x 3,  No apparent distress. HEENT: No focal deficits.   Neck: No JVD, carotids 2+ no bru

## 2021-10-07 NOTE — PLAN OF CARE
Joseph Centeno this morning, tikosyn dosing changed, patient swabbed for respiratory panel/covid - negative, plan for cardiac cath tomorrow     Problem: CARDIOVASCULAR - ADULT  Goal: Absence of cardiac arrhythmias or at baseline  Description: INTERVENTIONS:  - Co vasoactive medications to optimize hemodynamic stability  - Monitor arterial and/or venous puncture sites for bleeding and/or hematoma  - Assess quality of pulses, skin color and temperature  - Assess for signs of decreased coronary artery perfusion - ex.

## 2021-10-07 NOTE — PROGRESS NOTES
[facility] HOSP - [facility]     Cardiology Progress Note    Mark Anthony Best Patient Status:  Inpatient    1945 MRN [id number]   Location [facility] Attending Jessy Shankar MD   Hosp Day # 1 PCP Jose March MD       SUBJECTIVE:  68:80 No visual or hearing changes. Skin:  Denies any rashes or unusual skin lesions. Respiratory: Positive JIMENEZ and NP cough. Cardiovascular:  Denies any chest pain, dizziness, syncope or claudication.    GI:  Denies any abdominal pain, nausea, vomiting or gradient (S): 28mm Hg. Valve area      (VTI): 1.26cm^2. 3. Mitral valve: Mild to moderate regurgitation. 4. Left atrium: The atrium was severely dilated. 5. Right atrium: The atrium was dilated.    6. Tricuspid valve: Severe regurgitation directed laureano Q12H      Assessment/Plan:  · Persistent AF. 8/4/21: DCCV- SR. 10/6/21:  Cryo PVI performed and received 1 dose of Tikosyn 500 mcg on 10/6/21 ar 20:30. EKG at 22: 00:  QTc:  502 ms. No further Tikosyn was given.    · 10/7/21: 07:22:45: PVC initiated VT at APRN   10/7/2021  9:03 AM

## 2021-10-08 ENCOUNTER — APPOINTMENT (OUTPATIENT)
Dept: INTERVENTIONAL RADIOLOGY/VASCULAR | Facility: HOSPITAL | Age: 76
DRG: 274 | End: 2021-10-08
Attending: INTERNAL MEDICINE
Payer: MEDICARE

## 2021-10-08 VITALS
DIASTOLIC BLOOD PRESSURE: 80 MMHG | HEART RATE: 121 BPM | OXYGEN SATURATION: 95 % | WEIGHT: 207.88 LBS | TEMPERATURE: 98 F | BODY MASS INDEX: 32.63 KG/M2 | HEIGHT: 67 IN | RESPIRATION RATE: 20 BRPM | SYSTOLIC BLOOD PRESSURE: 111 MMHG

## 2021-10-08 PROCEDURE — B2151ZZ FLUOROSCOPY OF LEFT HEART USING LOW OSMOLAR CONTRAST: ICD-10-PCS | Performed by: INTERNAL MEDICINE

## 2021-10-08 PROCEDURE — 93010 ELECTROCARDIOGRAM REPORT: CPT | Performed by: NURSE PRACTITIONER

## 2021-10-08 PROCEDURE — B2111ZZ FLUOROSCOPY OF MULTIPLE CORONARY ARTERIES USING LOW OSMOLAR CONTRAST: ICD-10-PCS | Performed by: INTERNAL MEDICINE

## 2021-10-08 PROCEDURE — 4A023N7 MEASUREMENT OF CARDIAC SAMPLING AND PRESSURE, LEFT HEART, PERCUTANEOUS APPROACH: ICD-10-PCS | Performed by: INTERNAL MEDICINE

## 2021-10-08 PROCEDURE — 93458 L HRT ARTERY/VENTRICLE ANGIO: CPT

## 2021-10-08 PROCEDURE — 92978 ENDOLUMINL IVUS OCT C 1ST: CPT

## 2021-10-08 PROCEDURE — 99153 MOD SED SAME PHYS/QHP EA: CPT

## 2021-10-08 PROCEDURE — B241ZZ3 ULTRASONOGRAPHY OF MULTIPLE CORONARY ARTERIES, INTRAVASCULAR: ICD-10-PCS | Performed by: INTERNAL MEDICINE

## 2021-10-08 PROCEDURE — 80048 BASIC METABOLIC PNL TOTAL CA: CPT | Performed by: INTERNAL MEDICINE

## 2021-10-08 PROCEDURE — 83735 ASSAY OF MAGNESIUM: CPT | Performed by: INTERNAL MEDICINE

## 2021-10-08 PROCEDURE — 93010 ELECTROCARDIOGRAM REPORT: CPT | Performed by: INTERNAL MEDICINE

## 2021-10-08 PROCEDURE — 92979 ENDOLUMINL IVUS OCT C EA: CPT

## 2021-10-08 PROCEDURE — 93005 ELECTROCARDIOGRAM TRACING: CPT

## 2021-10-08 PROCEDURE — 99152 MOD SED SAME PHYS/QHP 5/>YRS: CPT

## 2021-10-08 PROCEDURE — B2121ZZ FLUOROSCOPY OF SINGLE CORONARY ARTERY BYPASS GRAFT USING LOW OSMOLAR CONTRAST: ICD-10-PCS | Performed by: INTERNAL MEDICINE

## 2021-10-08 RX ORDER — METOPROLOL TARTRATE 50 MG/1
50 TABLET, FILM COATED ORAL
Qty: 90 TABLET | Refills: 1 | Status: ON HOLD | OUTPATIENT
Start: 2021-10-09 | End: 2021-10-18

## 2021-10-08 RX ORDER — HEPARIN SODIUM 1000 [USP'U]/ML
INJECTION, SOLUTION INTRAVENOUS; SUBCUTANEOUS
Status: COMPLETED
Start: 2021-10-08 | End: 2021-10-08

## 2021-10-08 RX ORDER — LIDOCAINE HYDROCHLORIDE 20 MG/ML
INJECTION, SOLUTION EPIDURAL; INFILTRATION; INTRACAUDAL; PERINEURAL
Status: COMPLETED
Start: 2021-10-08 | End: 2021-10-08

## 2021-10-08 RX ORDER — DOFETILIDE 0.25 MG/1
250 CAPSULE ORAL EVERY 12 HOURS
Qty: 60 CAPSULE | Refills: 5 | Status: SHIPPED | OUTPATIENT
Start: 2021-10-08

## 2021-10-08 RX ORDER — VERAPAMIL HYDROCHLORIDE 2.5 MG/ML
INJECTION, SOLUTION INTRAVENOUS
Status: COMPLETED
Start: 2021-10-08 | End: 2021-10-08

## 2021-10-08 RX ORDER — MIDAZOLAM HYDROCHLORIDE 1 MG/ML
INJECTION INTRAMUSCULAR; INTRAVENOUS
Status: COMPLETED
Start: 2021-10-08 | End: 2021-10-08

## 2021-10-08 RX ORDER — METOPROLOL TARTRATE 50 MG/1
50 TABLET, FILM COATED ORAL
Status: DISCONTINUED | OUTPATIENT
Start: 2021-10-08 | End: 2021-10-08

## 2021-10-08 RX ORDER — MAGNESIUM OXIDE 400 MG (241.3 MG MAGNESIUM) TABLET
400 TABLET DAILY
Qty: 30 TABLET | Refills: 5 | Status: SHIPPED | OUTPATIENT
Start: 2021-10-09

## 2021-10-08 RX ADMIN — CHLORHEXIDINE GLUCONATE 30 ML: 4 SOLUTION TOPICAL at 00:00:00

## 2021-10-08 RX ADMIN — CARBAMAZEPINE 400 MG: 400 TABLET, EXTENDED RELEASE ORAL at 08:44:00

## 2021-10-08 RX ADMIN — DOFETILIDE 250 MCG: 0.25 CAPSULE ORAL at 11:00:00

## 2021-10-08 RX ADMIN — METOPROLOL TARTRATE 50 MG: 50 TABLET, FILM COATED ORAL at 16:40:00

## 2021-10-08 RX ADMIN — LEVOTHYROXINE SODIUM 100 MCG: 0.1 TABLET ORAL at 04:36:00

## 2021-10-08 NOTE — PLAN OF CARE
Problem: Patient Centered Care  Goal: Patient preferences are identified and integrated in the patient's plan of care  Description: Interventions:  - What would you like us to know as we care for you?  I have a lingering cough  - Provide timely, complete, ordered  - Initiate emergency measures for life threatening arrhythmias  - Monitor electrolytes and administer replacement therapy as ordered  Outcome: Progressing     Cardiac cath scheduled for today.

## 2021-10-08 NOTE — PLAN OF CARE
Problem: Patient Centered Care  Goal: Patient preferences are identified and integrated in the patient's plan of care  Description: Interventions:  - What would you like us to know as we care for you?  I have a lingering cough  - Provide timely, complete, antiarrhythmic and heart rate control medications as ordered  - Initiate emergency measures for life threatening arrhythmias  - Monitor electrolytes and administer replacement therapy as ordered  Outcome: Adequate for Discharge   Patient will be discharged

## 2021-10-08 NOTE — PROGRESS NOTES
Natalya Nicole 98                                                            PROGRESS NOTE      Patient Name: Lui Polo MRN: [id number]   : 1945 CSN: [id number] 1.61 (H) 06/14/2019    T4F 0.8 08/04/2021    TSH 3.440 08/08/2021    PSA 1.9 12/20/2017    DDIMER 0.36 08/09/2021    CRP 7.82 (H) 08/09/2021    MG 2.0 10/08/2021    TROP <0.045 08/08/2021    CK 64 08/08/2021

## 2021-10-08 NOTE — PROCEDURES
[facility]    Cardiac Cath Procedure Note  Smiley Brush Patient Status:  Inpatient    1945 MRN [id number]   Location [facility] Attending Shawnee Garcia MD   Hosp Day # 2 PCP Kathy Jackson MD mm²).      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 Compensated filling pressures.   A. fib status post ablation, persistent A. fib with RVR      Recommendations:  Continue rate and rhythm control per E

## 2021-10-08 NOTE — PROGRESS NOTES
Natalya Nicole 98                                                            PROGRESS NOTE      Patient Name: Maame Olson MRN: [id number]   : 1945 CSN: [id number] - sinus rhythm    ------------------------------------------------------------------------------------------------------------------------------

## 2021-10-11 ENCOUNTER — PATIENT OUTREACH (OUTPATIENT)
Dept: CASE MANAGEMENT | Age: 76
End: 2021-10-11

## 2021-10-11 NOTE — DISCHARGE SUMMARY
Outpatient Procedure Discharge Summary        Baltazar Muir   [facility] [id number] MRN [id number]   Admission Date 10/6/2021     Discharge Date 10/8/2021 Admitting Physician Laura Sol MD     Discharge Date:  10/8/2021    Discharge Farheen

## 2021-10-11 NOTE — PROGRESS NOTES
NCM placed call to patient for TCM, LM requesting a call to [phone] back with a condition update.

## 2021-10-12 NOTE — PROGRESS NOTES
LM for pt to call [facility] for TCM since discharge. [facility] phone number was provided for pt to call back.

## 2021-10-12 NOTE — H&P
[facility]    Cardiac Electrophysiology [facility]   [facility] [id number] MRN [id number]   Admission Date 10/18/2021 Procedure Date 10/18/2021   Attending Physician Kacey García MD Procedure Physician

## 2021-10-15 ENCOUNTER — LAB ENCOUNTER (OUTPATIENT)
Dept: LAB | Age: 76
End: 2021-10-15
Attending: INTERNAL MEDICINE
Payer: MEDICARE

## 2021-10-15 ENCOUNTER — NURSE ONLY (OUTPATIENT)
Dept: LAB | Age: 76
End: 2021-10-15
Attending: INTERNAL MEDICINE
Payer: MEDICARE

## 2021-10-15 DIAGNOSIS — Z01.818 PREOP TESTING: ICD-10-CM

## 2021-10-15 PROCEDURE — 80053 COMPREHEN METABOLIC PANEL: CPT

## 2021-10-15 PROCEDURE — 85025 COMPLETE CBC W/AUTO DIFF WBC: CPT

## 2021-10-15 PROCEDURE — 36415 COLL VENOUS BLD VENIPUNCTURE: CPT

## 2021-10-18 ENCOUNTER — HOSPITAL ENCOUNTER (OUTPATIENT)
Dept: INTERVENTIONAL RADIOLOGY/VASCULAR | Facility: HOSPITAL | Age: 76
Discharge: HOME | End: 2021-10-18
Attending: INTERNAL MEDICINE | Admitting: INTERNAL MEDICINE
Payer: MEDICARE

## 2021-10-18 ENCOUNTER — ANESTHESIA (OUTPATIENT)
Dept: INTERVENTIONAL RADIOLOGY/VASCULAR | Facility: HOSPITAL | Age: 76
End: 2021-10-18
Payer: MEDICARE

## 2021-10-18 VITALS
TEMPERATURE: 98 F | SYSTOLIC BLOOD PRESSURE: 108 MMHG | BODY MASS INDEX: 30.61 KG/M2 | HEIGHT: 67 IN | DIASTOLIC BLOOD PRESSURE: 83 MMHG | RESPIRATION RATE: 14 BRPM | OXYGEN SATURATION: 96 % | HEART RATE: 50 BPM | WEIGHT: 195 LBS

## 2021-10-18 DIAGNOSIS — Z01.818 PREOP TESTING: Primary | ICD-10-CM

## 2021-10-18 DIAGNOSIS — I48.91 ATRIAL FIBRILLATION (HCC): ICD-10-CM

## 2021-10-18 PROCEDURE — 93010 ELECTROCARDIOGRAM REPORT: CPT | Performed by: INTERNAL MEDICINE

## 2021-10-18 PROCEDURE — 92960 CARDIOVERSION ELECTRIC EXT: CPT

## 2021-10-18 PROCEDURE — 93005 ELECTROCARDIOGRAM TRACING: CPT

## 2021-10-18 PROCEDURE — 36415 COLL VENOUS BLD VENIPUNCTURE: CPT

## 2021-10-18 PROCEDURE — 5A2204Z RESTORATION OF CARDIAC RHYTHM, SINGLE: ICD-10-PCS | Performed by: INTERNAL MEDICINE

## 2021-10-18 RX ORDER — MORPHINE SULFATE 4 MG/ML
4 INJECTION, SOLUTION INTRAMUSCULAR; INTRAVENOUS EVERY 10 MIN PRN
Status: DISCONTINUED | OUTPATIENT
Start: 2021-10-18 | End: 2021-10-18

## 2021-10-18 RX ORDER — EPHEDRINE SULFATE 50 MG/ML
INJECTION INTRAVENOUS AS NEEDED
Status: DISCONTINUED | OUTPATIENT
Start: 2021-10-18 | End: 2021-10-18 | Stop reason: SURG

## 2021-10-18 RX ORDER — HYDROCODONE BITARTRATE AND ACETAMINOPHEN 5; 325 MG/1; MG/1
2 TABLET ORAL AS NEEDED
Status: DISCONTINUED | OUTPATIENT
Start: 2021-10-18 | End: 2021-10-18

## 2021-10-18 RX ORDER — LIDOCAINE HYDROCHLORIDE 10 MG/ML
INJECTION, SOLUTION EPIDURAL; INFILTRATION; INTRACAUDAL; PERINEURAL AS NEEDED
Status: DISCONTINUED | OUTPATIENT
Start: 2021-10-18 | End: 2021-10-18 | Stop reason: SURG

## 2021-10-18 RX ORDER — MORPHINE SULFATE 10 MG/ML
6 INJECTION, SOLUTION INTRAMUSCULAR; INTRAVENOUS EVERY 10 MIN PRN
Status: DISCONTINUED | OUTPATIENT
Start: 2021-10-18 | End: 2021-10-18

## 2021-10-18 RX ORDER — METOPROLOL TARTRATE 5 MG/5ML
2.5 INJECTION INTRAVENOUS ONCE
Status: DISCONTINUED | OUTPATIENT
Start: 2021-10-18 | End: 2021-10-18

## 2021-10-18 RX ORDER — HYDROMORPHONE HYDROCHLORIDE 1 MG/ML
0.6 INJECTION, SOLUTION INTRAMUSCULAR; INTRAVENOUS; SUBCUTANEOUS EVERY 5 MIN PRN
Status: DISCONTINUED | OUTPATIENT
Start: 2021-10-18 | End: 2021-10-18

## 2021-10-18 RX ORDER — SODIUM CHLORIDE, SODIUM LACTATE, POTASSIUM CHLORIDE, CALCIUM CHLORIDE 600; 310; 30; 20 MG/100ML; MG/100ML; MG/100ML; MG/100ML
INJECTION, SOLUTION INTRAVENOUS CONTINUOUS
Status: DISCONTINUED | OUTPATIENT
Start: 2021-10-18 | End: 2021-10-18

## 2021-10-18 RX ORDER — SODIUM CHLORIDE 9 MG/ML
INJECTION, SOLUTION INTRAVENOUS CONTINUOUS
Status: DISCONTINUED | OUTPATIENT
Start: 2021-10-18 | End: 2021-10-18

## 2021-10-18 RX ORDER — HYDROMORPHONE HYDROCHLORIDE 1 MG/ML
0.4 INJECTION, SOLUTION INTRAMUSCULAR; INTRAVENOUS; SUBCUTANEOUS EVERY 5 MIN PRN
Status: DISCONTINUED | OUTPATIENT
Start: 2021-10-18 | End: 2021-10-18

## 2021-10-18 RX ORDER — HYDROCODONE BITARTRATE AND ACETAMINOPHEN 5; 325 MG/1; MG/1
1 TABLET ORAL AS NEEDED
Status: DISCONTINUED | OUTPATIENT
Start: 2021-10-18 | End: 2021-10-18

## 2021-10-18 RX ORDER — PROCHLORPERAZINE EDISYLATE 5 MG/ML
5 INJECTION INTRAMUSCULAR; INTRAVENOUS ONCE AS NEEDED
Status: DISCONTINUED | OUTPATIENT
Start: 2021-10-18 | End: 2021-10-18

## 2021-10-18 RX ORDER — HYDROMORPHONE HYDROCHLORIDE 1 MG/ML
0.2 INJECTION, SOLUTION INTRAMUSCULAR; INTRAVENOUS; SUBCUTANEOUS EVERY 5 MIN PRN
Status: DISCONTINUED | OUTPATIENT
Start: 2021-10-18 | End: 2021-10-18

## 2021-10-18 RX ORDER — HALOPERIDOL 5 MG/ML
0.25 INJECTION INTRAMUSCULAR ONCE AS NEEDED
Status: DISCONTINUED | OUTPATIENT
Start: 2021-10-18 | End: 2021-10-18

## 2021-10-18 RX ORDER — MORPHINE SULFATE 2 MG/ML
2 INJECTION, SOLUTION INTRAMUSCULAR; INTRAVENOUS EVERY 10 MIN PRN
Status: DISCONTINUED | OUTPATIENT
Start: 2021-10-18 | End: 2021-10-18

## 2021-10-18 RX ORDER — NALOXONE HYDROCHLORIDE 0.4 MG/ML
80 INJECTION, SOLUTION INTRAMUSCULAR; INTRAVENOUS; SUBCUTANEOUS AS NEEDED
Status: DISCONTINUED | OUTPATIENT
Start: 2021-10-18 | End: 2021-10-18

## 2021-10-18 RX ORDER — ONDANSETRON 2 MG/ML
4 INJECTION INTRAMUSCULAR; INTRAVENOUS ONCE AS NEEDED
Status: DISCONTINUED | OUTPATIENT
Start: 2021-10-18 | End: 2021-10-18

## 2021-10-18 RX ORDER — METOPROLOL TARTRATE 50 MG/1
25 TABLET, FILM COATED ORAL
Qty: 90 TABLET | Refills: 1 | Status: SHIPPED | COMMUNITY
Start: 2021-10-18

## 2021-10-18 RX ADMIN — SODIUM CHLORIDE: 9 INJECTION, SOLUTION INTRAVENOUS at 15:45:00

## 2021-10-18 RX ADMIN — EPHEDRINE SULFATE 10 MG: 50 INJECTION INTRAVENOUS at 15:32:00

## 2021-10-18 RX ADMIN — EPHEDRINE SULFATE 10 MG: 50 INJECTION INTRAVENOUS at 15:33:00

## 2021-10-18 RX ADMIN — LIDOCAINE HYDROCHLORIDE 50 MG: 10 INJECTION, SOLUTION EPIDURAL; INFILTRATION; INTRACAUDAL; PERINEURAL at 15:30:00

## 2021-10-18 RX ADMIN — SODIUM CHLORIDE: 9 INJECTION, SOLUTION INTRAVENOUS at 15:26:00

## 2021-10-18 NOTE — ANESTHESIA PREPROCEDURE EVALUATION
Anesthesia PreOp Note    HPI:     Shweta Feliciano is a 77year old male who presents for preoperative consultation requested by: * No surgeons listed *    Date of Surgery: 10/18/2021    * No procedures listed *  Indication: * No pre-op diagnosis entered * Coronary artery disease 2000    Per N coronary stents   • Disorder of thyroid    • Essential hypertension    • Extrinsic asthma, unspecified    • H/O hernia repair    • Hernia, inguinal, bilateral    • Herniated disc    • Herniated disc    • High blood daily., Disp: 30 tablet, Rfl: 0, 10/17/2021 at Unknown time  Vitamin D3, Cholecalciferol, 25 MCG Oral Tab, Take 2 tablets (2,000 Units total) by mouth daily. , Disp: 30 tablet, Rfl: 0, 10/17/2021 at Unknown time  Multiple Vitamins-Minerals (Nicho Owens removal   • Skin cancer Maternal Grandfather    • Prostate Cancer Paternal Grandfather    • Heart Disease Other         PEr NG: Family history of CAD   • Skin cancer Maternal Grandmother    • Cancer Maternal Grandmother    • Cancer Sister         unknown c Frequency of Communication with Friends and Family: Not on file      Frequency of Social Gatherings with Friends and Family: Not on file      Attends [employer] Services: Not on file      Active Member of Clubs or Organizations: Not on file      Attends Clu (+) hypertension, CAD,     Rhythm: irregular  Rate: normal  ROS comment: Sob with exertion.  Denies chest pain    Neuro/Psych      GI/Hepatic/Renal      Endo/Other    Abdominal                Anesthesia Plan:   ASA:  3  Plan:   MAC  Informed Consent Plan

## 2021-10-18 NOTE — PROCEDURES
[facility]    Cardiac Electrophysiology Procedure Note    Freddie HOOD [facility] Lab Suites   [facility] [id number] MRN [id number]   Admission Date 10/18/2021 Procedure Date 10/18/2021   Attending Physician Yaneli Peñaloza MD Procedure Physicia

## 2021-10-18 NOTE — ANESTHESIA POSTPROCEDURE EVALUATION
Patient: Viola Horvath    Procedure Summary     Date: 10/18/21 Room / Location: [facility] Interventional Suites    Anesthesia Start: 7347 Anesthesia Stop:     Procedure: EP CARDIOVERSION 1X Diagnosis:       Atrial fibrillation (HCC)      Atrial fib

## 2021-11-18 RX ORDER — CARBAMAZEPINE 400 MG/1
400 TABLET, EXTENDED RELEASE ORAL 3 TIMES DAILY
Qty: 270 TABLET | Refills: 1 | Status: SHIPPED | OUTPATIENT
Start: 2021-11-18

## 2021-12-27 RX ORDER — RIVAROXABAN 20 MG/1
20 TABLET, FILM COATED ORAL DAILY
Qty: 30 TABLET | Refills: 5 | Status: SHIPPED | OUTPATIENT
Start: 2021-12-27

## 2022-01-18 RX ORDER — LEVOTHYROXINE SODIUM 0.1 MG/1
TABLET ORAL
Qty: 90 TABLET | Refills: 1 | Status: SHIPPED | OUTPATIENT
Start: 2022-01-18

## 2022-02-01 ENCOUNTER — LAB ENCOUNTER (OUTPATIENT)
Dept: LAB | Age: 77
End: 2022-02-01
Attending: INTERNAL MEDICINE
Payer: MEDICARE

## 2022-02-01 DIAGNOSIS — I42.0 PRIMARY DILATED CARDIOMYOPATHY (HCC): ICD-10-CM

## 2022-02-01 DIAGNOSIS — I48.91 A-FIB (HCC): Primary | ICD-10-CM

## 2022-02-01 LAB
BUN BLD-MCNC: 13 MG/DL (ref 7–18)
BUN/CREAT SERPL: 16 (ref 10–20)
CALCIUM BLD-MCNC: 9.9 MG/DL (ref 8.5–10.1)
CHLORIDE SERPL-SCNC: 107 MMOL/L (ref 98–112)
CO2 SERPL-SCNC: 29 MMOL/L (ref 21–32)
CREAT BLD-MCNC: 0.81 MG/DL
FASTING STATUS PATIENT QL REPORTED: NO
GLUCOSE BLD-MCNC: 112 MG/DL (ref 70–99)
OSMOLALITY SERPL CALC.SUM OF ELEC: 291 MOSM/KG (ref 275–295)
POTASSIUM SERPL-SCNC: 4.2 MMOL/L (ref 3.5–5.1)
SODIUM SERPL-SCNC: 140 MMOL/L (ref 136–145)

## 2022-02-01 PROCEDURE — 36415 COLL VENOUS BLD VENIPUNCTURE: CPT

## 2022-02-01 PROCEDURE — 80048 BASIC METABOLIC PNL TOTAL CA: CPT

## 2022-04-20 RX ORDER — RIVAROXABAN 20 MG/1
TABLET, FILM COATED ORAL
Qty: 90 TABLET | Refills: 0 | OUTPATIENT
Start: 2022-04-20

## 2022-04-20 NOTE — TELEPHONE ENCOUNTER
Please review; protocol failed/no protocol    Requested Prescriptions   Pending Prescriptions Disp Refills    XARELTO 20 MG Oral Tab [Pharmacy Med Name: Sherry Man 20MG TABLETS] 90 tablet 0     Sig: TAKE 1 TABLET(20 MG) BY MOUTH DAILY        There is no refill protocol information for this order        Powell Man 20 MG Oral Tab [Pharmacy Med Name: Sherry Villatoro 20MG TABLETS] 90 tablet 0     Sig: TAKE 1 TABLET(20 MG) BY MOUTH DAILY        There is no refill protocol information for this order            Recent Outpatient Visits              6 months ago Preop testing    [facility]    Nurse Only    6 months ago Pre-op testing    [facility]    Nurse Only    6 months ago Pre-op testing    [facility]    Nurse Only    7 months ago Benign prostatic hyperplasia with urinary frequency    [facility] Marky Mitchell MD    Office Visit    8 months ago Atrial fibrillation with rapid ventricular response [facility])    [facility],     Telemedicine

## 2022-04-21 RX ORDER — RIVAROXABAN 20 MG/1
20 TABLET, FILM COATED ORAL DAILY
Qty: 90 TABLET | Refills: 1 | Status: SHIPPED | OUTPATIENT
Start: 2022-04-21

## 2022-05-17 RX ORDER — CARBAMAZEPINE 400 MG/1
TABLET, EXTENDED RELEASE ORAL
Qty: 270 TABLET | Refills: 1 | Status: SHIPPED | OUTPATIENT
Start: 2022-05-17

## 2022-06-14 NOTE — H&P
[facility]    [facility]iac [facility]iology [facility]   [facility] [id number] MRN [id number]   Admission Date 10/6/2021 Procedure Date 10/6/2021   Attending Physician Le Weldon MD Procedure Physician Monty
independent

## 2022-06-30 ENCOUNTER — OFFICE VISIT (OUTPATIENT)
Dept: DERMATOLOGY CLINIC | Facility: CLINIC | Age: 77
End: 2022-06-30
Payer: MEDICARE

## 2022-06-30 DIAGNOSIS — D48.5 NEOPLASM OF UNCERTAIN BEHAVIOR OF SKIN: Primary | ICD-10-CM

## 2022-06-30 PROCEDURE — 88305 TISSUE EXAM BY PATHOLOGIST: CPT | Performed by: PHYSICIAN ASSISTANT

## 2022-06-30 RX ORDER — MELATONIN
1 DAILY
COMMUNITY
Start: 2022-06-08

## 2022-06-30 RX ORDER — LOSARTAN POTASSIUM 25 MG/1
25 TABLET ORAL DAILY
COMMUNITY
Start: 2022-04-07

## 2022-06-30 NOTE — PROCEDURES
Procedure: Shave biopsy     With the patient is a supine position, the skin was scrubbed with alcohol. Anesthesia was obtained by injecting 2 mL of 1% Xylocaine with Epinephrine. The skin surrounding the lesion on the neck on the left side was placed under tension and the lesion was incised using a #15 scalpel blade. The specimen was sent for histopathological examination. Hemostasis was obtained with cautery. The biopsy site was dressed with bandaid and petroleum jelly. The estimated blood loss was < 1mL. Clinical Dx & Size of lesion(s):    Lesion 1 - Hemangioma vs mole - size: 5 mm    Freddie tolerated the procedure well.   Complications:  none

## 2022-07-08 ENCOUNTER — APPOINTMENT (OUTPATIENT)
Dept: CARDIOLOGY | Age: 77
End: 2022-07-08

## 2022-07-11 RX ORDER — LEVOTHYROXINE SODIUM 0.1 MG/1
TABLET ORAL
Qty: 90 TABLET | Refills: 1 | Status: SHIPPED | OUTPATIENT
Start: 2022-07-11 | End: 2023-01-03

## 2022-07-22 ENCOUNTER — SOCIAL WORK SERVICES (OUTPATIENT)
Dept: HEMATOLOGY/ONCOLOGY | Facility: HOSPITAL | Age: 77
End: 2022-07-22

## 2022-07-22 NOTE — PROGRESS NOTES
SW assisted patient's with POA paperwork for him and his spouse when she came in for first treatment. Copies scanned in EMR. Social Work will remain available for assessment, education and discharge planning as needed.

## 2022-08-02 ENCOUNTER — OFFICE VISIT (OUTPATIENT)
Dept: OTOLARYNGOLOGY | Facility: CLINIC | Age: 77
End: 2022-08-02
Payer: MEDICARE

## 2022-08-02 VITALS — BODY MASS INDEX: 32.49 KG/M2 | WEIGHT: 207 LBS | HEIGHT: 67 IN

## 2022-08-02 DIAGNOSIS — C44.40 SKIN CANCER OF SCALP OR SKIN OF NECK: Primary | ICD-10-CM

## 2022-08-02 PROCEDURE — 99203 OFFICE O/P NEW LOW 30 MIN: CPT | Performed by: OTOLARYNGOLOGY

## 2022-08-02 PROCEDURE — 1126F AMNT PAIN NOTED NONE PRSNT: CPT | Performed by: OTOLARYNGOLOGY

## 2022-08-07 ENCOUNTER — HOSPITAL ENCOUNTER (EMERGENCY)
Facility: HOSPITAL | Age: 77
Discharge: HOME | End: 2022-08-07
Attending: STUDENT IN AN ORGANIZED HEALTH CARE EDUCATION/TRAINING PROGRAM
Payer: MEDICARE

## 2022-08-07 VITALS
OXYGEN SATURATION: 94 % | HEART RATE: 88 BPM | SYSTOLIC BLOOD PRESSURE: 121 MMHG | DIASTOLIC BLOOD PRESSURE: 99 MMHG | TEMPERATURE: 97 F | RESPIRATION RATE: 17 BRPM

## 2022-08-07 DIAGNOSIS — I48.91 ATRIAL FIBRILLATION, UNSPECIFIED TYPE (HCC): Primary | ICD-10-CM

## 2022-08-07 LAB
ANION GAP SERPL CALC-SCNC: 5 MMOL/L (ref 0–18)
BASOPHILS # BLD AUTO: 0.03 X10(3) UL (ref 0–0.2)
BASOPHILS NFR BLD AUTO: 0.5 %
BUN BLD-MCNC: 16 MG/DL (ref 7–18)
BUN/CREAT SERPL: 19.5 (ref 10–20)
CALCIUM BLD-MCNC: 8.8 MG/DL (ref 8.5–10.1)
CHLORIDE SERPL-SCNC: 109 MMOL/L (ref 98–112)
CO2 SERPL-SCNC: 23 MMOL/L (ref 21–32)
CREAT BLD-MCNC: 0.82 MG/DL
DEPRECATED RDW RBC AUTO: 49.8 FL (ref 35.1–46.3)
EOSINOPHIL # BLD AUTO: 0.15 X10(3) UL (ref 0–0.7)
EOSINOPHIL NFR BLD AUTO: 2.7 %
ERYTHROCYTE [DISTWIDTH] IN BLOOD BY AUTOMATED COUNT: 14.6 % (ref 11–15)
GFR SERPLBLD BASED ON 1.73 SQ M-ARVRAT: 92 ML/MIN/1.73M2 (ref 60–?)
GLUCOSE BLD-MCNC: 126 MG/DL (ref 70–99)
HCT VFR BLD AUTO: 42.7 %
HGB BLD-MCNC: 13.7 G/DL
IMM GRANULOCYTES # BLD AUTO: 0.02 X10(3) UL (ref 0–1)
IMM GRANULOCYTES NFR BLD: 0.4 %
LYMPHOCYTES # BLD AUTO: 0.67 X10(3) UL (ref 1–4)
LYMPHOCYTES NFR BLD AUTO: 11.9 %
MAGNESIUM SERPL-MCNC: 1.9 MG/DL (ref 1.6–2.6)
MCH RBC QN AUTO: 29.5 PG (ref 26–34)
MCHC RBC AUTO-ENTMCNC: 32.1 G/DL (ref 31–37)
MCV RBC AUTO: 92 FL
MONOCYTES # BLD AUTO: 0.53 X10(3) UL (ref 0.1–1)
MONOCYTES NFR BLD AUTO: 9.4 %
NEUTROPHILS # BLD AUTO: 4.24 X10 (3) UL (ref 1.5–7.7)
NEUTROPHILS # BLD AUTO: 4.24 X10(3) UL (ref 1.5–7.7)
NEUTROPHILS NFR BLD AUTO: 75.1 %
OSMOLALITY SERPL CALC.SUM OF ELEC: 287 MOSM/KG (ref 275–295)
PLATELET # BLD AUTO: 158 10(3)UL (ref 150–450)
POTASSIUM SERPL-SCNC: 4.4 MMOL/L (ref 3.5–5.1)
RBC # BLD AUTO: 4.64 X10(6)UL
SODIUM SERPL-SCNC: 137 MMOL/L (ref 136–145)
TROPONIN I HIGH SENSITIVITY: 33 NG/L
WBC # BLD AUTO: 5.6 X10(3) UL (ref 4–11)

## 2022-08-07 PROCEDURE — 99285 EMERGENCY DEPT VISIT HI MDM: CPT

## 2022-08-07 PROCEDURE — 99284 EMERGENCY DEPT VISIT MOD MDM: CPT

## 2022-08-07 PROCEDURE — 83735 ASSAY OF MAGNESIUM: CPT | Performed by: STUDENT IN AN ORGANIZED HEALTH CARE EDUCATION/TRAINING PROGRAM

## 2022-08-07 PROCEDURE — 93010 ELECTROCARDIOGRAM REPORT: CPT | Performed by: STUDENT IN AN ORGANIZED HEALTH CARE EDUCATION/TRAINING PROGRAM

## 2022-08-07 PROCEDURE — 80048 BASIC METABOLIC PNL TOTAL CA: CPT | Performed by: STUDENT IN AN ORGANIZED HEALTH CARE EDUCATION/TRAINING PROGRAM

## 2022-08-07 PROCEDURE — 84484 ASSAY OF TROPONIN QUANT: CPT | Performed by: STUDENT IN AN ORGANIZED HEALTH CARE EDUCATION/TRAINING PROGRAM

## 2022-08-07 PROCEDURE — 36415 COLL VENOUS BLD VENIPUNCTURE: CPT

## 2022-08-07 PROCEDURE — 93005 ELECTROCARDIOGRAM TRACING: CPT

## 2022-08-07 PROCEDURE — 85025 COMPLETE CBC W/AUTO DIFF WBC: CPT | Performed by: STUDENT IN AN ORGANIZED HEALTH CARE EDUCATION/TRAINING PROGRAM

## 2022-08-07 NOTE — ED INITIAL ASSESSMENT (HPI)
Pt arrives to ED with c/o feeling like heart is racing since last night. Pt has hx a-fib, is on xarelto  Pt HR fluctuating between 80s - low 100s.

## 2022-08-10 ENCOUNTER — LAB REQUISITION (OUTPATIENT)
Dept: LAB | Facility: HOSPITAL | Age: 77
End: 2022-08-10
Payer: MEDICARE

## 2022-08-10 ENCOUNTER — TELEPHONE (OUTPATIENT)
Dept: OTOLARYNGOLOGY | Facility: CLINIC | Age: 77
End: 2022-08-10

## 2022-08-10 DIAGNOSIS — C44.40 UNSPECIFIED MALIGNANT NEOPLASM OF SKIN OF SCALP AND NECK: ICD-10-CM

## 2022-08-10 PROCEDURE — 88332 PATH CONSLTJ SURG EA ADD BLK: CPT | Performed by: OTOLARYNGOLOGY

## 2022-08-10 PROCEDURE — 88305 TISSUE EXAM BY PATHOLOGIST: CPT | Performed by: OTOLARYNGOLOGY

## 2022-08-10 PROCEDURE — 88331 PATH CONSLTJ SURG 1 BLK 1SPC: CPT | Performed by: OTOLARYNGOLOGY

## 2022-08-10 RX ORDER — CEPHALEXIN 500 MG/1
500 CAPSULE ORAL EVERY 8 HOURS
Qty: 21 CAPSULE | Refills: 0 | Status: SHIPPED | OUTPATIENT
Start: 2022-08-10

## 2022-08-11 ENCOUNTER — TELEPHONE (OUTPATIENT)
Dept: OTOLARYNGOLOGY | Facility: CLINIC | Age: 77
End: 2022-08-11

## 2022-08-11 NOTE — TELEPHONE ENCOUNTER
POD 1 excision and closure of left neck. Patient is feeling well, no pain, taking antibiotic. Aware to contact our office with any fever, drainage, swelling. Appt scheduled for 8/18/22.

## 2022-08-18 ENCOUNTER — OFFICE VISIT (OUTPATIENT)
Dept: OTOLARYNGOLOGY | Facility: CLINIC | Age: 77
End: 2022-08-18
Payer: MEDICARE

## 2022-08-18 VITALS — HEIGHT: 67 IN | WEIGHT: 207 LBS | BODY MASS INDEX: 32.49 KG/M2

## 2022-08-18 DIAGNOSIS — C44.40 SKIN CANCER OF SCALP OR SKIN OF NECK: Primary | ICD-10-CM

## 2022-08-18 PROCEDURE — 1126F AMNT PAIN NOTED NONE PRSNT: CPT | Performed by: OTOLARYNGOLOGY

## 2022-08-18 PROCEDURE — 99024 POSTOP FOLLOW-UP VISIT: CPT | Performed by: OTOLARYNGOLOGY

## 2022-08-19 ENCOUNTER — LAB ENCOUNTER (OUTPATIENT)
Dept: LAB | Age: 77
End: 2022-08-19
Attending: INTERNAL MEDICINE
Payer: MEDICARE

## 2022-08-19 DIAGNOSIS — I50.22 CHRONIC SYSTOLIC HEART FAILURE (HCC): Primary | ICD-10-CM

## 2022-08-19 DIAGNOSIS — E78.00 PURE HYPERCHOLESTEROLEMIA: ICD-10-CM

## 2022-08-19 LAB
ALBUMIN SERPL-MCNC: 3.8 G/DL (ref 3.4–5)
ALBUMIN/GLOB SERPL: 1.3 {RATIO} (ref 1–2)
ALP LIVER SERPL-CCNC: 98 U/L
ALT SERPL-CCNC: 34 U/L
ANION GAP SERPL CALC-SCNC: 6 MMOL/L (ref 0–18)
AST SERPL-CCNC: 19 U/L (ref 15–37)
BILIRUB SERPL-MCNC: 0.4 MG/DL (ref 0.1–2)
BUN BLD-MCNC: 15 MG/DL (ref 7–18)
BUN/CREAT SERPL: 17.4 (ref 10–20)
CALCIUM BLD-MCNC: 10.1 MG/DL (ref 8.5–10.1)
CHLORIDE SERPL-SCNC: 105 MMOL/L (ref 98–112)
CHOLEST SERPL-MCNC: 166 MG/DL (ref ?–200)
CO2 SERPL-SCNC: 29 MMOL/L (ref 21–32)
CREAT BLD-MCNC: 0.86 MG/DL
FASTING PATIENT LIPID ANSWER: YES
FASTING STATUS PATIENT QL REPORTED: YES
GFR SERPLBLD BASED ON 1.73 SQ M-ARVRAT: 90 ML/MIN/1.73M2 (ref 60–?)
GLOBULIN PLAS-MCNC: 2.9 G/DL (ref 2.8–4.4)
GLUCOSE BLD-MCNC: 108 MG/DL (ref 70–99)
HDLC SERPL-MCNC: 77 MG/DL (ref 40–59)
LDLC SERPL CALC-MCNC: 77 MG/DL (ref ?–100)
NONHDLC SERPL-MCNC: 89 MG/DL (ref ?–130)
OSMOLALITY SERPL CALC.SUM OF ELEC: 291 MOSM/KG (ref 275–295)
POTASSIUM SERPL-SCNC: 4.8 MMOL/L (ref 3.5–5.1)
PROT SERPL-MCNC: 6.7 G/DL (ref 6.4–8.2)
SODIUM SERPL-SCNC: 140 MMOL/L (ref 136–145)
TRIGL SERPL-MCNC: 60 MG/DL (ref 30–149)
VLDLC SERPL CALC-MCNC: 9 MG/DL (ref 0–30)

## 2022-08-19 PROCEDURE — 80061 LIPID PANEL: CPT

## 2022-08-19 PROCEDURE — 36415 COLL VENOUS BLD VENIPUNCTURE: CPT

## 2022-08-19 PROCEDURE — 80053 COMPREHEN METABOLIC PANEL: CPT

## 2022-09-21 ENCOUNTER — OFFICE VISIT (OUTPATIENT)
Dept: INTERNAL MEDICINE CLINIC | Facility: CLINIC | Age: 77
End: 2022-09-21

## 2022-09-21 VITALS
HEART RATE: 50 BPM | SYSTOLIC BLOOD PRESSURE: 120 MMHG | TEMPERATURE: 97 F | OXYGEN SATURATION: 96 % | DIASTOLIC BLOOD PRESSURE: 80 MMHG | BODY MASS INDEX: 31.91 KG/M2 | WEIGHT: 203.31 LBS | HEIGHT: 67 IN

## 2022-09-21 DIAGNOSIS — I25.10 CORONARY ARTERY DISEASE INVOLVING NATIVE CORONARY ARTERY OF NATIVE HEART WITHOUT ANGINA PECTORIS: ICD-10-CM

## 2022-09-21 DIAGNOSIS — I10 ESSENTIAL HYPERTENSION, BENIGN: ICD-10-CM

## 2022-09-21 DIAGNOSIS — Z86.39 HISTORY OF THYROID NODULE: ICD-10-CM

## 2022-09-21 DIAGNOSIS — N40.1 BENIGN PROSTATIC HYPERPLASIA WITH LOWER URINARY TRACT SYMPTOMS, SYMPTOM DETAILS UNSPECIFIED: ICD-10-CM

## 2022-09-21 DIAGNOSIS — I48.20 CHRONIC ATRIAL FIBRILLATION (HCC): ICD-10-CM

## 2022-09-21 DIAGNOSIS — E03.9 ACQUIRED HYPOTHYROIDISM: ICD-10-CM

## 2022-09-21 DIAGNOSIS — I50.22 CHRONIC SYSTOLIC HEART FAILURE (HCC): ICD-10-CM

## 2022-09-21 DIAGNOSIS — E78.00 PURE HYPERCHOLESTEROLEMIA: ICD-10-CM

## 2022-09-21 DIAGNOSIS — Z12.5 PROSTATE CANCER SCREENING: ICD-10-CM

## 2022-09-21 DIAGNOSIS — G50.0 TRIGEMINAL NEURALGIA: ICD-10-CM

## 2022-09-21 DIAGNOSIS — Z00.00 MEDICARE ANNUAL WELLNESS VISIT, SUBSEQUENT: Primary | ICD-10-CM

## 2022-09-21 PROBLEM — I47.2 NSVT (NONSUSTAINED VENTRICULAR TACHYCARDIA): Status: RESOLVED | Noted: 2021-08-04 | Resolved: 2022-09-21

## 2022-09-21 PROBLEM — I47.29 NSVT (NONSUSTAINED VENTRICULAR TACHYCARDIA) (HCC): Status: RESOLVED | Noted: 2021-08-04 | Resolved: 2022-09-21

## 2022-09-21 PROBLEM — U07.1 PNEUMONIA DUE TO COVID-19 VIRUS: Chronic | Status: RESOLVED | Noted: 2021-08-17 | Resolved: 2022-09-21

## 2022-09-21 PROBLEM — Z01.818 PRE-OP TESTING: Status: RESOLVED | Noted: 2021-10-07 | Resolved: 2022-09-21

## 2022-09-21 PROBLEM — I48.91 ATRIAL FIBRILLATION WITH RAPID VENTRICULAR RESPONSE (HCC): Status: RESOLVED | Noted: 2021-08-04 | Resolved: 2022-09-21

## 2022-09-21 PROBLEM — J12.82 PNEUMONIA DUE TO COVID-19 VIRUS: Chronic | Status: RESOLVED | Noted: 2021-08-17 | Resolved: 2022-09-21

## 2022-09-21 PROBLEM — I47.29 NSVT (NONSUSTAINED VENTRICULAR TACHYCARDIA): Status: RESOLVED | Noted: 2021-08-04 | Resolved: 2022-09-21

## 2022-09-21 PROCEDURE — G0439 PPPS, SUBSEQ VISIT: HCPCS | Performed by: INTERNAL MEDICINE

## 2022-09-21 PROCEDURE — 1126F AMNT PAIN NOTED NONE PRSNT: CPT | Performed by: INTERNAL MEDICINE

## 2022-10-06 ENCOUNTER — OFFICE VISIT (OUTPATIENT)
Dept: DERMATOLOGY CLINIC | Facility: CLINIC | Age: 77
End: 2022-10-06
Payer: MEDICARE

## 2022-10-06 DIAGNOSIS — L82.1 SK (SEBORRHEIC KERATOSIS): ICD-10-CM

## 2022-10-06 DIAGNOSIS — D23.9 BENIGN NEOPLASM OF SKIN, UNSPECIFIED LOCATION: ICD-10-CM

## 2022-10-06 DIAGNOSIS — L82.1 SEBORRHEIC KERATOSES: ICD-10-CM

## 2022-10-06 DIAGNOSIS — D48.5 NEOPLASM OF UNCERTAIN BEHAVIOR OF SKIN: Primary | ICD-10-CM

## 2022-10-06 DIAGNOSIS — B07.9 VERRUCA: ICD-10-CM

## 2022-10-06 DIAGNOSIS — D22.9 MULTIPLE NEVI: ICD-10-CM

## 2022-10-06 DIAGNOSIS — L81.4 LENTIGO: ICD-10-CM

## 2022-10-06 PROCEDURE — 1126F AMNT PAIN NOTED NONE PRSNT: CPT | Performed by: DERMATOLOGY

## 2022-10-06 PROCEDURE — 11102 TANGNTL BX SKIN SINGLE LES: CPT | Performed by: DERMATOLOGY

## 2022-10-06 PROCEDURE — 88305 TISSUE EXAM BY PATHOLOGIST: CPT | Performed by: DERMATOLOGY

## 2022-10-06 PROCEDURE — 99213 OFFICE O/P EST LOW 20 MIN: CPT | Performed by: DERMATOLOGY

## 2022-10-10 ENCOUNTER — TELEPHONE (OUTPATIENT)
Dept: DERMATOLOGY CLINIC | Facility: CLINIC | Age: 77
End: 2022-10-10

## 2022-10-10 NOTE — TELEPHONE ENCOUNTER
Dr. Fredo Briones pt did see his test results via mychart - he is leaving for [facility] tomorrow and wont be back until beginning of Nov - is there anything we could tell him before he leaves?

## 2022-10-11 NOTE — PROGRESS NOTES
Logged in path book and pmh. Pt informed of pathology results and KMT's recommendations for f/u. Pt scheduled for 11/19 to have lesion reassessed. Pt will schedule upper skin exam at that time.

## 2022-10-11 NOTE — PROGRESS NOTES
Spoke with pt,  informed of results. In this area, [address] [address] to address when he returns from [place]. will re-check area end of Nov or early Dec when he returns, possible e&c. May not need any additional treatment. Please add to history and check off in book.  Plan f/u  upper body exam in 3-4 mos

## 2022-10-11 NOTE — TELEPHONE ENCOUNTER
Please see test results. Do not anticipate this needs additional treatment, possible E&c, will plan to re-check when he returns from [place].   Will be there working on repairs on his home post [place]

## 2022-10-19 NOTE — PROGRESS NOTES
Operative Report                     Shave/  Tangential biopsy     Clinical diagnosis:    Size of lesion:    Location:pt with history of skin cancer with pearly papule noted on exam  Spec 1 Description >>>>>: left chest  Spec 1 Comment: r/o BCC pearly papule 8mm    Procedure: With patient in appropriate position the skin of the above was scrubbed with alcohol. Anesthesia was obtained with 1% Xylocaine with epinephrine. The skin surrounding the lesion was placed under tension and the lesion was incised using a #15 scalpel blade. The specimen was sent for histopathologic exam.    Hemostasis was obtained with electrocautery/aluminum chloride. Estimated blood loss less than 2 cc. Biopsy dressed with Polysporin, bandage. Pressure dressing:   No    Complications: None    Written instructions given and reviewed with patient    Await pathology    Contact information reviewed.     Procedural physician:  Sunday Dumont MD

## 2022-12-15 ENCOUNTER — OFFICE VISIT (OUTPATIENT)
Dept: OTOLARYNGOLOGY | Facility: CLINIC | Age: 77
End: 2022-12-15
Payer: MEDICARE

## 2022-12-15 DIAGNOSIS — C44.40 SKIN CANCER OF SCALP OR SKIN OF NECK: Primary | ICD-10-CM

## 2022-12-15 PROCEDURE — 99212 OFFICE O/P EST SF 10 MIN: CPT | Performed by: OTOLARYNGOLOGY

## 2023-01-03 DIAGNOSIS — Z86.39 HISTORY OF THYROID NODULE: Primary | ICD-10-CM

## 2023-01-03 DIAGNOSIS — E03.9 ACQUIRED HYPOTHYROIDISM: ICD-10-CM

## 2023-01-03 RX ORDER — LEVOTHYROXINE SODIUM 0.1 MG/1
100 TABLET ORAL DAILY
Qty: 90 TABLET | Refills: 0 | Status: SHIPPED | OUTPATIENT
Start: 2023-01-03

## 2023-01-04 ENCOUNTER — LAB ENCOUNTER (OUTPATIENT)
Dept: LAB | Age: 78
End: 2023-01-04
Attending: INTERNAL MEDICINE
Payer: MEDICARE

## 2023-01-04 DIAGNOSIS — E03.9 ACQUIRED HYPOTHYROIDISM: ICD-10-CM

## 2023-01-04 DIAGNOSIS — Z12.5 PROSTATE CANCER SCREENING: ICD-10-CM

## 2023-01-04 LAB
COMPLEXED PSA SERPL-MCNC: 4.56 NG/ML (ref ?–4)
TSI SER-ACNC: 2.96 MIU/ML (ref 0.36–3.74)

## 2023-01-04 PROCEDURE — 84443 ASSAY THYROID STIM HORMONE: CPT

## 2023-01-04 PROCEDURE — 36415 COLL VENOUS BLD VENIPUNCTURE: CPT

## 2023-02-01 ENCOUNTER — OFFICE VISIT (OUTPATIENT)
Dept: DERMATOLOGY CLINIC | Facility: CLINIC | Age: 78
End: 2023-02-01

## 2023-02-01 DIAGNOSIS — L81.4 LENTIGO: ICD-10-CM

## 2023-02-01 DIAGNOSIS — D22.9 MULTIPLE NEVI: ICD-10-CM

## 2023-02-01 DIAGNOSIS — L82.1 SEBORRHEIC KERATOSES: ICD-10-CM

## 2023-02-01 DIAGNOSIS — D23.9 BENIGN NEOPLASM OF SKIN, UNSPECIFIED LOCATION: ICD-10-CM

## 2023-02-01 DIAGNOSIS — L57.0 AK (ACTINIC KERATOSIS): Primary | ICD-10-CM

## 2023-04-12 DIAGNOSIS — E03.9 ACQUIRED HYPOTHYROIDISM: ICD-10-CM

## 2023-04-12 RX ORDER — LEVOTHYROXINE SODIUM 0.1 MG/1
100 TABLET ORAL DAILY
Qty: 90 TABLET | Refills: 3 | Status: SHIPPED | OUTPATIENT
Start: 2023-04-12

## 2023-04-12 NOTE — TELEPHONE ENCOUNTER
Refill passed per [place] [facility] protocol. Requested Prescriptions   Pending Prescriptions Disp Refills    levothyroxine 100 MCG Oral Tab 90 tablet 3     Sig: Take 1 tablet (100 mcg total) by mouth daily.        Thyroid Medication Protocol Passed - 2023 10:24 AM        Passed - TSH in past 12 months        Passed - Last TSH value is normal     Lab Results   Component Value Date    TSH 2.960 2023                 Passed - In person appointment or virtual visit in the past 12 mos or appointment in next 3 mos     Recent Outpatient Visits              2 months ago AK (actinic keratosis)    [facility] [place] Claudio Suarez MD    Office Visit    3 months ago Skin cancer of scalp or skin of neck    [facility], [address], Gil Sky MD    Office Visit    6 months ago Neoplasm of uncertain behavior of skin    [facility], Krissy Roque, Reza Ba MD    Office Visit    6 months ago Medicare annual wellness visit, subsequent    [address] [facility], Apolinar Guo MD    Office Visit    7 months ago Skin cancer of scalp or skin of neck    [facility], [address], Gil Sky MD    Office Visit          Future Appointments         Provider Department Appt Notes    In 1 week Wanda Bull MD [facility], [address], [employer]     In 3 months Claudio Suarez MD [facility], [address], [employer]     In 4 months [facility], [address], [address] [facility]

## 2023-04-13 NOTE — LETTER
[place] [address] [place] [id number] [phone]                11/18/19      Olga Brunner    Dear Clyde Smith,    I reviewed the pathology report from the polyps Simponi Pregnancy And Lactation Text: The risk during pregnancy and breastfeeding is uncertain with this medication.

## 2023-04-25 ENCOUNTER — HOSPITAL ENCOUNTER (OUTPATIENT)
Dept: GENERAL RADIOLOGY | Facility: HOSPITAL | Age: 78
Discharge: HOME | End: 2023-04-25
Attending: INTERNAL MEDICINE
Payer: MEDICARE

## 2023-04-25 ENCOUNTER — OFFICE VISIT (OUTPATIENT)
Dept: SURGERY | Facility: CLINIC | Age: 78
End: 2023-04-25

## 2023-04-25 ENCOUNTER — LAB ENCOUNTER (OUTPATIENT)
Dept: LAB | Facility: HOSPITAL | Age: 78
End: 2023-04-25
Attending: INTERNAL MEDICINE
Payer: MEDICARE

## 2023-04-25 VITALS — HEART RATE: 111 BPM | SYSTOLIC BLOOD PRESSURE: 133 MMHG | DIASTOLIC BLOOD PRESSURE: 93 MMHG

## 2023-04-25 DIAGNOSIS — N52.01 ERECTILE DYSFUNCTION DUE TO ARTERIAL INSUFFICIENCY: ICD-10-CM

## 2023-04-25 DIAGNOSIS — R52 PAIN: ICD-10-CM

## 2023-04-25 DIAGNOSIS — I10 ESSENTIAL HYPERTENSION, MALIGNANT: ICD-10-CM

## 2023-04-25 DIAGNOSIS — R97.20 ELEVATED PSA: Primary | ICD-10-CM

## 2023-04-25 DIAGNOSIS — E78.00 PURE HYPERCHOLESTEROLEMIA: ICD-10-CM

## 2023-04-25 DIAGNOSIS — Z01.89 ENCOUNTER FOR CARDIOVERSION PROCEDURE: Primary | ICD-10-CM

## 2023-04-25 DIAGNOSIS — N40.1 BENIGN PROSTATIC HYPERPLASIA WITH URINARY FREQUENCY: ICD-10-CM

## 2023-04-25 DIAGNOSIS — R35.0 BENIGN PROSTATIC HYPERPLASIA WITH URINARY FREQUENCY: ICD-10-CM

## 2023-04-25 LAB
ANION GAP SERPL CALC-SCNC: 8 MMOL/L (ref 0–18)
BASOPHILS # BLD AUTO: 0.04 X10(3) UL (ref 0–0.2)
BASOPHILS NFR BLD AUTO: 0.7 %
BUN BLD-MCNC: 14 MG/DL (ref 7–18)
BUN/CREAT SERPL: 17.1 (ref 10–20)
CALCIUM BLD-MCNC: 9.8 MG/DL (ref 8.5–10.1)
CHLORIDE SERPL-SCNC: 107 MMOL/L (ref 98–112)
CHOLEST SERPL-MCNC: 156 MG/DL (ref ?–200)
CO2 SERPL-SCNC: 28 MMOL/L (ref 21–32)
CREAT BLD-MCNC: 0.82 MG/DL
DEPRECATED RDW RBC AUTO: 49.3 FL (ref 35.1–46.3)
EOSINOPHIL # BLD AUTO: 0.16 X10(3) UL (ref 0–0.7)
EOSINOPHIL NFR BLD AUTO: 2.8 %
ERYTHROCYTE [DISTWIDTH] IN BLOOD BY AUTOMATED COUNT: 14.4 % (ref 11–15)
FASTING PATIENT LIPID ANSWER: YES
FASTING STATUS PATIENT QL REPORTED: YES
GFR SERPLBLD BASED ON 1.73 SQ M-ARVRAT: 91 ML/MIN/1.73M2 (ref 60–?)
GLUCOSE BLD-MCNC: 102 MG/DL (ref 70–99)
HCT VFR BLD AUTO: 42.3 %
HDLC SERPL-MCNC: 77 MG/DL (ref 40–59)
HGB BLD-MCNC: 13.9 G/DL
IMM GRANULOCYTES # BLD AUTO: 0.01 X10(3) UL (ref 0–1)
IMM GRANULOCYTES NFR BLD: 0.2 %
LDLC SERPL CALC-MCNC: 66 MG/DL (ref ?–100)
LYMPHOCYTES # BLD AUTO: 0.75 X10(3) UL (ref 1–4)
LYMPHOCYTES NFR BLD AUTO: 13.1 %
MCH RBC QN AUTO: 30.7 PG (ref 26–34)
MCHC RBC AUTO-ENTMCNC: 32.9 G/DL (ref 31–37)
MCV RBC AUTO: 93.4 FL
MONOCYTES # BLD AUTO: 0.62 X10(3) UL (ref 0.1–1)
MONOCYTES NFR BLD AUTO: 10.8 %
NEUTROPHILS # BLD AUTO: 4.15 X10 (3) UL (ref 1.5–7.7)
NEUTROPHILS # BLD AUTO: 4.15 X10(3) UL (ref 1.5–7.7)
NEUTROPHILS NFR BLD AUTO: 72.4 %
NONHDLC SERPL-MCNC: 79 MG/DL (ref ?–130)
OSMOLALITY SERPL CALC.SUM OF ELEC: 297 MOSM/KG (ref 275–295)
PLATELET # BLD AUTO: 176 10(3)UL (ref 150–450)
POTASSIUM SERPL-SCNC: 4.3 MMOL/L (ref 3.5–5.1)
RBC # BLD AUTO: 4.53 X10(6)UL
SODIUM SERPL-SCNC: 143 MMOL/L (ref 136–145)
TRIGL SERPL-MCNC: 66 MG/DL (ref 30–149)
VLDLC SERPL CALC-MCNC: 10 MG/DL (ref 0–30)
WBC # BLD AUTO: 5.7 X10(3) UL (ref 4–11)

## 2023-04-25 PROCEDURE — 80061 LIPID PANEL: CPT

## 2023-04-25 PROCEDURE — 99214 OFFICE O/P EST MOD 30 MIN: CPT | Performed by: UROLOGY

## 2023-04-25 PROCEDURE — 80048 BASIC METABOLIC PNL TOTAL CA: CPT

## 2023-04-25 PROCEDURE — 85025 COMPLETE CBC W/AUTO DIFF WBC: CPT

## 2023-04-25 PROCEDURE — 71046 X-RAY EXAM CHEST 2 VIEWS: CPT | Performed by: INTERNAL MEDICINE

## 2023-04-25 PROCEDURE — 36415 COLL VENOUS BLD VENIPUNCTURE: CPT

## 2023-04-25 NOTE — H&P
The above referenced H&P was reviewed by Sheila Amin MD on 4/27/2023, the patient was examined and no significant changes have occurred in the patient's condition since the H&P was performed. Risks and benefits were discussed, proceed with procedure as planned.

## 2023-04-27 ENCOUNTER — HOSPITAL ENCOUNTER (OUTPATIENT)
Dept: INTERVENTIONAL RADIOLOGY/VASCULAR | Facility: HOSPITAL | Age: 78
Discharge: HOME | End: 2023-04-27
Attending: INTERNAL MEDICINE | Admitting: INTERNAL MEDICINE
Payer: MEDICARE

## 2023-04-27 VITALS
DIASTOLIC BLOOD PRESSURE: 73 MMHG | TEMPERATURE: 98 F | HEIGHT: 67 IN | HEART RATE: 49 BPM | RESPIRATION RATE: 14 BRPM | BODY MASS INDEX: 30.61 KG/M2 | OXYGEN SATURATION: 97 % | SYSTOLIC BLOOD PRESSURE: 103 MMHG | WEIGHT: 195 LBS

## 2023-04-27 DIAGNOSIS — R93.1 ABNORMAL ECHOCARDIOGRAM: ICD-10-CM

## 2023-04-27 DIAGNOSIS — I42.0 CARDIOMYOPATHY, DILATED (HCC): ICD-10-CM

## 2023-04-27 DIAGNOSIS — I50.22 CHRONIC SYSTOLIC CHF (CONGESTIVE HEART FAILURE) (HCC): ICD-10-CM

## 2023-04-27 LAB
ATRIAL RATE: 43 BPM
INR BLD: 1.03 (ref 0.85–1.16)
P AXIS: 87 DEGREES
P-R INTERVAL: 200 MS
PROTHROMBIN TIME: 13.4 SECONDS (ref 11.6–14.8)
Q-T INTERVAL: 478 MS
QRS DURATION: 118 MS
QTC CALCULATION (BEZET): 403 MS
R AXIS: -20 DEGREES
T AXIS: 24 DEGREES
VENTRICULAR RATE: 43 BPM

## 2023-04-27 PROCEDURE — 36415 COLL VENOUS BLD VENIPUNCTURE: CPT

## 2023-04-27 PROCEDURE — 93010 ELECTROCARDIOGRAM REPORT: CPT | Performed by: INTERNAL MEDICINE

## 2023-04-27 PROCEDURE — 93459 L HRT ART/GRFT ANGIO: CPT | Performed by: INTERNAL MEDICINE

## 2023-04-27 PROCEDURE — 4A023N7 MEASUREMENT OF CARDIAC SAMPLING AND PRESSURE, LEFT HEART, PERCUTANEOUS APPROACH: ICD-10-PCS | Performed by: INTERNAL MEDICINE

## 2023-04-27 PROCEDURE — 99152 MOD SED SAME PHYS/QHP 5/>YRS: CPT | Performed by: INTERNAL MEDICINE

## 2023-04-27 PROCEDURE — 93571 IV DOP VEL&/PRESS C FLO 1ST: CPT | Performed by: INTERNAL MEDICINE

## 2023-04-27 PROCEDURE — B2121ZZ FLUOROSCOPY OF SINGLE CORONARY ARTERY BYPASS GRAFT USING LOW OSMOLAR CONTRAST: ICD-10-PCS | Performed by: INTERNAL MEDICINE

## 2023-04-27 PROCEDURE — 93005 ELECTROCARDIOGRAM TRACING: CPT

## 2023-04-27 PROCEDURE — 99153 MOD SED SAME PHYS/QHP EA: CPT | Performed by: INTERNAL MEDICINE

## 2023-04-27 PROCEDURE — B2111ZZ FLUOROSCOPY OF MULTIPLE CORONARY ARTERIES USING LOW OSMOLAR CONTRAST: ICD-10-PCS | Performed by: INTERNAL MEDICINE

## 2023-04-27 PROCEDURE — 4A033BC MEASUREMENT OF ARTERIAL PRESSURE, CORONARY, PERCUTANEOUS APPROACH: ICD-10-PCS | Performed by: INTERNAL MEDICINE

## 2023-04-27 PROCEDURE — 85610 PROTHROMBIN TIME: CPT | Performed by: INTERNAL MEDICINE

## 2023-04-27 PROCEDURE — 93572 IV DOP VEL&/PRESS C FLO EA: CPT | Performed by: INTERNAL MEDICINE

## 2023-04-27 RX ORDER — SODIUM CHLORIDE 9 MG/ML
INJECTION, SOLUTION INTRAVENOUS
Status: COMPLETED | OUTPATIENT
Start: 2023-04-27 | End: 2023-04-27

## 2023-04-27 RX ORDER — MIDAZOLAM HYDROCHLORIDE 1 MG/ML
INJECTION INTRAMUSCULAR; INTRAVENOUS
Status: COMPLETED
Start: 2023-04-27 | End: 2023-04-27

## 2023-04-27 RX ORDER — HEPARIN SODIUM 1000 [USP'U]/ML
INJECTION, SOLUTION INTRAVENOUS; SUBCUTANEOUS
Status: COMPLETED
Start: 2023-04-27 | End: 2023-04-27

## 2023-04-27 RX ORDER — LIDOCAINE HYDROCHLORIDE 20 MG/ML
INJECTION, SOLUTION EPIDURAL; INFILTRATION; INTRACAUDAL; PERINEURAL
Status: COMPLETED
Start: 2023-04-27 | End: 2023-04-27

## 2023-04-27 RX ORDER — CARVEDILOL 3.12 MG/1
3.12 TABLET ORAL 2 TIMES DAILY WITH MEALS
Qty: 60 TABLET | Refills: 5 | Status: SHIPPED | OUTPATIENT
Start: 2023-04-27

## 2023-04-27 RX ORDER — RIVAROXABAN 20 MG/1
20 TABLET, FILM COATED ORAL DAILY
Qty: 90 TABLET | Refills: 1 | Status: SHIPPED | COMMUNITY
Start: 2023-04-28

## 2023-04-27 RX ADMIN — SODIUM CHLORIDE: 9 INJECTION, SOLUTION INTRAVENOUS at 08:15:00

## 2023-04-27 NOTE — IVS NOTE
DISCHARGE NOTE     Pt is able to sit up and ambulate without difficulty. Pt voided and tolerated fluids and food. Right groin procedural site remains dry and intact with good circulation, motion, and sensation. No signs and symptoms of bleeding/hematoma noted. Pt denies any pain or discomfort at this time. IV access removed  Instruction provided, patient/family verbalizes understanding. Dr. Robert Nayak spoke with patient/family post procedure.      Pt discharge via wheelchair to [address]     Follow up Appointment: Thursday, May 4th at 10:40AM with Dr. Breana Olivia Prescription: coreg

## 2023-05-08 ENCOUNTER — NURSE ONLY (OUTPATIENT)
Dept: LAB | Age: 78
End: 2023-05-08
Attending: INTERNAL MEDICINE
Payer: MEDICARE

## 2023-05-08 DIAGNOSIS — Z01.818 PRE-OP TESTING: ICD-10-CM

## 2023-05-08 LAB
ALBUMIN SERPL-MCNC: 3.6 G/DL (ref 3.4–5)
ALBUMIN/GLOB SERPL: 1.1 {RATIO} (ref 1–2)
ALP LIVER SERPL-CCNC: 106 U/L
ALT SERPL-CCNC: 29 U/L
ANION GAP SERPL CALC-SCNC: 6 MMOL/L (ref 0–18)
AST SERPL-CCNC: 18 U/L (ref 15–37)
BILIRUB SERPL-MCNC: 0.3 MG/DL (ref 0.1–2)
BUN BLD-MCNC: 16 MG/DL (ref 7–18)
BUN/CREAT SERPL: 18.2 (ref 10–20)
CALCIUM BLD-MCNC: 9.8 MG/DL (ref 8.5–10.1)
CHLORIDE SERPL-SCNC: 109 MMOL/L (ref 98–112)
CO2 SERPL-SCNC: 28 MMOL/L (ref 21–32)
CREAT BLD-MCNC: 0.88 MG/DL
FASTING STATUS PATIENT QL REPORTED: NO
GFR SERPLBLD BASED ON 1.73 SQ M-ARVRAT: 89 ML/MIN/1.73M2 (ref 60–?)
GLOBULIN PLAS-MCNC: 3.2 G/DL (ref 2.8–4.4)
GLUCOSE BLD-MCNC: 95 MG/DL (ref 70–99)
OSMOLALITY SERPL CALC.SUM OF ELEC: 297 MOSM/KG (ref 275–295)
POTASSIUM SERPL-SCNC: 4.6 MMOL/L (ref 3.5–5.1)
PROT SERPL-MCNC: 6.8 G/DL (ref 6.4–8.2)
SODIUM SERPL-SCNC: 143 MMOL/L (ref 136–145)

## 2023-05-08 PROCEDURE — 87641 MR-STAPH DNA AMP PROBE: CPT

## 2023-05-08 PROCEDURE — 80053 COMPREHEN METABOLIC PANEL: CPT

## 2023-05-08 PROCEDURE — 36415 COLL VENOUS BLD VENIPUNCTURE: CPT

## 2023-05-08 NOTE — H&P
[facility]    Cardiac Electrophysiology H&P [address] [facility] [address] Lab Suites   [facility] [id number] MRN [id number]   Admission Date 5/12/2023 Procedure Date 5/12/2023   Attending Physician Cortney Ayala MD Procedure Physician Cassidy Ricks MD       H&P ADDENDUM    I personally reviewed the attached H&P on 5/12/2023, and no significant changes have occurred in the patient's condition since the H&P was performed. He queried benefits of left sided vs right sided device generator, and we discussed this and he agrees with a left chest device. Risks, alternatives, and benefits of procedure were reviewed with the patient. He is ready to proceed. Cassidy Ricks M.D.    Cardiac Electrophysiology       -----------------------------------------

## 2023-05-09 LAB — MRSA DNA SPEC QL NAA+PROBE: NEGATIVE

## 2023-05-12 ENCOUNTER — APPOINTMENT (OUTPATIENT)
Dept: GENERAL RADIOLOGY | Facility: HOSPITAL | Age: 78
End: 2023-05-12
Attending: INTERNAL MEDICINE
Payer: MEDICARE

## 2023-05-12 ENCOUNTER — HOSPITAL ENCOUNTER (OUTPATIENT)
Dept: INTERVENTIONAL RADIOLOGY/VASCULAR | Facility: HOSPITAL | Age: 78
Discharge: HOME | End: 2023-05-12
Attending: INTERNAL MEDICINE | Admitting: INTERNAL MEDICINE
Payer: MEDICARE

## 2023-05-12 VITALS
OXYGEN SATURATION: 96 % | WEIGHT: 194 LBS | HEART RATE: 70 BPM | HEIGHT: 67 IN | TEMPERATURE: 97 F | RESPIRATION RATE: 23 BRPM | DIASTOLIC BLOOD PRESSURE: 80 MMHG | BODY MASS INDEX: 30.45 KG/M2 | SYSTOLIC BLOOD PRESSURE: 106 MMHG

## 2023-05-12 DIAGNOSIS — I50.9 CHF (CONGESTIVE HEART FAILURE) (HCC): ICD-10-CM

## 2023-05-12 DIAGNOSIS — I42.9 CARDIOMYOPATHY (HCC): ICD-10-CM

## 2023-05-12 DIAGNOSIS — I48.19 PERSISTENT ATRIAL FIBRILLATION (HCC): ICD-10-CM

## 2023-05-12 DIAGNOSIS — Z01.818 PRE-OP TESTING: Primary | ICD-10-CM

## 2023-05-12 PROCEDURE — 71045 X-RAY EXAM CHEST 1 VIEW: CPT | Performed by: INTERNAL MEDICINE

## 2023-05-12 PROCEDURE — 02H63KZ INSERTION OF DEFIBRILLATOR LEAD INTO RIGHT ATRIUM, PERCUTANEOUS APPROACH: ICD-10-PCS | Performed by: INTERNAL MEDICINE

## 2023-05-12 PROCEDURE — 02HK3KZ INSERTION OF DEFIBRILLATOR LEAD INTO RIGHT VENTRICLE, PERCUTANEOUS APPROACH: ICD-10-PCS | Performed by: INTERNAL MEDICINE

## 2023-05-12 PROCEDURE — 99153 MOD SED SAME PHYS/QHP EA: CPT | Performed by: INTERNAL MEDICINE

## 2023-05-12 PROCEDURE — 0JH609Z INSERTION OF CARDIAC RESYNCHRONIZATION DEFIBRILLATOR PULSE GENERATOR INTO CHEST SUBCUTANEOUS TISSUE AND FASCIA, OPEN APPROACH: ICD-10-PCS | Performed by: INTERNAL MEDICINE

## 2023-05-12 PROCEDURE — 33225 L VENTRIC PACING LEAD ADD-ON: CPT | Performed by: INTERNAL MEDICINE

## 2023-05-12 PROCEDURE — 36415 COLL VENOUS BLD VENIPUNCTURE: CPT

## 2023-05-12 PROCEDURE — 33249 INSJ/RPLCMT DEFIB W/LEAD(S): CPT | Performed by: INTERNAL MEDICINE

## 2023-05-12 PROCEDURE — 02583ZZ DESTRUCTION OF CONDUCTION MECHANISM, PERCUTANEOUS APPROACH: ICD-10-PCS | Performed by: INTERNAL MEDICINE

## 2023-05-12 PROCEDURE — 99152 MOD SED SAME PHYS/QHP 5/>YRS: CPT | Performed by: INTERNAL MEDICINE

## 2023-05-12 PROCEDURE — 02H43KZ INSERTION OF DEFIBRILLATOR LEAD INTO CORONARY VEIN, PERCUTANEOUS APPROACH: ICD-10-PCS | Performed by: INTERNAL MEDICINE

## 2023-05-12 PROCEDURE — 93650 ICAR CATH ABLTJ AV NODE FUNC: CPT | Performed by: INTERNAL MEDICINE

## 2023-05-12 RX ORDER — ACETAMINOPHEN 325 MG/1
650 TABLET ORAL EVERY 4 HOURS PRN
Status: DISCONTINUED | OUTPATIENT
Start: 2023-05-12 | End: 2023-05-12

## 2023-05-12 RX ORDER — RIVAROXABAN 20 MG/1
20 TABLET, FILM COATED ORAL DAILY
Qty: 90 TABLET | Refills: 1 | Status: SHIPPED | COMMUNITY
Start: 2023-05-12

## 2023-05-12 RX ORDER — CEPHALEXIN 500 MG/1
500 CAPSULE ORAL 4 TIMES DAILY
Qty: 4 CAPSULE | Refills: 0 | Status: SHIPPED | OUTPATIENT
Start: 2023-05-12 | End: 2023-05-13

## 2023-05-12 RX ORDER — CEFAZOLIN SODIUM/WATER 2 G/20 ML
SYRINGE (ML) INTRAVENOUS
Status: COMPLETED
Start: 2023-05-12 | End: 2023-05-12

## 2023-05-12 RX ORDER — ACETAMINOPHEN AND CODEINE PHOSPHATE 300; 30 MG/1; MG/1
2 TABLET ORAL EVERY 4 HOURS PRN
Status: DISCONTINUED | OUTPATIENT
Start: 2023-05-12 | End: 2023-05-12

## 2023-05-12 RX ORDER — SODIUM CHLORIDE 9 MG/ML
INJECTION, SOLUTION INTRAVENOUS CONTINUOUS
Status: DISCONTINUED | OUTPATIENT
Start: 2023-05-12 | End: 2023-05-12

## 2023-05-12 RX ORDER — MIDAZOLAM HYDROCHLORIDE 1 MG/ML
INJECTION INTRAMUSCULAR; INTRAVENOUS
Status: COMPLETED
Start: 2023-05-12 | End: 2023-05-12

## 2023-05-12 RX ORDER — ACETAMINOPHEN AND CODEINE PHOSPHATE 300; 30 MG/1; MG/1
1 TABLET ORAL EVERY 4 HOURS PRN
Status: DISCONTINUED | OUTPATIENT
Start: 2023-05-12 | End: 2023-05-12

## 2023-05-12 RX ORDER — CHLORHEXIDINE GLUCONATE 4 G/100ML
30 SOLUTION TOPICAL
Status: COMPLETED | OUTPATIENT
Start: 2023-05-12 | End: 2023-05-12

## 2023-05-12 RX ORDER — LIDOCAINE HYDROCHLORIDE 20 MG/ML
INJECTION, SOLUTION INFILTRATION; PERINEURAL
Status: COMPLETED
Start: 2023-05-12 | End: 2023-05-12

## 2023-05-12 RX ORDER — ACETAMINOPHEN AND CODEINE PHOSPHATE 300; 30 MG/1; MG/1
TABLET ORAL
Status: COMPLETED
Start: 2023-05-12 | End: 2023-05-12

## 2023-05-12 RX ADMIN — CHLORHEXIDINE GLUCONATE 30 ML: 4 SOLUTION TOPICAL at 07:00:00

## 2023-05-12 RX ADMIN — SODIUM CHLORIDE: 9 INJECTION, SOLUTION INTRAVENOUS at 07:00:00

## 2023-05-12 RX ADMIN — ACETAMINOPHEN AND CODEINE PHOSPHATE 1 TABLET: 300; 30 TABLET ORAL at 14:15:00

## 2023-05-12 NOTE — DISCHARGE INSTRUCTIONS
[facility] Discharge Instructions Pacemaker / ICD Generator     1. Your appointment for a wound check is on May 19. 2023 with the Device Nurse at 9 AM.    2.  At that appointment, they will make an appointment with Dr. Inge Cogan in 4 weeks to remove the Zipline dressing, and also an appointment with the [address] [place] [address] for 3 months after implantation of your device. 3. Please call [phone] if any questions about the follow-up appointments. 4. Remove top dressing in 24 hours and leave zip line dressing open to air. Do not get the incision site wet. 5.You may shower in 5 days. Blot the incision area gently with a towel. The Zipline dressing will remain in place until your office visit in 4 weeks. 6. For surgical site pain, you may take extra strength tylenol (500mg), 1 or 2 tabs/caps every 4-6 hours as needed. Do not exceed maximum of 6 tabs/caps in 24 hours. 7. No Strenuous exercises until cleared by your physician. Do no lift anything greater than 5-8 pounds for 1 month. 8. You may NOT reach or stretch your LEFT arm for 1 month. Keep your elbow below the level of the shoulder and you may only raise your elbow to shoulder level. 9. Wear your sling for 24 hours, then nightly for 4 weeks. 10. No repeated arm movements such as swimming for 3 months. 11. Carry your ID Card at all times. 12. Screening mammography SHOULD NOT BE DONE until 6 months after device implant. Please consult with your ordering physician if diagnostic mammography is needed prior to 6 months from device implant. 15. Inform your doctors, dentists, and emergency personnel that you have a pacemaker or ICD. 14. Do not drive until cleared by your physician. 15. No alcohol, working, or critical decision making today. 16. Oral antibiotics will need to be taken for 24 hours. Oral antibiotics x24 hours (script sent)    17.  Restart Xarelto on 5/14 at 6 PM    Notify your doctor if:    You have shortness of breath or persistent cough  Chest pain  Persistent pain on the site   Fever (temperature greater than 101F) chills, infection (redness, swelling, or yellow drainage to the site)

## 2023-05-12 NOTE — IVS NOTE
DISCHARGE NOTE     Pt is able to sit up and ambulate without difficulty. Pt voided and tolerated fluids and food. Procedural site remains dry and intact with no signs and symptoms of bleeding/hematoma noted. Left arm sling in place. Pressure dressing removed and noted scanty serosanguinous drainage, outlined. IV access removed  Instruction provided, patient/family verbalizes understanding. Dr. Nettie Bueno spoke with patient/family post procedure.      Pt discharge via wheelchair to [address] [place] [address] B     Follow up Appointment: as scheduled    New Prescription: Keflex ready for

## 2023-05-12 NOTE — OPERATIVE REPORT
[facility]    Cardiac Electrophysiology Operative Note    Elora March Location:Cath Lab Suites   [facility] [id number] MRN [id number]   Admission Date 5/12/2023 Procedure Date 5/12/2023   Attending Physician Martinez Toledo MD Procedure Physician Lesly Kaur MD       Preprocedure Diagnosis: Chronic systolic congestive heart failure NYHA Class 2, Atrial fibrillation refractory to medical therapy     Postprocedure Diagnosis: Chronic systolic congestive heart failure NYHA Class 2, Atrial fibrillation refractory to medical therapy     Procedures:  1) Implantation of a MRI-conditional biventricular implantable cardioverter- defibrillator (Chace Karvonen), right ventricular ICD lead, and left ventricular lead  2) Fluoroscopy  3) Venography  4) Radiofrequency ablation of the AV node  5) Sedation     : Lesly Kaur M.D. Anesthesia: I provided moderate conscious sedation from 08:27 to 09:45. Versed, fentanyl. Estimated blood loss: 10 mL     Complications: None apparent. Findings: The patient was brought to the operating room in the postabsorptive and stable state. A procedural pause was performed to confirm the patient's identity and the site and type of surgery. Sedation was administered. The chest was prepped and draped in a sterile fashion. 1% lidocaine was administered in the left chest, and an incision was made medial to the deltopectoral groove. Electrocautery was used to create a small inferomedially directed prepectoral pocket. The left axillary vein was accessed twice using venography, fluoroscopy, and the modified Seldinger technique, with 2 J-tipped guide wires advanced into the inferior vena cava. Over the first wire, a 8.5-Armenian sheath was advanced into the axillary vein, and through this the right ventricular ICD lead was advanced to the right ventricular septum, confirmed in the JOSE and FRANKS fluoroscopic perspectives.  Lead stability and electrical parameters were satisfactory, and with 10 V output there was no extracardiac stimulation. The lead was secured to the pectoralis muscle using 3 separate ties of 0-ethibond suture and the collar. The next wire was used to advance a 8 Fr sheath into the axillary vein, and this was exchanged over a long wire for a long deflectable sheath, through which an 8 mm tip ablation/mapping catheter was advanced and mapped the right atrium and region of the bundle of His and AV node. The patient was in atrial fibrillation with rapid rates. The HV interval was 52 msec. The region of the AV node was identified by fluoroscopic and electrogram landmarks. Radiofrequency ablation was performed in the temperature controlled mode, with energy delivery of 70 W and temperature cutoff of 60 degrees Celcius. AV block was achieved, and after a 30 minute waiting period AV block was confirmed. The long sheath was exchanged over the wire for a short 9.5 sheath, through which the LV lead delivery system with multi-electrode EP catheter was advanced into the coronary sinus. Through the guide sheath, a CS venogram demonstrated a midlateral LV vein. A soft-tipped wire was advanced into this, with an inner catheter, and the LV lead through this with good stability and pacing threshold, without extracardiac stimulation at maximum output. The sheaths were split, and the lead secured to the pectoralis muscle using 3 separate ties of 0-ethibond suture and the collar. The generator was connected to an atrial port plug and the leads, with visual inspection and gentle tugging confirming a secure connection. The device pocket was irrigated with saline solution, and hemostasis was excellent. The device was placed within the pocket and secured medially with 1 0-Ethibond header stitch. The pocket was closed with a deep layer of 2-0 Vicryl suture, an intermediate layer of 2-0 Vicryl suture, and the subcuticular layer was approximated with a Zip Line device.   A sterile bandage placed over the site. The device was interrogated via the external , with excellent lead findings. The patient was transported to the recovery area in stable status. Device Settings:  VVIR  ppm  VT at 200 bpm, 30 sec, ATP x2, 41J x6  VF at 250 bpm, ATP/41J x8        RESULTS:  - Implantation of a MRI-conditional Bi-V ICD  - RF ablation of the AV node     PLAN:  1) Sedation recovery  2) Portable CXR  3) Remote device interrogation tomorrow  4) Oral antibiotics x24 hours (script sent)  5) Restart Eliquis on 5/14 at 6 PM  6) Incision care as directed. Keep dry for 5 days. Remove Zipline in 4 weeks. 7) Arm restrictions for 3 months, with sling at night for 1 month. 8) Follow-up in [address] for incision check in 7-10 days. Jose G Franks M.D.    Cardiac Electrophysiology     5/12/2023  -----------------------------------------

## 2023-05-12 NOTE — IVS NOTE
Patient complaint of pounding sensation on left chest, accompanied with jerky movement on left side. Pacemaker rep aware and checked patient.

## 2023-06-13 ENCOUNTER — OFFICE VISIT (OUTPATIENT)
Dept: SURGERY | Facility: CLINIC | Age: 78
End: 2023-06-13

## 2023-06-13 ENCOUNTER — LAB ENCOUNTER (OUTPATIENT)
Dept: LAB | Facility: HOSPITAL | Age: 78
End: 2023-06-13
Attending: UROLOGY
Payer: MEDICARE

## 2023-06-13 DIAGNOSIS — R97.20 ELEVATED PSA: ICD-10-CM

## 2023-06-13 DIAGNOSIS — R97.20 ELEVATED PSA: Primary | ICD-10-CM

## 2023-06-13 DIAGNOSIS — N52.01 ERECTILE DYSFUNCTION DUE TO ARTERIAL INSUFFICIENCY: ICD-10-CM

## 2023-06-13 DIAGNOSIS — N40.1 BENIGN PROSTATIC HYPERPLASIA WITH URINARY FREQUENCY: ICD-10-CM

## 2023-06-13 DIAGNOSIS — R35.0 BENIGN PROSTATIC HYPERPLASIA WITH URINARY FREQUENCY: ICD-10-CM

## 2023-06-13 LAB — PSA SERPL-MCNC: 5.02 NG/ML (ref ?–4)

## 2023-06-13 PROCEDURE — 99213 OFFICE O/P EST LOW 20 MIN: CPT | Performed by: UROLOGY

## 2023-06-13 PROCEDURE — 84153 ASSAY OF PSA TOTAL: CPT

## 2023-06-13 PROCEDURE — 36415 COLL VENOUS BLD VENIPUNCTURE: CPT

## 2023-06-14 DIAGNOSIS — R97.20 ELEVATED PSA: Primary | ICD-10-CM

## 2023-06-29 ENCOUNTER — LAB ENCOUNTER (OUTPATIENT)
Dept: LAB | Age: 78
End: 2023-06-29
Attending: UROLOGY
Payer: MEDICARE

## 2023-06-29 ENCOUNTER — TELEPHONE (OUTPATIENT)
Dept: SURGERY | Facility: CLINIC | Age: 78
End: 2023-06-29

## 2023-06-29 DIAGNOSIS — R31.9 HEMATURIA, UNSPECIFIED TYPE: ICD-10-CM

## 2023-06-29 DIAGNOSIS — R82.90 URINE ABNORMALITY: Primary | ICD-10-CM

## 2023-06-29 LAB
BILIRUB UR QL STRIP.AUTO: NEGATIVE
CLARITY UR REFRACT.AUTO: CLEAR
COLOR UR AUTO: YELLOW
GLUCOSE UR STRIP.AUTO-MCNC: NEGATIVE MG/DL
LEUKOCYTE ESTERASE UR QL STRIP.AUTO: NEGATIVE
NITRITE UR QL STRIP.AUTO: NEGATIVE
PH UR STRIP.AUTO: 5 [PH] (ref 5–8)
PROT UR STRIP.AUTO-MCNC: NEGATIVE MG/DL
RBC UR QL AUTO: NEGATIVE
SP GR UR STRIP.AUTO: 1.02 (ref 1–1.03)
UROBILINOGEN UR STRIP.AUTO-MCNC: <2 MG/DL

## 2023-06-29 PROCEDURE — 81001 URINALYSIS AUTO W/SCOPE: CPT

## 2023-08-01 ENCOUNTER — HOSPITAL ENCOUNTER (OUTPATIENT)
Dept: MRI IMAGING | Facility: HOSPITAL | Age: 78
Discharge: HOME | End: 2023-08-01
Attending: NURSE PRACTITIONER
Payer: MEDICARE

## 2023-08-01 DIAGNOSIS — R97.20 ELEVATED PSA: ICD-10-CM

## 2023-08-01 PROCEDURE — A9575 INJ GADOTERATE MEGLUMI 0.1ML: HCPCS | Performed by: NURSE PRACTITIONER

## 2023-08-01 PROCEDURE — 72197 MRI PELVIS W/O & W/DYE: CPT | Performed by: NURSE PRACTITIONER

## 2023-08-01 RX ORDER — GADOTERATE MEGLUMINE 376.9 MG/ML
20 INJECTION INTRAVENOUS
Status: COMPLETED | OUTPATIENT
Start: 2023-08-01 | End: 2023-08-01

## 2023-08-01 RX ADMIN — GADOTERATE MEGLUMINE 18 ML: 376.9 INJECTION INTRAVENOUS at 08:38:00

## 2023-08-01 NOTE — IMAGING NOTE
RECEIVED PATIENT IN 3 T WITH Ixchelsis PACEMAKER WHICH HAS BEEN REPROGRAMMED TO MRI SAFE MODE FOR MRI OF PROSTATE. MONITOR CONNECTED.     0750 MONITOR CONNECTED /90 O2 , HR 72     0800 HR 42 /76 O2 SAT 93%- SCAN IMPLEMENTED    0812 /78 O2 SAT 93%    0823 O2 94%, /82- SCAN CONTINUES    0834 HR 56 /87 O2 SAT 93%     0836 CONTRAST INJECTED    /85 O2 SAT 94%, SCAN COMPLETED.  DISCHARGE HOME FROM [facility] AFTER PACEMAKER RETURN TO PRE PROCEDURE SETTINGS

## 2023-08-02 ENCOUNTER — OFFICE VISIT (OUTPATIENT)
Dept: DERMATOLOGY CLINIC | Facility: CLINIC | Age: 78
End: 2023-08-02

## 2023-08-02 DIAGNOSIS — D23.5 BENIGN NEOPLASM OF SKIN OF TRUNK: ICD-10-CM

## 2023-08-02 DIAGNOSIS — L82.1 SEBORRHEIC KERATOSES: ICD-10-CM

## 2023-08-02 DIAGNOSIS — D23.30 BENIGN NEOPLASM OF SKIN OF FACE: ICD-10-CM

## 2023-08-02 DIAGNOSIS — L57.0 AK (ACTINIC KERATOSIS): Primary | ICD-10-CM

## 2023-08-02 DIAGNOSIS — L81.4 LENTIGO: ICD-10-CM

## 2023-08-02 DIAGNOSIS — D22.9 MULTIPLE NEVI: ICD-10-CM

## 2023-08-02 DIAGNOSIS — D23.60 BENIGN NEOPLASM OF SKIN OF UPPER LIMB, INCLUDING SHOULDER, UNSPECIFIED LATERALITY: ICD-10-CM

## 2023-08-02 DIAGNOSIS — D23.4 BENIGN NEOPLASM OF SCALP AND SKIN OF NECK: ICD-10-CM

## 2023-08-02 DIAGNOSIS — D23.70 BENIGN NEOPLASM OF SKIN OF LOWER LIMB, INCLUDING HIP, UNSPECIFIED LATERALITY: ICD-10-CM

## 2023-08-02 PROCEDURE — 99213 OFFICE O/P EST LOW 20 MIN: CPT | Performed by: DERMATOLOGY

## 2023-08-13 NOTE — PROGRESS NOTES
Niraj Coronado is a 69year old male.   HPI:     CC:  Patient presents with:  Full Skin Exam: Patient present for a Full Body Check - LOV 23 - Patient has a hx of BCC - Spot of concern on left flank x 5 months - no itch or pain        Allergies:  Cat Hair Extract and Sulfa Antibiotics    HISTORY:    Past Medical History:   Diagnosis Date    Allergic rhinitis     Atherosclerosis of coronary artery     Atrial fibrillation (HCC)     Atrial flutter ([address]     BCC (basal cell carcinoma of skin)     left side neck    BCC (basal cell carcinoma) 10/2022    Left chest    Benign prostatic hypertrophy     Closed fracture of left distal radius 2019    Colon adenoma 2019    Coronary artery disease 2000    Per N coronary stents    Disorder of thyroid     Essential hypertension     Extrinsic asthma, unspecified     H/O hernia repair     Hernia, inguinal, bilateral     Herniated disc     Herniated disc     High blood pressure     High cholesterol     Hyperlipidemia     Inguinal hernia recurrent unilateral 2013    Pulmonary hypertension ([address]     Pulmonary hypertension (HCC)     S/P ablation of atrial flutter 2019    Sleep apnea     Thyroid nodule     Per NG: surgery x2     Thyroid nodule     TN (trigeminal neuralgia)     Per NG: Anti-seizure medication    TN (trigeminal neuralgia)       Past Surgical History:   Procedure Laterality Date    ANGIOPLASTY (CORONARY)      CABG      CABG and aortic valve replacement     CARDIOVERSION      CATH ABLATION  2019    for a-flutter in [address]  2019    COLONOSCOPY N/A 2019    Procedure: COLONOSCOPY;  Surgeon: Gab Bradshaw MD;  Location: [address] [place] [address] ENDOSCOPY    EP CARDIOVERSION 1X  8/4/2021         EP CARDIOVERSION 1X  10/18/2021         EP PULMONARY VEIN/A-FIB ABLATION  10/6/2021         EXC SKIN BENIG 2.1-3CM REMAINDR BODY  08/10/2022    HERNIA SURGERY      OTHER SURGICAL HISTORY      trigeminal neuralgia surgery    OTHER SURGICAL HISTORY      partial thyroidectomy for thyroid nodule    REPLACE AORTIC VALVE W/BYP      REPR CMPL WND HEAD,FAC,HAND 2.6-7.5  08/10/2022    TONSILLECTOMY        Family History   Problem Relation Age of Onset    Prostate Cancer Father     Cancer Father     Stroke Mother         Per NG: TIA's    Other (Other) Mother         vascular dementia    Kidney Disease Maternal Grandfather         Per NG kidney removal    Skin cancer Maternal Grandfather     Prostate Cancer Paternal Grandfather     Heart Disease Other         PEr NG: Family history of CAD    Skin cancer Maternal Grandmother     Cancer Maternal Grandmother     Cancer Sister         unknown cancer    Other (Other) Sister         eczema      Social History     Socioeconomic History    Marital status:    Tobacco Use    Smoking status: Former     Packs/day: 0.25     Years: 2.00     Pack years: 0.50     Types: Cigarettes     Quit date: 10/5/1969     Years since quittin.8     Passive exposure: Past    Smokeless tobacco: Never   Vaping Use    Vaping Use: Never used   Substance and Sexual Activity    Alcohol use: Yes     Alcohol/week: 0.8 standard drinks of alcohol     Types: 1 Cans of beer per week     Comment: Occasionally    Drug use: Never   Other Topics Concern    Pt has a pacemaker Yes    Pt has a defibrillator Yes    Reaction to local anesthetic No        Current Outpatient Medications   Medication Sig Dispense Refill    XARELTO 20 MG Oral Tab Take 1 tablet (20 mg total) by mouth daily. Resume on  at night 90 tablet 1    carvedilol 3.125 MG Oral Tab Take 1 tablet (3.125 mg total) by mouth 2 (two) times daily with meals. 60 tablet 5    sacubitril-valsartan 24-26 MG Oral Tab Take 1 tablet by mouth 2 (two) times daily. carBAMazepine  MG Oral Tablet 12 Hr Take 1 tablet (400 mg total) by mouth 3 (three) times daily. 270 tablet 1    levothyroxine 100 MCG Oral Tab Take 1 tablet (100 mcg total) by mouth daily.  [address] tablet 3    Magnesium Oxide 400 (240 Mg) MG Oral Tab Take 1 tablet (400 mg total) by mouth daily. atorvastatin 80 MG Oral Tab Take 1 tablet (80 mg total) by mouth nightly. ascorbic acid 1000 MG Oral Tab Take 1 tablet (1,000 mg total) by mouth daily. 30 tablet 0    Vitamin D3, Cholecalciferol, 25 MCG Oral Tab Take 2 tablets (2,000 Units total) by mouth daily. 30 tablet 0    Multiple Vitamins-Minerals (MERLIN MULTIVITAMIN FOR MEN) Oral Tab Take 1 tablet by mouth daily. zinc sulfate 220 (50 Zn) MG Oral Cap Take 50 mg by mouth daily. Sennosides-Docusate Sodium (STOOL SOFTENER/LAXATIVE OR) Take by mouth. aspirin 81 MG Oral Chew Tab Chew by mouth nightly. Glucosamine-Chondroit-Vit C-Mn (GLUCOSAMINE 1500 COMPLEX) Oral Cap Take 1 tablet by mouth daily.  1200 mg daily        Allergies:     Cat Hair Extract            Comment:Other reaction(s): CAT HAIR STD EXTRACT  Sulfa Antibiotics           Comment:Other reaction(s): SULFA (SULFONAMIDE ANTIBIOTICS)    Past Medical History:   Diagnosis Date    Allergic rhinitis     Atherosclerosis of coronary artery     Atrial fibrillation (HCC)     Atrial flutter (HCC)     BCC (basal cell carcinoma of skin)     left side neck    BCC (basal cell carcinoma) 10/2022    Left chest    Benign prostatic hypertrophy     Closed fracture of left distal radius 2019    Colon adenoma 2019    Coronary artery disease 2000    Per N coronary stents    Disorder of thyroid     Essential hypertension     Extrinsic asthma, unspecified     H/O hernia repair     Hernia, inguinal, bilateral     Herniated disc     Herniated disc     High blood pressure     High cholesterol     Hyperlipidemia     Inguinal hernia recurrent unilateral 2013    Pulmonary hypertension ([address]     Pulmonary hypertension (HCC)     S/P ablation of atrial flutter 2019    Sleep apnea     Thyroid nodule     Per NG: surgery x2     Thyroid nodule     TN (trigeminal neuralgia)     Per NG: Anti-seizure medication    TN (trigeminal neuralgia)      Past Surgical History:   Procedure Laterality Date    ANGIOPLASTY (CORONARY)      CABG      CABG and aortic valve replacement     CARDIOVERSION      CATH ABLATION  2019    for a-flutter in [address]  2019    COLONOSCOPY N/A 2019    Procedure: COLONOSCOPY;  Surgeon: Tanner Rodriguez MD;  Location: [address] [place] [address] ENDOSCOPY    EP CARDIOVERSION 1X  8/4/2021         EP CARDIOVERSION 1X  10/18/2021         EP PULMONARY VEIN/A-FIB ABLATION  10/6/2021         EXC SKIN BENIG 2.1-3CM REMAINDR BODY  08/10/2022    HERNIA SURGERY      OTHER SURGICAL HISTORY      trigeminal neuralgia surgery    OTHER SURGICAL HISTORY      partial thyroidectomy for thyroid nodule    REPLACE AORTIC VALVE W/BYP      REPR CMPL WND HEAD,FAC,HAND 2.6-7.5  08/10/2022    TONSILLECTOMY       Social History    Socioeconomic History      Marital status:       Spouse name: Not on file      Number of children: Not on file      Years of education: Not on file      Highest education level: Not on file    Occupational History      Not on file    Tobacco Use      Smoking status: Former        Packs/day: 0.25        Years: 2.00        Pack years: .5        Types: Cigarettes        Quit date: 10/5/1969        Years since quittin.8        Passive exposure: Past      Smokeless tobacco: Never    Vaping Use      Vaping Use: Never used    Substance and Sexual Activity      Alcohol use:  Yes        Alcohol/week: 0.8 standard drinks of alcohol        Types: 1 Cans of beer per week        Comment: Occasionally      Drug use: Never      Sexual activity: Not on file    Other Topics      Concerns:        Grew up on a farm: Not Asked        History of tanning: Not Asked        Outdoor occupation: Not Asked        Pt has a pacemaker: Yes        Pt has a defibrillator: Yes        Reaction to local anesthetic: No    Social History Narrative      Not on file    Social Determinants of Health  Financial Resource Strain: Not on file  Food Insecurity: Not on file  Transportation Needs: Not on file  Physical Activity: Not on file  Stress: Not on file  Social Connections: Not on file  Housing Stability: Not on file  Family History   Problem Relation Age of Onset    Prostate Cancer Father     Cancer Father     Stroke Mother         Per NG: TIA's    Other (Other) Mother         vascular dementia    Kidney Disease Maternal Grandfather         Per NG kidney removal    Skin cancer Maternal Grandfather     Prostate Cancer Paternal Grandfather     Heart Disease Other         PEr NG: Family history of CAD    Skin cancer Maternal Grandmother     Cancer Maternal Grandmother     Cancer Sister         unknown cancer    Other (Other) Sister         eczema       There were no vitals filed for this visit. HPI:    Patient presents with:  Full Skin Exam: Patient present for a Full Body Check - LOV 02-01-23 - Patient has a hx of BCC - Spot of concern on left flank x 5 months - no itch or pain    Past notes/ records and appropriate/relevant lab results including pathology and past body maps reviewed. Updated and new information noted in current visit. Patient with history of excision left neck BCC with Dr. Russell Nowak healing well.6/22  Right neck and chest  For skin exam.  No particular lesions of concern is outdoors does use sunscreen    Patient presents with concerns above. Patient has been in their usual state of health. History, medications, allergies reviewed as noted. ROS:  Denies any other systemic complaints. No new or changeing lesions other than noted above. No fevers, chills, night sweats, unusual sun sensitivity. No other skin complaints. History, medications, allergies reviewed as noted. Physical Examination:     Well-developed well-nourished patient alert oriented in no acute distress.   Exam total-body performed, including scalp, head, neck, face,nails, hair, external eyes, including conjunctival mucosa, eyelids, lips external ears, back, chest,/ breasts, axillae,  abdomen, arms, legs, palms. Multiple light to medium brown, well marginated, uniformly pigmented, macules and papules 6 mm and less scattered on exam. pigmented lesions examined with dermoscopy benign-appearing patterns. Waxy tannish keratotic papules scattered, cherry-red vascular papules scattered. See map today's date for lesions noted . Otherwise remarkable for lesions as noted on map. See Assessment /Plan for additional history and physical exam also:    Assessment / plan:    No orders of the defined types were placed in this encounter. Meds & Refills for this Visit:  Requested Prescriptions      No prescriptions requested or ordered in this encounter         Ak (actinic keratosis)  (primary encounter diagnosis)  Seborrheic keratoses  Multiple nevi  Lentigo  Benign neoplasm of scalp and skin of neck  Benign neoplasm of skin of face  Benign neoplasm of skin of upper limb, including shoulder, unspecified laterality  Benign neoplasm of skin of trunk  Benign neoplasm of skin of lower limb, including hip, unspecified laterality    Notes lesion on left thigh that scaly reassurance regarding benign seborrheic keratosis    Left chest BCC right neck history of BCC post excision no recurrence doing well. AK's presently okay continue sunscreen sun protection helices stable. No new active AK's      Notes darker lesion on flank consistent with benign seborrheic keratosis, previous nevus noted at back stable    Multiple benign keratoses as noted. Persistent verrucous lesions. Hands stable continue topicals as needed    Pigmented lesions including macule 6 mm left plantar foot, back chest medium brown lesions. Stable, benign patterns on dermoscopy recommend observation. 5 mm medium brown papule mid left back, 3 mm right lower back as well as lesions on left chest observe.       Few scattered waxy tan-brown papules reassurance regarding benign keratoses more extensive lentigines. Waxy tan keratotic papules lesions in areas of concern as noted reassurance given. Benign nature discussed. Possibility of cryo, alphahydroxy acids over-the-counter retinol's discussed. Observe lesion at left nasal sidewall appears benign intradermal nevus versus cyst possibility of scarring discussed. Consider biopsy in future if this continues to change. Stable    No other susupicious lesions on todays  exam.      6 mm tan macule at plantar left foot consistent with benign nevus recommend observation. Other waxy tan papules over the face head neck hands no other suspicious lesions patient should have skin check over the summer. No other susupicious lesions on todays  exam.      Please refer to map for specific lesions. See additional diagnoses. Pros cons of various therapies, risks benefits discussed. Pathophysiology discussed with patient. Therapeutic options reviewed. See  Medications in grid. Instructions reviewed at length. Benign nevi, seborrheic  keratoses, cherry angiomas:  Reassurance regarding other benign skin lesions. Signs and symptoms of skin cancer, ABCDE's of melanoma discussed with patient. Sunscreen use, sun protection, self exams reviewed. Followup as noted RTC routine checkup 6 mos - one year or p.r.n. Note to patient and family: The [facility] makes medical notes like these available to patients. However, be advised this is a medical document. It is intended as iwbs-jk-sute communication and monitoring of a patient's care needs. It is written in medical language and may contain abbreviations or verbiage that are unfamiliar. It may appear blunt or direct. Medical documents are intended to carry relevant information, facts as evident and the clinical opinion of the practitioner. The patient indicates understanding of these issues and agrees to the plan.   The patient is asked to return as noted in follow-up/ above. This note was generated using Dragon voice recognition software. Please contact me regarding any confusion resulting from errors in recognition. Note to patient and family: The [facility] makes medical notes like these available to patients. However, be advised this is a medical document. It is intended as [id number] communication and monitoring of a patient's care needs. It is written in medical language and may contain abbreviations or verbiage that are unfamiliar. It may appear blunt or direct. Medical documents are intended to carry relevant information, facts as evident and the clinical opinion of the practitioner.

## 2023-08-29 NOTE — TELEPHONE ENCOUNTER
Anesthesiologist present for case.  See Anesthesia Record for details regarding sedation/anesthesia, medications, and vital signs. Protocol failed, please advise on prescription request  No Thyroid results

## 2023-08-30 RX ORDER — CARBAMAZEPINE 400 MG/1
400 TABLET, EXTENDED RELEASE ORAL 3 TIMES DAILY
Qty: 270 TABLET | Refills: 1 | Status: SHIPPED | OUTPATIENT
Start: 2023-08-30

## 2023-09-06 ENCOUNTER — OFFICE VISIT (OUTPATIENT)
Dept: INTERNAL MEDICINE CLINIC | Facility: CLINIC | Age: 78
End: 2023-09-06

## 2023-09-06 VITALS
TEMPERATURE: 96 F | SYSTOLIC BLOOD PRESSURE: 116 MMHG | DIASTOLIC BLOOD PRESSURE: 78 MMHG | WEIGHT: 194.81 LBS | OXYGEN SATURATION: 99 % | BODY MASS INDEX: 30.57 KG/M2 | HEIGHT: 67 IN | HEART RATE: 71 BPM

## 2023-09-06 DIAGNOSIS — E78.00 PURE HYPERCHOLESTEROLEMIA: ICD-10-CM

## 2023-09-06 DIAGNOSIS — I50.22 CHRONIC SYSTOLIC HEART FAILURE (HCC): ICD-10-CM

## 2023-09-06 DIAGNOSIS — I38 VALVULAR HEART DISEASE: ICD-10-CM

## 2023-09-06 DIAGNOSIS — G50.0 TRIGEMINAL NEURALGIA: ICD-10-CM

## 2023-09-06 DIAGNOSIS — E03.9 ACQUIRED HYPOTHYROIDISM: ICD-10-CM

## 2023-09-06 DIAGNOSIS — Z00.00 MEDICARE ANNUAL WELLNESS VISIT, SUBSEQUENT: Primary | ICD-10-CM

## 2023-09-06 DIAGNOSIS — N40.1 BENIGN PROSTATIC HYPERPLASIA WITH LOWER URINARY TRACT SYMPTOMS, SYMPTOM DETAILS UNSPECIFIED: ICD-10-CM

## 2023-09-06 DIAGNOSIS — I10 ESSENTIAL HYPERTENSION, BENIGN: ICD-10-CM

## 2023-09-06 DIAGNOSIS — J30.9 ALLERGIC RHINITIS, UNSPECIFIED SEASONALITY, UNSPECIFIED TRIGGER: ICD-10-CM

## 2023-09-06 DIAGNOSIS — I25.10 CORONARY ARTERY DISEASE INVOLVING NATIVE CORONARY ARTERY OF NATIVE HEART WITHOUT ANGINA PECTORIS: ICD-10-CM

## 2023-09-06 DIAGNOSIS — Z86.39 HISTORY OF THYROID NODULE: ICD-10-CM

## 2023-09-06 DIAGNOSIS — I48.20 CHRONIC ATRIAL FIBRILLATION (HCC): ICD-10-CM

## 2023-09-06 PROCEDURE — G0439 PPPS, SUBSEQ VISIT: HCPCS | Performed by: INTERNAL MEDICINE

## 2023-09-09 PROBLEM — I42.9 CARDIOMYOPATHY (HCC): Chronic | Status: RESOLVED | Noted: 2021-08-17 | Resolved: 2023-09-09

## 2023-09-28 ENCOUNTER — TELEPHONE (OUTPATIENT)
Dept: CASE MANAGEMENT | Age: 78
End: 2023-09-28

## 2023-09-28 ENCOUNTER — TELEPHONE (OUTPATIENT)
Dept: INTERNAL MEDICINE CLINIC | Facility: CLINIC | Age: 78
End: 2023-09-28

## 2023-09-28 DIAGNOSIS — G50.0 TRIGEMINAL NEURALGIA: Primary | ICD-10-CM

## 2023-09-28 NOTE — TELEPHONE ENCOUNTER
Dr. Mckenna Lew,     Patient has Medicare and is requesting the name of a neurologist you recommend for trigeminal neuralgia. Pended referral please review diagnosis and sign off if you agree. Thank you.   Jean Palma

## 2023-09-28 NOTE — TELEPHONE ENCOUNTER
Patient is requesting referral.     Name of specialist and specialty department :    Neurology  Reason for visit with the specialist:   Nerve condition diagnosis  Address of the specialist office:  Any  Appointment date:   None         CSS informed patient the turnaround time for referral is 5-7 business days. Patient was informed to check their [facility] account for referral status.

## 2023-10-27 ENCOUNTER — LAB ENCOUNTER (OUTPATIENT)
Dept: LAB | Age: 78
End: 2023-10-27
Attending: INTERNAL MEDICINE

## 2023-10-27 DIAGNOSIS — E03.9 ACQUIRED HYPOTHYROIDISM: ICD-10-CM

## 2023-10-27 DIAGNOSIS — G50.0 TRIGEMINAL NEURALGIA: ICD-10-CM

## 2023-10-27 LAB
CARBAMAZEPINE SERPL-MCNC: 8.1 UG/ML (ref 4–12)
TSI SER-ACNC: 3.24 MIU/ML (ref 0.36–3.74)

## 2023-10-27 PROCEDURE — 80156 ASSAY CARBAMAZEPINE TOTAL: CPT

## 2023-10-27 PROCEDURE — 36415 COLL VENOUS BLD VENIPUNCTURE: CPT

## 2023-10-27 PROCEDURE — 84443 ASSAY THYROID STIM HORMONE: CPT

## 2023-12-04 ENCOUNTER — OFFICE VISIT (OUTPATIENT)
Dept: NEUROLOGY | Facility: CLINIC | Age: 78
End: 2023-12-04
Payer: MEDICARE

## 2023-12-04 ENCOUNTER — LAB ENCOUNTER (OUTPATIENT)
Dept: LAB | Age: 78
End: 2023-12-04
Attending: Other
Payer: MEDICARE

## 2023-12-04 VITALS — BODY MASS INDEX: 29.82 KG/M2 | HEIGHT: 67 IN | WEIGHT: 190 LBS

## 2023-12-04 DIAGNOSIS — G50.0 TRIGEMINAL NEURALGIA: ICD-10-CM

## 2023-12-04 DIAGNOSIS — G50.0 TRIGEMINAL NEURALGIA: Primary | ICD-10-CM

## 2023-12-04 LAB
ALBUMIN SERPL-MCNC: 4.1 G/DL (ref 3.2–4.8)
ALBUMIN/GLOB SERPL: 1.8 {RATIO} (ref 1–2)
ALP LIVER SERPL-CCNC: 97 U/L
ALT SERPL-CCNC: 17 U/L
ANION GAP SERPL CALC-SCNC: 5 MMOL/L (ref 0–18)
AST SERPL-CCNC: 18 U/L (ref ?–34)
BILIRUB SERPL-MCNC: 0.4 MG/DL (ref 0.2–1.1)
BUN BLD-MCNC: 17 MG/DL (ref 9–23)
BUN/CREAT SERPL: 18.3 (ref 10–20)
CALCIUM BLD-MCNC: 9.9 MG/DL (ref 8.7–10.4)
CHLORIDE SERPL-SCNC: 108 MMOL/L (ref 98–112)
CO2 SERPL-SCNC: 30 MMOL/L (ref 21–32)
CREAT BLD-MCNC: 0.93 MG/DL
EGFRCR SERPLBLD CKD-EPI 2021: 85 ML/MIN/1.73M2 (ref 60–?)
FASTING STATUS PATIENT QL REPORTED: NO
GLOBULIN PLAS-MCNC: 2.3 G/DL (ref 2.8–4.4)
GLUCOSE BLD-MCNC: 130 MG/DL (ref 70–99)
OSMOLALITY SERPL CALC.SUM OF ELEC: 299 MOSM/KG (ref 275–295)
POTASSIUM SERPL-SCNC: 4.5 MMOL/L (ref 3.5–5.1)
PROT SERPL-MCNC: 6.4 G/DL (ref 5.7–8.2)
SODIUM SERPL-SCNC: 143 MMOL/L (ref 136–145)

## 2023-12-04 PROCEDURE — 80053 COMPREHEN METABOLIC PANEL: CPT

## 2023-12-04 PROCEDURE — 99204 OFFICE O/P NEW MOD 45 MIN: CPT | Performed by: OTHER

## 2023-12-04 PROCEDURE — 36415 COLL VENOUS BLD VENIPUNCTURE: CPT

## 2023-12-04 RX ORDER — GABAPENTIN 100 MG/1
CAPSULE ORAL
Qty: 270 CAPSULE | Refills: 5 | Status: SHIPPED | OUTPATIENT
Start: 2023-12-04

## 2023-12-04 RX ORDER — CARBAMAZEPINE 400 MG/1
400 TABLET, EXTENDED RELEASE ORAL 3 TIMES DAILY
Qty: 270 TABLET | Refills: 1 | Status: SHIPPED | OUTPATIENT
Start: 2023-12-04

## 2023-12-04 NOTE — PROGRESS NOTES
Neurology Initial Visit     Referred By: Dr. Bernabe ref. provider found    Chief Complaint:   Chief Complaint   Patient presents with    Neurologic Problem     New patient present with trigeminal neuralgia. Patient states his pain level today is at a 2. Patient states that he was dx 30 years ago and has seen neurologist in the past.        HPI:     Anna Brown is a 69year old male, who presents for history of 30 neurology. Patient has had history of tremors in the right side of the face, sharp shooting pain jaw over the face especially with chewing or talking, has had microvascular decompression in . It was helpful for few years but then pain came back, though it was much less severe and was concentrating mostly on the right I. If you have reported the child's he will trigger the pain. It is more of a nuisance. He has been on carbamazepine for many years. He has been taking 12 mg daily dose. Also in the meantime patient also was placed on Xarelto for his history of atrial fibrillation, valve replacement, some other cardiac history of.      Past Medical History:   Diagnosis Date    Allergic rhinitis     Atherosclerosis of coronary artery     Atrial fibrillation (HCC)     Atrial flutter ([address]     BCC (basal cell carcinoma of skin)     left side neck    BCC (basal cell carcinoma) 10/2022    Left chest    Benign prostatic hypertrophy     Closed fracture of left distal radius 2019    Colon adenoma 2019    Coronary artery disease 2000    Per N coronary stents    Disorder of thyroid     Essential hypertension     Extrinsic asthma, unspecified     H/O hernia repair     Hernia, inguinal, bilateral     Herniated disc     Herniated disc     High blood pressure     High cholesterol     Hyperlipidemia     Inguinal hernia recurrent unilateral 2013    Pulmonary hypertension ([address]     Pulmonary hypertension ([address]     S/P ablation of atrial flutter 2019    Sleep apnea     Thyroid nodule     Per NG: surgery x2     Thyroid nodule     TN (trigeminal neuralgia) 1985    Per NG: Anti-seizure medication    TN (trigeminal neuralgia)        Past Surgical History:   Procedure Laterality Date    ANGIOPLASTY (CORONARY)      CABG      CABG and aortic valve replacement 2014    CARDIOVERSION      CATH ABLATION  03/2019    for a-flutter in [address]  11/18/2019    COLONOSCOPY N/A 11/18/2019    Procedure: COLONOSCOPY;  Surgeon: Rika Gaspar MD;  Location: [address] [place] [address] ENDOSCOPY    EP CARDIOVERSION 1X  8/4/2021         EP CARDIOVERSION 1X  10/18/2021         EP PULMONARY VEIN/A-FIB ABLATION  10/6/2021         EXC SKIN BENIG 2.1-3CM REMAINDR BODY  08/10/2022    HERNIA SURGERY      OTHER SURGICAL HISTORY      trigeminal neuralgia surgery    OTHER SURGICAL HISTORY      partial thyroidectomy for thyroid nodule    REPLACE AORTIC VALVE W/BYP      REPR CMPL WND HEAD,FAC,HAND 2.6-7.5  08/10/2022    TONSILLECTOMY         Social history:  History   Smoking Status    Former    Packs/day: 0.25    Years: 2.00    Types: Cigarettes    Quit date: 10/5/1969   Smokeless Tobacco    Never     History   Alcohol Use    0.8 standard drinks of alcohol/week    1 Cans of beer per week     Comment: Occasionally     History   Drug Use Unknown       Family History   Problem Relation Age of Onset    Prostate Cancer Father     Cancer Father         prostate cancer    Stroke Mother         Per NG: TIA's    Other (Other) Mother         vascular dementia    Skin cancer Maternal Grandmother     Cancer Maternal Grandmother     Kidney Disease Maternal Grandfather         Per NG kidney removal    Skin cancer Maternal Grandfather     Prostate Cancer Paternal Grandfather     Cancer Sister         unknown cancer    Other (Other) Sister         eczema    Heart Disease Other         PEr NG: Family history of CAD         Current Outpatient Medications:     carBAMazepine  MG Oral Tablet 12 Hr, Take 1 tablet (400 mg total) by mouth 3 (three) times daily. , Disp: 270 tablet, Rfl: 1    gabapentin 100 MG Oral Cap, Start with 1 pill TID for 1 week, then 2 pills TID for another week, then 3 pills TID, Disp: 270 capsule, Rfl: 5    XARELTO 20 MG Oral Tab, Take 1 tablet (20 mg total) by mouth daily. Resume on 5/14 at night, Disp: 90 tablet, Rfl: 1    carvedilol 3.125 MG Oral Tab, Take 1 tablet (3.125 mg total) by mouth 2 (two) times daily with meals. , Disp: 60 tablet, Rfl: 5    sacubitril-valsartan 24-26 MG Oral Tab, Take 1 tablet by mouth 2 (two) times daily. , Disp: , Rfl:     levothyroxine 100 MCG Oral Tab, Take 1 tablet (100 mcg total) by mouth daily. , Disp: 90 tablet, Rfl: 3    Magnesium Oxide 400 (240 Mg) MG Oral Tab, Take 1 tablet (400 mg total) by mouth daily. , Disp: , Rfl:     atorvastatin 80 MG Oral Tab, Take 1 tablet (80 mg total) by mouth nightly., Disp: , Rfl:     ascorbic acid 1000 MG Oral Tab, Take 1 tablet (1,000 mg total) by mouth daily. , Disp: 30 tablet, Rfl: 0    Vitamin D3, Cholecalciferol, 25 MCG Oral Tab, Take 2 tablets (2,000 Units total) by mouth daily. , Disp: 30 tablet, Rfl: 0    Multiple Vitamins-Minerals (MERLIN MULTIVITAMIN FOR MEN) Oral Tab, Take 1 tablet by mouth daily. , Disp: , Rfl:     zinc sulfate 220 (50 Zn) MG Oral Cap, Take 50 mg by mouth daily. , Disp: , Rfl:     Sennosides-Docusate Sodium (STOOL SOFTENER/LAXATIVE OR), Take by mouth., Disp: , Rfl:     aspirin 81 MG Oral Chew Tab, Chew by mouth nightly.  , Disp: , Rfl:     Glucosamine-Chondroit-Vit C-Mn (GLUCOSAMINE 1500 COMPLEX) Oral Cap, Take 1 tablet by mouth daily.  1200 mg daily , Disp: , Rfl:     Allergies   Allergen Reactions    Cat Hair Extract      Other reaction(s): CAT HAIR STD EXTRACT    Sulfa Antibiotics      Other reaction(s): SULFA (SULFONAMIDE ANTIBIOTICS)       ROS:   As in HPI, the rest of the 14 system review was done and was negative      Physical Exam:  Vitals:    12/04/23 0736   Weight: 190 lb (86.2 kg)   Height: 67\"       General: No apparent distress, well nourished, well groomed. Head- Normocephalic, atraumatic  Eyes- No redness or swelling  ENT- Hearing intake, normal glutition  Neck- No masses or adenopathy  Cv: pulses were palpable and normal, no cyanosis or edema     Neurological:     Mental Status- Alert and oriented x3. Normal attention span and concentration  Thought process intact  Memory intact- recent and remote  Mood intact  Fund of knowledge appropriate for education and age    Language intact including: comprehension, naming, repetition, vocabulary    Cranial Nerves:  VII. Face symmetric, no facial weakness  VIII. Hearing intact to whisper. IX. Pallet elevates symmetrically. XI. Shoulder shrug is intact  XII. Tongue is midline    Motor Exam:  Muscle tone normal  No atrophy or fasciculations  Strength- upper extremities 5/5 proximally and distally                   Rapid alternating movements intact    Gait:  Normal posture  Normal physiologic      Labs:    Lab Results   Component Value Date    TSH 3.240 10/27/2023     Lab Results   Component Value Date    HDL 77 (H) 04/25/2023    LDL 66 04/25/2023    TRIG 66 04/25/2023     Lab Results   Component Value Date    HGB 13.9 04/25/2023    HCT 42.3 04/25/2023    MCV 93.4 04/25/2023    WBC 5.7 04/25/2023    .0 04/25/2023      Lab Results   Component Value Date    BUN 16 05/08/2023    CA 9.8 05/08/2023    ALT 29 05/08/2023    AST 18 05/08/2023    ALB 3.6 05/08/2023     05/08/2023    K 4.6 05/08/2023     05/08/2023    CO2 28.0 05/08/2023      I have reviewed labs. Assessment   1. Trigeminal neuralgia  Persistent tracheal neuralgia and despite having surgery over 30 years ago. Possibility of repeat intervention he will be referred for evaluation to the neurosurgeon. In the meantime we will also discuss interference between Xarelto and carbamazepine. He will discuss with his cardiologist the concerns.   If need be we can always taper off carbamazepine but patient was very reluctant to do this at this time. For now we will start with gabapentin. Slowly tapering up the dose  Discussed potential side effects.    - Neurosurgery Referral - In Network  - Comp Metabolic Panel (14) [E]; Future           Education and counseling provided to patient. Instructed patient to call my office or seek medical attention immediately if symptoms worsen. Patient verbalized understanding of information given. All questions were answered. All side effects of drugs were discussed. Return to clinic in: Return in about 4 weeks (around 1/1/2024).     Kevin Valadez MD

## 2023-12-05 ENCOUNTER — TELEPHONE (OUTPATIENT)
Dept: NEUROLOGY | Facility: CLINIC | Age: 78
End: 2023-12-05

## 2023-12-05 NOTE — TELEPHONE ENCOUNTER
----- Message from Cleavon Dubin, MD sent at 12/5/2023  6:43 AM CST -----  Please let the patient know that results of these particular lab tests so far were normal.    Thank you

## 2023-12-05 NOTE — TELEPHONE ENCOUNTER
For now he decided to continue with carbamazepine even though there is interaction between Xarelto and carbamazepine as we discussed. If he agrees we can definitely taper off carbamazepine. But he was planning to discuss it with his cardiologist.  Otherwise we will start him on gabapentin.

## 2023-12-05 NOTE — TELEPHONE ENCOUNTER
Dr. Andi Alejandro, please confirm if you would like both taken together. Both prescriptions were sent over yesterday to the pharmacy, and the patient would like you to confirm if he should be taking both. Per the OV notes of 12/4/23: If need be we can always taper off carbamazepine but patient was very reluctant to do this at this time. For now we will start with gabapentin.   Slowly tapering up the dose

## 2023-12-05 NOTE — TELEPHONE ENCOUNTER
I spoke with the patient and informed him of the information below. Pt states he spoke with his cardiologist and was informed that he may resume the carbamazepine. The patient states that he has decided NOT to start the gabapentin at this time and resume only on the carbamazepine. Informed the patient I would relay the message to Dr. Samaria Almendarez. Pt verbalized understanding. Reviewed and electronically signed by:  [address], [facility]

## 2023-12-05 NOTE — TELEPHONE ENCOUNTER
Attempted contact with the patient via phone, but there was no answer. Message has belinda left on the patients voicemail informing him that the information will be sent via [employer] and to contact the office if he has any follow up questions. Call back number was provide within the message. Reviewed and electronically signed by:  [address], [facility]

## 2024-01-23 ENCOUNTER — LAB ENCOUNTER (OUTPATIENT)
Dept: LAB | Age: 79
End: 2024-01-23
Attending: UROLOGY
Payer: MEDICARE

## 2024-01-23 DIAGNOSIS — R97.20 ELEVATED PSA: ICD-10-CM

## 2024-01-23 LAB — PSA SERPL-MCNC: 3.98 NG/ML (ref ?–4)

## 2024-01-23 PROCEDURE — 84153 ASSAY OF PSA TOTAL: CPT

## 2024-01-23 PROCEDURE — 36415 COLL VENOUS BLD VENIPUNCTURE: CPT

## 2024-02-01 ENCOUNTER — OFFICE VISIT (OUTPATIENT)
Dept: SURGERY | Facility: CLINIC | Age: 79
End: 2024-02-01
Payer: COMMERCIAL

## 2024-02-01 DIAGNOSIS — N52.01 ERECTILE DYSFUNCTION DUE TO ARTERIAL INSUFFICIENCY: ICD-10-CM

## 2024-02-01 DIAGNOSIS — R97.20 ELEVATED PSA: Primary | ICD-10-CM

## 2024-02-01 PROCEDURE — 99213 OFFICE O/P EST LOW 20 MIN: CPT | Performed by: UROLOGY

## 2024-02-01 PROCEDURE — 1159F MED LIST DOCD IN RCRD: CPT | Performed by: UROLOGY

## 2024-02-01 PROCEDURE — 1126F AMNT PAIN NOTED NONE PRSNT: CPT | Performed by: UROLOGY

## 2024-02-01 NOTE — PROGRESS NOTES
Freddie Bautista is a 78 year old male.    HPI:     Chief Complaint   Patient presents with    elevated psa     PT here for follow up visit for elevated psa. PT denies new issues or complaints.        78-year-old male in follow-up to visit 2023 for history of elevated serum PSA that peaked at 4.2023.  His PSA has since that time been stable at 3.98 2024.  Digital prostate exam previously demonstrated enlarged prostate without significant or abnormal findings.  He has been referred for a prostate MRI done 2023 showing no suspicious lesions, he denies any change in his urination symptoms bone pain weight loss gross hematuria or dysuria.  IPSS score today is 3 quality-of-life index is 1.      HISTORY:  Past Medical History:   Diagnosis Date    Allergic rhinitis     Atherosclerosis of coronary artery     Atrial fibrillation (HCC)     Atrial flutter (HCC)     BCC (basal cell carcinoma of skin)     left side neck    BCC (basal cell carcinoma) 10/2022    Left chest    Benign prostatic hypertrophy     Closed fracture of left distal radius 2019    Colon adenoma 2019    Coronary artery disease 2000    Per N coronary stents    Disorder of thyroid     Essential hypertension     Extrinsic asthma, unspecified     H/O hernia repair     Hernia, inguinal, bilateral     Herniated disc     Herniated disc     High blood pressure     High cholesterol     Hyperlipidemia     Inguinal hernia recurrent unilateral 2013    Pulmonary hypertension (HCC)     Pulmonary hypertension (HCC)     S/P ablation of atrial flutter 2019    Sleep apnea     Thyroid nodule     Per NG: surgery x2     Thyroid nodule     TN (trigeminal neuralgia)     Per NG: Anti-seizure medication    TN (trigeminal neuralgia)       Past Surgical History:   Procedure Laterality Date    ANGIOPLASTY (CORONARY)      CABG      CABG and aortic valve replacement     CARDIOVERSION      CATH ABLATION  2019     for a-flutter in [place]    COLONOSCOPY      COLONOSCOPY  2019    COLONOSCOPY N/A 2019    Procedure: COLONOSCOPY;  Surgeon: River Zuniga MD;  Location: [facility] ENDOSCOPY    EP CARDIOVERSION 1X  8/4/2021         EP CARDIOVERSION 1X  10/18/2021         EP PULMONARY VEIN/A-FIB ABLATION  10/6/2021         EXC SKIN BENIG 2.1-3CM REMAINDR BODY  08/10/2022    HERNIA SURGERY      OTHER SURGICAL HISTORY      trigeminal neuralgia surgery    OTHER SURGICAL HISTORY      partial thyroidectomy for thyroid nodule    REPLACE AORTIC VALVE W/BYP      REPR CMPL WND HEAD,FAC,HAND 2.6-7.5  08/10/2022    TONSILLECTOMY        Family History   Problem Relation Age of Onset    Prostate Cancer Father     Cancer Father         prostate cancer    Stroke Mother         Per NG: TIA's    Other (Other) Mother         vascular dementia    Skin cancer Maternal Grandmother     Cancer Maternal Grandmother     Kidney Disease Maternal Grandfather         Per NG kidney removal    Skin cancer Maternal Grandfather     Prostate Cancer Paternal Grandfather     Cancer Sister         unknown cancer    Other (Other) Sister         eczema    Heart Disease Other         PEr NG: Family history of CAD      Social History:   Social History     Socioeconomic History    Marital status:    Tobacco Use    Smoking status: Former     Packs/day: 0.25     Years: 2.00     Additional pack years: 0.00     Total pack years: 0.50     Types: Cigarettes     Quit date: 10/5/1969     Years since quittin.3     Passive exposure: Past    Smokeless tobacco: Never   Vaping Use    Vaping Use: Never used   Substance and Sexual Activity    Alcohol use: Yes     Alcohol/week: 0.8 standard drinks of alcohol     Types: 1 Cans of beer per week     Comment: Occasionally    Drug use: Never   Other Topics Concern    Pt has a pacemaker Yes    Pt has a defibrillator Yes    Reaction to local anesthetic No        Medications (Active prior to today's visit):  Current  Outpatient Medications   Medication Sig Dispense Refill    carBAMazepine  MG Oral Tablet 12 Hr Take 1 tablet (400 mg total) by mouth 3 (three) times daily. 270 tablet 1    gabapentin 100 MG Oral Cap Start with 1 pill TID for 1 week, then 2 pills TID for another week, then 3 pills  capsule 5    XARELTO 20 MG Oral Tab Take 1 tablet (20 mg total) by mouth daily. Resume on 5/14 at night 90 tablet 1    carvedilol 3.125 MG Oral Tab Take 1 tablet (3.125 mg total) by mouth 2 (two) times daily with meals. 60 tablet 5    sacubitril-valsartan 24-26 MG Oral Tab Take 1 tablet by mouth 2 (two) times daily.      levothyroxine 100 MCG Oral Tab Take 1 tablet (100 mcg total) by mouth daily. 90 tablet 3    Magnesium Oxide 400 (240 Mg) MG Oral Tab Take 1 tablet (400 mg total) by mouth daily.      atorvastatin 80 MG Oral Tab Take 1 tablet (80 mg total) by mouth nightly.      ascorbic acid 1000 MG Oral Tab Take 1 tablet (1,000 mg total) by mouth daily. 30 tablet 0    Vitamin D3, Cholecalciferol, 25 MCG Oral Tab Take 2 tablets (2,000 Units total) by mouth daily. 30 tablet 0    Multiple Vitamins-Minerals (MERLIN MULTIVITAMIN FOR MEN) Oral Tab Take 1 tablet by mouth daily.      zinc sulfate 220 (50 Zn) MG Oral Cap Take 50 mg by mouth daily.      Sennosides-Docusate Sodium (STOOL SOFTENER/LAXATIVE OR) Take by mouth.      aspirin 81 MG Oral Chew Tab Chew by mouth nightly.        Glucosamine-Chondroit-Vit C-Mn (GLUCOSAMINE 1500 COMPLEX) Oral Cap Take 1 tablet by mouth daily. 1200 mg daily          Allergies:  Allergies   Allergen Reactions    Cat Hair Extract      Other reaction(s): CAT HAIR STD EXTRACT    Sulfa Antibiotics      Other reaction(s): SULFA (SULFONAMIDE ANTIBIOTICS)         ROS:       PHYSICAL EXAM:        ASSESSMENT/PLAN:   Assessment   Encounter Diagnoses   Name Primary?    Elevated PSA Yes    Erectile dysfunction due to arterial insufficiency        Recommend:  - Follow-up in 1 year.  PSA and digital prostate exam at  that time.         Orders This Visit:  No orders of the defined types were placed in this encounter.      Meds This Visit:  Requested Prescriptions      No prescriptions requested or ordered in this encounter       Imaging & Referrals:  None     2/1/2024  Lebron Boo MD

## 2024-02-08 ENCOUNTER — OFFICE VISIT (OUTPATIENT)
Dept: DERMATOLOGY CLINIC | Facility: CLINIC | Age: 79
End: 2024-02-08
Payer: COMMERCIAL

## 2024-02-08 DIAGNOSIS — L81.4 LENTIGO: ICD-10-CM

## 2024-02-08 DIAGNOSIS — L57.0 AK (ACTINIC KERATOSIS): Primary | ICD-10-CM

## 2024-02-08 DIAGNOSIS — L82.1 SEBORRHEIC KERATOSES: ICD-10-CM

## 2024-02-08 DIAGNOSIS — Z08 ENCOUNTER FOR FOLLOW-UP SURVEILLANCE OF SKIN CANCER: ICD-10-CM

## 2024-02-08 DIAGNOSIS — B07.9 VERRUCA: ICD-10-CM

## 2024-02-08 DIAGNOSIS — D23.9 BENIGN NEOPLASM OF SKIN, UNSPECIFIED LOCATION: ICD-10-CM

## 2024-02-08 DIAGNOSIS — D22.9 MULTIPLE NEVI: ICD-10-CM

## 2024-02-08 DIAGNOSIS — Z85.828 ENCOUNTER FOR FOLLOW-UP SURVEILLANCE OF SKIN CANCER: ICD-10-CM

## 2024-02-08 PROCEDURE — 1160F RVW MEDS BY RX/DR IN RCRD: CPT | Performed by: DERMATOLOGY

## 2024-02-08 PROCEDURE — 1159F MED LIST DOCD IN RCRD: CPT | Performed by: DERMATOLOGY

## 2024-02-08 PROCEDURE — 99213 OFFICE O/P EST LOW 20 MIN: CPT | Performed by: DERMATOLOGY

## 2024-02-18 NOTE — PROGRESS NOTES
Freddie Bautista is a 78 year old male.  HPI:     CC:    Chief Complaint   Patient presents with    Upper Body Exam     LOV 23. Patient with Hx of AK's and BCC present for Upper Body skin exam. Has c/o skin growth, on his L side previously treated with cryotherapy. The  growth has fallen off, and is still there. Denies itching. Denies pain.          Allergies:  Cat hair extract and Sulfa antibiotics    HISTORY:    Past Medical History:   Diagnosis Date    Allergic rhinitis     Atherosclerosis of coronary artery     Atrial fibrillation (HCC)     Atrial flutter (HCC)     BCC (basal cell carcinoma of skin)     left side neck    BCC (basal cell carcinoma) 10/2022    Left chest    Benign prostatic hypertrophy     Closed fracture of left distal radius 2019    Colon adenoma 2019    Coronary artery disease     Per N coronary stents    Disorder of thyroid     Essential hypertension     Extrinsic asthma, unspecified     H/O hernia repair     Hernia, inguinal, bilateral     Herniated disc     Herniated disc     High blood pressure     High cholesterol     Hyperlipidemia     Inguinal hernia recurrent unilateral 2013    Pulmonary hypertension (HCC)     Pulmonary hypertension (HCC)     S/P ablation of atrial flutter 2019    Sleep apnea     Thyroid nodule     Per NG: surgery x2     Thyroid nodule     TN (trigeminal neuralgia)     Per NG: Anti-seizure medication    TN (trigeminal neuralgia)       Past Surgical History:   Procedure Laterality Date    ANGIOPLASTY (CORONARY)      CABG      CABG and aortic valve replacement     CARDIOVERSION      CATH ABLATION  2019    for a-flutter in [place]    COLONOSCOPY      COLONOSCOPY  2019    COLONOSCOPY N/A 2019    Procedure: COLONOSCOPY;  Surgeon: River Zuniga MD;  Location: [facility] ENDOSCOPY    EP CARDIOVERSION 1X  8/4/2021         EP CARDIOVERSION 1X  10/18/2021         EP PULMONARY VEIN/A-FIB ABLATION  10/6/2021         EXC SKIN  BENIG 2.1-3CM REMAINDR BODY  08/10/2022    HERNIA SURGERY      OTHER SURGICAL HISTORY      trigeminal neuralgia surgery    OTHER SURGICAL HISTORY      partial thyroidectomy for thyroid nodule    REPLACE AORTIC VALVE W/BYP      REPR CMPL WND HEAD,FAC,HAND 2.6-7.5  08/10/2022    TONSILLECTOMY        Family History   Problem Relation Age of Onset    Prostate Cancer Father     Cancer Father         prostate cancer    Stroke Mother         Per NG: TIA's    Other (Other) Mother         vascular dementia    Skin cancer Maternal Grandmother     Cancer Maternal Grandmother     Kidney Disease Maternal Grandfather         Per NG kidney removal    Skin cancer Maternal Grandfather     Prostate Cancer Paternal Grandfather     Cancer Sister         unknown cancer    Other (Other) Sister         eczema    Heart Disease Other         PEr NG: Family history of CAD      Social History     Socioeconomic History    Marital status:    Tobacco Use    Smoking status: Former     Packs/day: 0.25     Years: 2.00     Additional pack years: 0.00     Total pack years: 0.50     Types: Cigarettes     Quit date: 10/5/1969     Years since quittin.4     Passive exposure: Past    Smokeless tobacco: Never   Vaping Use    Vaping Use: Never used   Substance and Sexual Activity    Alcohol use: Yes     Alcohol/week: 0.8 standard drinks of alcohol     Types: 1 Cans of beer per week     Comment: Occasionally    Drug use: Never   Other Topics Concern    Pt has a pacemaker Yes    Pt has a defibrillator Yes    Reaction to local anesthetic No        Current Outpatient Medications   Medication Sig Dispense Refill    carBAMazepine  MG Oral Tablet 12 Hr Take 1 tablet (400 mg total) by mouth 3 (three) times daily. 270 tablet 1    gabapentin 100 MG Oral Cap Start with 1 pill TID for 1 week, then 2 pills TID for another week, then 3 pills  capsule 5    XARELTO 20 MG Oral Tab Take 1 tablet (20 mg total) by mouth daily. Resume on  at night  90 tablet 1    carvedilol 3.125 MG Oral Tab Take 1 tablet (3.125 mg total) by mouth 2 (two) times daily with meals. 60 tablet 5    sacubitril-valsartan 24-26 MG Oral Tab Take 1 tablet by mouth 2 (two) times daily.      levothyroxine 100 MCG Oral Tab Take 1 tablet (100 mcg total) by mouth daily. 90 tablet 3    Magnesium Oxide 400 (240 Mg) MG Oral Tab Take 1 tablet (400 mg total) by mouth daily.      atorvastatin 80 MG Oral Tab Take 1 tablet (80 mg total) by mouth nightly.      ascorbic acid 1000 MG Oral Tab Take 1 tablet (1,000 mg total) by mouth daily. 30 tablet 0    Vitamin D3, Cholecalciferol, 25 MCG Oral Tab Take 2 tablets (2,000 Units total) by mouth daily. 30 tablet 0    Multiple Vitamins-Minerals (MERLIN MULTIVITAMIN FOR MEN) Oral Tab Take 1 tablet by mouth daily.      zinc sulfate 220 (50 Zn) MG Oral Cap Take 50 mg by mouth daily.      Sennosides-Docusate Sodium (STOOL SOFTENER/LAXATIVE OR) Take by mouth.      aspirin 81 MG Oral Chew Tab Chew by mouth nightly.        Glucosamine-Chondroit-Vit C-Mn (GLUCOSAMINE 1500 COMPLEX) Oral Cap Take 1 tablet by mouth daily. 1200 mg daily        Allergies:   Allergies   Allergen Reactions    Cat Hair Extract      Other reaction(s): CAT HAIR STD EXTRACT    Sulfa Antibiotics      Other reaction(s): SULFA (SULFONAMIDE ANTIBIOTICS)       Past Medical History:   Diagnosis Date    Allergic rhinitis     Atherosclerosis of coronary artery     Atrial fibrillation (HCC)     Atrial flutter (HCC)     BCC (basal cell carcinoma of skin)     left side neck    BCC (basal cell carcinoma) 10/2022    Left chest    Benign prostatic hypertrophy     Closed fracture of left distal radius 2019    Colon adenoma 2019    Coronary artery disease 2000    Per N coronary stents    Disorder of thyroid     Essential hypertension     Extrinsic asthma, unspecified     H/O hernia repair     Hernia, inguinal, bilateral     Herniated disc     Herniated disc     High blood pressure     High  cholesterol     Hyperlipidemia     Inguinal hernia recurrent unilateral 2013    Pulmonary hypertension (HCC)     Pulmonary hypertension (HCC)     S/P ablation of atrial flutter 2019    Sleep apnea     Thyroid nodule     Per NG: surgery x2     Thyroid nodule     TN (trigeminal neuralgia) 1985    Per NG: Anti-seizure medication    TN (trigeminal neuralgia)      Past Surgical History:   Procedure Laterality Date    ANGIOPLASTY (CORONARY)      CABG      CABG and aortic valve replacement     CARDIOVERSION      CATH ABLATION  2019    for a-flutter in [place]    COLONOSCOPY      COLONOSCOPY  2019    COLONOSCOPY N/A 2019    Procedure: COLONOSCOPY;  Surgeon: River Zuniga MD;  Location: [facility] ENDOSCOPY    EP CARDIOVERSION 1X  8/4/2021         EP CARDIOVERSION 1X  10/18/2021         EP PULMONARY VEIN/A-FIB ABLATION  10/6/2021         EXC SKIN BENIG 2.1-3CM REMAINDR BODY  08/10/2022    HERNIA SURGERY      OTHER SURGICAL HISTORY      trigeminal neuralgia surgery    OTHER SURGICAL HISTORY      partial thyroidectomy for thyroid nodule    REPLACE AORTIC VALVE W/BYP      REPR CMPL WND HEAD,FAC,HAND 2.6-7.5  08/10/2022    TONSILLECTOMY       Social History     Socioeconomic History    Marital status:      Spouse name: Not on file    Number of children: Not on file    Years of education: Not on file    Highest education level: Not on file   Occupational History    Not on file   Tobacco Use    Smoking status: Former     Packs/day: 0.25     Years: 2.00     Additional pack years: 0.00     Total pack years: 0.50     Types: Cigarettes     Quit date: 10/5/1969     Years since quittin.4     Passive exposure: Past    Smokeless tobacco: Never   Vaping Use    Vaping Use: Never used   Substance and Sexual Activity    Alcohol use: Yes     Alcohol/week: 0.8 standard drinks of alcohol     Types: 1 Cans of beer per week     Comment: Occasionally    Drug use: Never    Sexual activity: Not on file   Other  Topics Concern    Grew up on a farm Not Asked    History of tanning Not Asked    Outdoor occupation Not Asked    Pt has a pacemaker Yes    Pt has a defibrillator Yes    Reaction to local anesthetic No   Social History Narrative    Not on file     Social Determinants of Health     Financial Resource Strain: Not on file   Food Insecurity: Not on file   Transportation Needs: Not on file   Physical Activity: Not on file   Stress: Not on file   Social Connections: Not on file   Housing Stability: Not on file     Family History   Problem Relation Age of Onset    Prostate Cancer Father     Cancer Father         prostate cancer    Stroke Mother         Per NG: TIA's    Other (Other) Mother         vascular dementia    Skin cancer Maternal Grandmother     Cancer Maternal Grandmother     Kidney Disease Maternal Grandfather         Per NG kidney removal    Skin cancer Maternal Grandfather     Prostate Cancer Paternal Grandfather     Cancer Sister         unknown cancer    Other (Other) Sister         eczema    Heart Disease Other         PEr NG: Family history of CAD       There were no vitals filed for this visit.    HPI:    Chief Complaint   Patient presents with    Upper Body Exam     LOV 8/02/23. Patient with Hx of AK's and BCC present for Upper Body skin exam. Has c/o skin growth, on his L side previously treated with cryotherapy. The  growth has fallen off, and is still there. Denies itching. Denies pain.      Past notes/ records and appropriate/relevant lab results including pathology and past body maps reviewed. Updated and new information noted in current visit.     Patient with history of excision left neck BCC with Dr. Robert healing well.6/22  Right neck and chest  For skin exam.  No particular lesions of concern is outdoors does use sunscreen    Patient presents with concerns above.    Patient has been in their usual state of health.  History, medications, allergies reviewed as noted.      ROS:  Denies any other  systemic complaints.  No new or changeing lesions other than noted above. No fevers, chills, night sweats, unusual sun sensitivity.  No other skin complaints.        History, medications, allergies reviewed as noted.       Physical Examination:     Well-developed well-nourished patient alert oriented in no acute distress.  Exam total-body performed, including scalp, head, neck, face,nails, hair, external eyes, including conjunctival mucosa, eyelids, lips external ears, back, chest,/ breasts, axillae,  abdomen, arms, legs, palms.     Multiple light to medium brown, well marginated, uniformly pigmented, macules and papules 6 mm and less scattered on exam. pigmented lesions examined with dermoscopy benign-appearing patterns.     Waxy tannish keratotic papules scattered, cherry-red vascular papules scattered.    See map today's date for lesions noted .      Otherwise remarkable for lesions as noted on map.  See Assessment /Plan for additional history and physical exam also:    Assessment / plan:    No orders of the defined types were placed in this encounter.      Meds & Refills for this Visit:  Requested Prescriptions      No prescriptions requested or ordered in this encounter         Encounter Diagnoses   Name Primary?    AK (actinic keratosis) Yes    Seborrheic keratoses     Multiple nevi     Lentigo     Benign neoplasm of skin, unspecified location     Verruca     Encounter for follow-up surveillance of skin cancer            Left chest BCC right neck history of BCC post excision no recurrence doing well.    Actinic Keratoses.  Precancerous nature discussed. Sun protection, sunscreen/ blocks encouraged .  Monitoring for new lesions.  Sun damage additional recurrent and new actinic keratoses, skin cancers may occur in areas of prior actinic keratoses, related to past sun exposure to minimize current sun exposure.  Sunscreen applied consistently regularly, reapplication and sun protection while driving  recommended.    SKs over the scalp, back, lesion on hip  Waxy tan keratotic papules lesions in areas of concern as noted reassurance given.  Benign nature discussed.  Possibility of cryo, alphahydroxy acids over-the-counter retinol's discussed.    Notes darker lesion on flank consistent with benign seborrheic keratosis, previous nevus noted at back stable    Multiple benign keratoses as noted.  Persistent verrucous lesions.  Hands stable continue topicals as needed    Pigmented lesions including macule 6 mm left plantar foot, back chest medium brown lesions.  Stable, benign patterns on dermoscopy recommend observation.  5 mm medium brown papule mid left back, 3 mm right lower back as well as lesions on left chest observe.      Few scattered waxy tan-brown papules reassurance regarding benign keratoses more extensive lentigines.  Waxy tan keratotic papules lesions in areas of concern as noted reassurance given.  Benign nature discussed.  Possibility of cryo, alphahydroxy acids over-the-counter retinol's discussed.    Observe lesion at left nasal sidewall appears benign intradermal nevus versus cyst possibility of scarring discussed.  Consider biopsy in future if this continues to change.  Stable    No other susupicious lesions on todays  exam.      6 mm tan macule at plantar left foot consistent with benign nevus recommend observation.    Other waxy tan papules over the face head neck hands no other suspicious lesions patient should have skin check over the summer.    No other susupicious lesions on todays  exam.      Please refer to map for specific lesions.  See additional diagnoses.  Pros cons of various therapies, risks benefits discussed.Pathophysiology discussed with patient.  Therapeutic options reviewed.  See  Medications in grid.  Instructions reviewed at length.    Benign nevi, seborrheic  keratoses, cherry angiomas:  Reassurance regarding other benign skin lesions.Signs and symptoms of skin cancer, ABCDE's  of melanoma discussed with patient. Sunscreen use, sun protection, self exams reviewed.  Followup as noted RTC routine checkup 6 mos - one year or p.r.n.     Note to patient and family: The 21st Century Cures Act makes medical notes like these available to patients. However, be advised this is a medical document. It is intended as [id number] communication and monitoring of a patient's care needs. It is written in medical language and may contain abbreviations or verbiage that are unfamiliar. It may appear blunt or direct. Medical documents are intended to carry relevant information, facts as evident and the clinical opinion of the practitioner.      The patient indicates understanding of these issues and agrees to the plan.  The patient is asked to return as noted in follow-up/ above.    This note was generated using Dragon voice recognition software.  Please contact me regarding any confusion resulting from errors in recognition.   Note to patient and family: The 21st Century Cures Act makes medical notes like these available to patients. However, be advised this is a medical document. It is intended as [id number] communication and monitoring of a patient's care needs. It is written in medical language and may contain abbreviations or verbiage that are unfamiliar. It may appear blunt or direct. Medical documents are intended to carry relevant information, facts as evident and the clinical opinion of the practitioner.

## 2024-03-01 ENCOUNTER — OFFICE VISIT (OUTPATIENT)
Dept: INTERNAL MEDICINE CLINIC | Facility: CLINIC | Age: 79
End: 2024-03-01
Payer: COMMERCIAL

## 2024-03-01 VITALS
DIASTOLIC BLOOD PRESSURE: 70 MMHG | OXYGEN SATURATION: 100 % | TEMPERATURE: 98 F | HEART RATE: 70 BPM | HEIGHT: 67 IN | WEIGHT: 192.13 LBS | BODY MASS INDEX: 30.16 KG/M2 | SYSTOLIC BLOOD PRESSURE: 110 MMHG

## 2024-03-01 DIAGNOSIS — I10 ESSENTIAL HYPERTENSION, BENIGN: ICD-10-CM

## 2024-03-01 DIAGNOSIS — J30.9 ALLERGIC RHINITIS, UNSPECIFIED SEASONALITY, UNSPECIFIED TRIGGER: ICD-10-CM

## 2024-03-01 DIAGNOSIS — I25.10 CORONARY ARTERY DISEASE INVOLVING NATIVE CORONARY ARTERY OF NATIVE HEART WITHOUT ANGINA PECTORIS: ICD-10-CM

## 2024-03-01 DIAGNOSIS — I48.20 CHRONIC ATRIAL FIBRILLATION (HCC): ICD-10-CM

## 2024-03-01 DIAGNOSIS — N40.1 BENIGN PROSTATIC HYPERPLASIA WITH LOWER URINARY TRACT SYMPTOMS, SYMPTOM DETAILS UNSPECIFIED: ICD-10-CM

## 2024-03-01 DIAGNOSIS — Z86.39 HISTORY OF THYROID NODULE: ICD-10-CM

## 2024-03-01 DIAGNOSIS — G50.0 TRIGEMINAL NEURALGIA: ICD-10-CM

## 2024-03-01 DIAGNOSIS — I38 VALVULAR HEART DISEASE: ICD-10-CM

## 2024-03-01 DIAGNOSIS — E78.00 PURE HYPERCHOLESTEROLEMIA: ICD-10-CM

## 2024-03-01 DIAGNOSIS — E03.9 ACQUIRED HYPOTHYROIDISM: ICD-10-CM

## 2024-03-01 DIAGNOSIS — I50.22 CHRONIC SYSTOLIC HEART FAILURE (HCC): ICD-10-CM

## 2024-03-01 DIAGNOSIS — Z00.00 MEDICARE ANNUAL WELLNESS VISIT, SUBSEQUENT: Primary | ICD-10-CM

## 2024-03-01 NOTE — PROGRESS NOTES
Subjective:   Freddie Bautista is a 78 year old male who presents for a Medicare Subsequent Annual Wellness visit (Pt already had Initial Annual Wellness) and scheduled follow up of multiple significant but stable problems.       History/Other:   Fall Risk Assessment:   He has been screened for Falls and is low risk.      Cognitive Assessment:   He had a completely normal cognitive assessment - see flowsheet entries     Functional Ability/Status:   Freddie Bautista has some abnormal functions as listed below:  He has Hearing problems based on screening of functional status.      Depression Screening (PHQ-2/PHQ-9): PHQ-2 SCORE: 0  , done 3/1/2024   If you checked off any problems, how difficult have these problems made it for you to do your work, take care of things at home, or get along with other people?: Not difficult at all             Advanced Directives:   He does have a Living Will but we do NOT have it on file in Epic.    He has a Power of  for Health Care on file in [facility].  Patient has Advance Care Planning documents present in EMR. Reviewed documents with patient (and family/surrogate if present).      Patient Active Problem List   Diagnosis    BPH (benign prostatic hyperplasia)    CAD (coronary artery disease), native coronary artery    Valvular heart disease    Allergic rhinitis due to allergen    Essential hypertension, benign    Pure hypercholesterolemia    Long term current use of anticoagulant    Chronic atrial fibrillation (HCC)    History of thyroid nodule    Prostate cancer screening    Trigeminal neuralgia     Allergies:  He is allergic to cat hair extract and sulfa antibiotics.    Current Medications:  Outpatient Medications Marked as Taking for the 3/1/24 encounter (Office Visit) with Tomas Beaulieu MD   Medication Sig    carBAMazepine  MG Oral Tablet 12 Hr Take 1 tablet (400 mg total) by mouth 3 (three) times daily.    XARELTO 20 MG Oral Tab Take 1 tablet (20 mg total) by mouth  daily. Resume on 5/14 at night    carvedilol 3.125 MG Oral Tab Take 1 tablet (3.125 mg total) by mouth 2 (two) times daily with meals.    sacubitril-valsartan 24-26 MG Oral Tab Take 1 tablet by mouth 2 (two) times daily.    levothyroxine 100 MCG Oral Tab Take 1 tablet (100 mcg total) by mouth daily.    Magnesium Oxide 400 (240 Mg) MG Oral Tab Take 1 tablet (400 mg total) by mouth daily.    atorvastatin 80 MG Oral Tab Take 1 tablet (80 mg total) by mouth nightly.    ascorbic acid 1000 MG Oral Tab Take 1 tablet (1,000 mg total) by mouth daily.    Vitamin D3, Cholecalciferol, 25 MCG Oral Tab Take 2 tablets (2,000 Units total) by mouth daily.    Multiple Vitamins-Minerals (MERLIN MULTIVITAMIN FOR MEN) Oral Tab Take 1 tablet by mouth daily.    zinc sulfate 220 (50 Zn) MG Oral Cap Take 50 mg by mouth daily.    Sennosides-Docusate Sodium (STOOL SOFTENER/LAXATIVE OR) Take by mouth.    aspirin 81 MG Oral Chew Tab Chew by mouth nightly.      Glucosamine-Chondroit-Vit C-Mn (GLUCOSAMINE 1500 COMPLEX) Oral Cap Take 1 tablet by mouth daily. 1200 mg daily        Medical History:  He  has a past medical history of Allergic rhinitis, Atherosclerosis of coronary artery, Atrial fibrillation (HCC), Atrial flutter (HCC), BCC (basal cell carcinoma of skin) (2022), BCC (basal cell carcinoma) (10/2022), Benign prostatic hypertrophy, Closed fracture of left distal radius (6/25/2019), Colon adenoma (11/18/2019), Coronary artery disease (2000), Disorder of thyroid, Essential hypertension, Extrinsic asthma, unspecified, H/O hernia repair, Hernia, inguinal, bilateral, Herniated disc, Herniated disc, High blood pressure, High cholesterol, Hyperlipidemia, Inguinal hernia recurrent unilateral (7/4/2013), Pulmonary hypertension (HCC), Pulmonary hypertension (HCC), S/P ablation of atrial flutter (6/12/2019), Sleep apnea, Thyroid nodule, Thyroid nodule, TN (trigeminal neuralgia) (1985), and TN (trigeminal neuralgia).  Surgical History:  He  has a past  surgical history that includes tonsillectomy; hernia surgery; replace aortic valve w/byp; cardioversion; cath ablation (2019); angioplasty (coronary); cabg; colonoscopy; colonoscopy (2019); colonoscopy (N/A, 2019); ep cardioversion 1x (2021); ep pulmonary vein/a-fib ablation (10/6/2021); ep cardioversion 1x (10/18/2021); exc skin benig 2.1-3cm remaindr body (08/10/2022); repr cmpl wnd head,fac,hand 2.6-7.5 (08/10/2022); other surgical history; and other surgical history.   Family History:  His family history includes Cancer in his father, maternal grandmother, and sister; Heart Disease in an other family member; Kidney Disease in his maternal grandfather; Other in his mother and sister; Prostate Cancer in his father and paternal grandfather; Skin cancer in his maternal grandfather and maternal grandmother; Stroke in his mother.  Social History:  He  reports that he quit smoking about 54 years ago. His smoking use included cigarettes. He has a 0.5 pack-year smoking history. He has been exposed to tobacco smoke. He has never used smokeless tobacco. He reports current alcohol use of about 0.8 standard drinks of alcohol per week. He reports that he does not use drugs.    Tobacco:  He smoked tobacco in the past but quit greater than 12 months ago.  Social History    Tobacco Use      Smoking status: Former        Packs/day: 0.25        Years: 2.00        Additional pack years: 0.00        Total pack years: 0.50        Types: Cigarettes        Quit date: 10/5/1969        Years since quittin.4        Passive exposure: Past      Smokeless tobacco: Never       CAGE Alcohol Screen:   CAGE screening score of 0 on 2024, showing low risk of alcohol abuse.      Patient Care Team:  Tomas Beaulieu MD as PCP - General (Internal Medicine)  River Aguiar MD as Consulting Physician (SURGERY, CARDIOVASCULAR)  River Watt MD as Consulting Physician (Cardiovascular Diseases)  Fritz Hansen  MD AGUILA as Consulting Physician (PULMONARY DISEASES)    Review of Systems  GENERAL: feels well otherwise  SKIN: denies any unusual skin lesions  EYES: denies blurred vision or double vision  HEENT: denies nasal congestion, sinus pain or ST  LUNGS: denies shortness of breath with exertion  CARDIOVASCULAR: denies chest pain on exertion  GI: denies abdominal pain, denies heartburn  : 2 per night nocturia, no complaint of urinary incontinence  MUSCULOSKELETAL: denies back pain  NEURO: denies headaches  PSYCHE: denies depression or anxiety  HEMATOLOGIC: denies hx of anemia  ENDOCRINE:  thyroid history  ALL/ASTHMA: denies hx of allergy or asthma    Objective:   Physical Exam  General Appearance:  Alert, cooperative, no distress, appears stated age   Head:  Normocephalic, without obvious abnormality, atraumatic   Eyes:  PERRL, conjunctiva/corneas clear, EOM's intact, both eyes   Ears:  Normal TM's and external ear canals, both ears   Nose: Nares normal, septum midline, mucosa normal, no drainage or sinus tenderness   Throat: Lips, mucosa, and tongue normal; teeth and gums normal   Neck: Supple, symmetrical, trachea midline, no adenopathy, thyroid: not enlarged, symmetric, no tenderness/mass/nodules, no carotid bruit or JVD   Back:   Symmetric, no curvature, ROM normal, no CVA tenderness   Lungs:   Clear to auscultation bilaterally, respirations unlabored   Chest Wall:  No tenderness or deformity   Heart:  Regular rate and rhythm,  + murmur,   Abdomen:   Soft, non-tender, bowel sounds active all four quadrants,  no masses, no organomegaly   Genitalia: Deferred to urologist   Rectal: Deferred to urologist   Extremities: Extremities normal, atraumatic, no cyanosis or edema   Pulses: 2+ and symmetric   Skin: Skin color, texture, turgor normal, no rashes or lesions   Lymph nodes: Cervical, supraclavicular nodes normal   Neurologic: Normal     /73 (BP Location: Left arm, Patient Position: Sitting, Cuff Size: large)    Pulse 70   Temp 98 °F (36.7 °C) (Tympanic)   Ht 5' 7\" (1.702 m)   Wt 192 lb 1.6 oz (87.1 kg)   SpO2 100%   BMI 30.09 kg/m²  Estimated body mass index is 30.09 kg/m² as calculated from the following:    Height as of this encounter: 5' 7\" (1.702 m).    Weight as of this encounter: 192 lb 1.6 oz (87.1 kg).    Medicare Hearing Assessment:   Hearing Screening    Time taken: 3/1/2024  1:56 PM  Screening Method: Questionnaire  I have a problem hearing over the telephone: No I have trouble following the conversations when two or more people are talking at the same time: Sometimes   I have trouble understanding things on the TV: No I have to strain to understand conversations: No   I have to worry about missing the telephone ring or doorbell: No I have trouble hearing conversations in a noisy background such as a crowded room or restaurant: No   I get confused about where sounds come from: No I misunderstand some words in a sentence and need to ask people to repeat themselves: No   I especially have trouble understanding the speech of women and children: No I have trouble understanding the speaker in a large room such as at a meeting or place of Catholic: No   Many people I talk to seem to mumble (or don't speak clearly): No People get annoyed because I misunderstand what they say: No   I misunderstand what others are saying and make inappropriate responses: No I avoid social activities because I cannot hear well and fear I will reply improperly: No   Family members and friends have told me they think I may have hearing loss: Sometimes             Visual Acuity:   Right Eye Visual Acuity: Corrected Right Eye Chart Acuity: 20/50   Left Eye Visual Acuity: Corrected Left Eye Chart Acuity: 20/40   Both Eyes Visual Acuity: Corrected Both Eyes Chart Acuity: 20/30   Able To Tolerate Visual Acuity: Yes        Assessment & Plan:   Freddie Bautista is a 78 year old male who presents for a Medicare Assessment.     (Z00.00) Medicare  annual wellness visit, subsequent  (primary encounter diagnosis)  Plan: pt adavised to get rsv vaccine and latest covid booster    (I48.20) Chronic atrial fibrillation (HCC)  Plan: had ablation before, on eliquis for stroke prevention.  He continues on eliquis for stroke prevention.     (I25.10) Coronary artery disease involving native coronary artery of native heart without angina pectoris  Plan: hx of cabg before, he remains on asa and statin; ff by cardio    (I50.22) Chronic systolic heart failure (HCC)  Plan: pt appears compensated and euvolemic, he is on entresto, betablocker ; he continues to ffup with cardio.     (I10) Essential hypertension, benign  Plan: bp controlled.  Cpm.     (E78.00) Pure hypercholesterolemia  Plan: on statin.     (E03.9) Acquired hypothyroidism  Plan: on LT4.    (Z86.39) History of thyroid nodule  Plan: remote hx of partial thyroidectomy    (G50.0) Trigeminal neuralgia  Plan: had recently seen neuro DR Fry, was advised  to try gabapentin but pt chose not to and  remain on his  carbamezapine snce he said neuralgia  had improved.    (N40.1) Benign prostatic hyperplasia with lower urinary tract symptoms, symptom details unspecified  Plan: pt had been  ff by urologist , psa was normal recently    (I38) Valvular heart disease  Plan: hx of AVR before, had beenff by cardio.    (J30.9) Allergic rhinitis, unspecified seasonality, unspecified trigger  Plan: continue allergy   meds     The patient indicates understanding of these issues and agrees to the plan.  Reinforced healthy diet, lifestyle, and exercise.      No follow-ups on file.     Tomas Beaulieu MD, 3/1/2024     Supplementary Documentation:   General Health:  In the past six months, have you lost more than 10 pounds without trying?: 3 - Don't know  Has your appetite been poor?: No  Type of Diet: Low Salt  How does the patient maintain a good energy level?: Other  How would you describe your daily physical activity?:  Light  How would you describe your current health state?: Good  How do you maintain positive mental well-being?: Social Interaction;Puzzles;Games;Visiting Friends;Visiting Family  On a scale of 0 to 10, with 0 being no pain and 10 being severe pain, what is your pain level?: 1 - (Mild)  In the past six months, have you experienced urine leakage?: 0-No  At any time do you feel concerned for the safety/well-being of yourself and/or your children, in your home or elsewhere?: No  Have you had any immunizations at another office such as Influenza, Hepatitis B, Tetanus, or Pneumococcal?: Yes        Freddie Bautista's SCREENING SCHEDULE   Tests on this list are recommended by your physician but may not be covered, or covered at this frequency, by your insurer.   Please check with your insurance carrier before scheduling to verify coverage.   PREVENTATIVE SERVICES FREQUENCY &  COVERAGE DETAILS LAST COMPLETION DATE   Diabetes Screening    Fasting Blood Sugar / Glucose    One screening every 12 months if never tested or if previously tested but not diagnosed with pre-diabetes   One screening every 6 months if diagnosed with pre-diabetes Lab Results   Component Value Date     (H) 12/04/2023        Cardiovascular Disease Screening    Lipid Panel  Cholesterol  Lipoprotein (HDL)  Triglycerides Covered every 5 years for all Medicare beneficiaries without apparent signs or symptoms of cardiovascular disease Lab Results   Component Value Date    CHOLEST 156 04/25/2023    HDL 77 (H) 04/25/2023    LDL 66 04/25/2023    TRIG 66 04/25/2023         Electrocardiogram (EKG)   Covered if needed at Welcome to Medicare, and non-screening if indicated for medical reasons 04/27/2023      Ultrasound Screening for Abdominal Aortic Aneurysm (AAA) Covered once in a lifetime for one of the following risk factors    Men who are 65-75 years old and have ever smoked    Anyone with a family history 06/22/2020     Colorectal Cancer Screening  Covered  for ages 50-85; only need ONE of the following:    Colonoscopy   Covered every 10 years    Covered every 2 years if patient is at high risk or previous colonoscopy was abnormal 11/18/2019    Health Maintenance   Topic Date Due    Colorectal Cancer Screening  11/18/2024       Flexible Sigmoidoscopy   Covered every 4 years -    Fecal Occult Blood Test Covered annually -   Prostate Cancer Screening    Prostate-Specific Antigen (PSA) Annually Lab Results   Component Value Date    PSA 5.02 (H) 06/13/2023     Health Maintenance   Topic Date Due    PSA  01/23/2025      Immunizations    Influenza Covered once per flu season  Please get every year 10/17/2023  No recommendations at this time    Pneumococcal Each vaccine ([id number] & [id number]) covered once after 65 Prevnar 13: -    [id number]: -     No recommendations at this time    Hepatitis B One screening covered for patients with certain risk factors   -  No recommendations at this time    Tetanus Toxoid Not covered by Medicare Part B unless medically necessary (cut with metal); may be covered with your pharmacy prescription benefits -    Tetanus, Diptheria and Pertusis TD and TDaP Not covered by Medicare Part B -  No recommendations at this time    Zoster Not covered by Medicare Part B; may be covered with your pharmacy  prescription benefits -  No recommendations at this time     Annual Monitoring of Persistent Medications (ACE/ARB, digoxin diuretics, anticonvulsants)    Potassium Annually Lab Results   Component Value Date    K 4.5 12/04/2023         Creatinine   Annually Lab Results   Component Value Date    CREATSERUM 0.93 12/04/2023         BUN Annually Lab Results   Component Value Date    BUN 17 12/04/2023       Drug Serum Conc Annually No results found for: \"DIGOXIN\", \"DIG\", \"VALP\"

## 2024-03-20 DIAGNOSIS — E03.9 ACQUIRED HYPOTHYROIDISM: ICD-10-CM

## 2024-03-21 RX ORDER — LEVOTHYROXINE SODIUM 0.1 MG/1
100 TABLET ORAL DAILY
Qty: 90 TABLET | Refills: 3 | Status: SHIPPED | OUTPATIENT
Start: 2024-03-21

## 2024-03-21 NOTE — TELEPHONE ENCOUNTER
Refill passed per [facility] protocol.  Requested Prescriptions   Pending Prescriptions Disp Refills    LEVOTHYROXINE 100 MCG Oral Tab [Pharmacy Med Name: LEVOTHYROXINE 0.100MG (100MCG) TAB] 90 tablet 3     Sig: TAKE 1 TABLET(100 MCG) BY MOUTH DAILY       Thyroid Medication Protocol Passed - 3/20/2024  5:46 AM        Passed - TSH in past 12 months        Passed - Last TSH value is normal     Lab Results   Component Value Date    TSH 3.240 10/27/2023                 Passed - In person appointment or virtual visit in the past 12 mos or appointment in next 3 mos     Recent Outpatient Visits              2 weeks ago Medicare annual wellness visit, subsequent    [facility] Tomas Beaulieu MD    Office Visit    1 month ago AK (actinic keratosis)    [facility] [address], [place] Elvira Montano MD    Office Visit    1 month ago Elevated PSA    [facility] Lebron Boo MD    Office Visit    3 months ago Trigeminal neuralgia    [facility] Bahman Fry MD    Office Visit    6 months ago Medicare annual wellness visit, subsequent    [facility] Tomas Beaulieu MD    Office Visit          Future Appointments         Provider Department Appt Notes    In 4 months Elvira Montano MD [facility] [address], [place] 6 mo f/u    In 10 months Lebron Boo MD [facility] 1 YEAR    In 11 months Tomas Beaulieu MD [facility]                  Recent Outpatient Visits              2 weeks ago Medicare annual wellness visit, subsequent    [facility] Tomas Beaulieu MD    Office Visit    1 month ago AK (actinic keratosis)    [facility]  [address], [place] Elvira Montano MD    Office Visit    1 month ago Elevated PSA    [facility] Lebron Boo MD    Office Visit    3 months ago Trigeminal neuralgia    [facility] Bahman Fry MD    Office Visit    6 months ago Medicare annual wellness visit, subsequent    [facility] Tomas Beaulieu MD    Office Visit          Future Appointments         Provider Department Appt Notes    In 4 months Elvira Montano MD [facility], [address], [place] 6 mo f/u    In 10 months Lebron Boo MD [facility] 1 YEAR    In 11 months Tomas Beaulieu MD [facility] pX

## 2024-04-12 ENCOUNTER — PATIENT MESSAGE (OUTPATIENT)
Dept: NEUROLOGY | Facility: CLINIC | Age: 79
End: 2024-04-12

## 2024-04-15 NOTE — TELEPHONE ENCOUNTER
From: Freddie Bautista  To: Bahman Fry  Sent: 4/12/2024 3:58 PM CDT  Subject: Neurosurgeon referral    I saw Dr. Fry for Trigeminal Neuralgia last December 4 and he referred me to a Neurosurgeon. Can you please tell me the name of that ?

## 2024-04-18 ENCOUNTER — OFFICE VISIT (OUTPATIENT)
Dept: SURGERY | Facility: CLINIC | Age: 79
End: 2024-04-18
Payer: COMMERCIAL

## 2024-04-18 VITALS
HEART RATE: 70 BPM | BODY MASS INDEX: 28.72 KG/M2 | HEIGHT: 67 IN | SYSTOLIC BLOOD PRESSURE: 124 MMHG | DIASTOLIC BLOOD PRESSURE: 78 MMHG | WEIGHT: 183 LBS

## 2024-04-18 DIAGNOSIS — G50.0 TRIGEMINAL NEURALGIA: Primary | ICD-10-CM

## 2024-04-18 PROBLEM — I50.22 CHRONIC SYSTOLIC CHF (CONGESTIVE HEART FAILURE) (HCC): Status: ACTIVE | Noted: 2021-09-16

## 2024-04-18 PROBLEM — I44.2 AV BLOCK, 3RD DEGREE (HCC): Status: ACTIVE | Noted: 2023-06-15

## 2024-04-18 PROCEDURE — 3074F SYST BP LT 130 MM HG: CPT | Performed by: NURSE PRACTITIONER

## 2024-04-18 PROCEDURE — 1160F RVW MEDS BY RX/DR IN RCRD: CPT | Performed by: NURSE PRACTITIONER

## 2024-04-18 PROCEDURE — 1159F MED LIST DOCD IN RCRD: CPT | Performed by: NURSE PRACTITIONER

## 2024-04-18 PROCEDURE — 3008F BODY MASS INDEX DOCD: CPT | Performed by: NURSE PRACTITIONER

## 2024-04-18 PROCEDURE — 99205 OFFICE O/P NEW HI 60 MIN: CPT | Performed by: NURSE PRACTITIONER

## 2024-04-18 PROCEDURE — 3078F DIAST BP <80 MM HG: CPT | Performed by: NURSE PRACTITIONER

## 2024-04-18 NOTE — PATIENT INSTRUCTIONS
MRI of the trgeminal nerve  After you have your appointment with MRI, call our office to follow up with Dr. Qiu at either [place] or [place]    Continue pain meds per Neuro    Refill policies:    Allow 2-3 business days for refills; controlled substances may take longer.  Contact your pharmacy at least 5 days prior to running out of medication and have them send an electronic request or submit request through the “request refill” option in your [employer] account.  Refills are not addressed on weekends; covering physicians do not authorize routine medications on weekends.  No narcotics or controlled substances are refilled after noon on Fridays or by on call physicians.  By law, narcotics must be electronically prescribed.  A 30 day supply with no refills is the maximum allowed.  If your prescription is due for a refill, you may be due for a follow up appointment.  To best provide you care, patients receiving routine medications need to be seen at least once a year.  Patients receiving narcotic/controlled substance medications need to be seen at least once every 3 months.  In the event that your preferred pharmacy does not have the requested medication in stock (e.g. Backordered), it is your responsibility to find another pharmacy that has the requested medication available.  We will gladly send a new prescription to that pharmacy at your request.    Scheduling Tests:    If your physician has ordered radiology tests such as MRI or CT scans, please contact Central Scheduling at [phone] right away to schedule the test.  Once scheduled, the [facility] Centralized Referral Team will work with your insurance carrier to obtain pre-certification or prior authorization.  Depending on your insurance carrier, approval may take 3-10 days.  It is highly recommended patients assure they have received an authorization before having a test performed.  If test is done without insurance authorization, patient may be responsible for  the entire amount billed.      Precertification and Prior Authorizations:  If your physician has recommended that you have a procedure or additional testing performed the [facility] Centralized Referral Team will contact your insurance carrier to obtain pre-certification or prior authorization.    You are strongly encouraged to contact your insurance carrier to verify that your procedure/test has been approved and is a COVERED benefit.  Although the [facility] Centralized Referral Team does its due diligence, the insurance carrier gives the disclaimer that \"Although the procedure is authorized, this does not guarantee payment.\"    Ultimately the patient is responsible for payment.   Thank you for your understanding in this matter.  Paperwork Completion:  If you require FMLA or disability paperwork for your recovery, please make sure to either drop it off or have it faxed to our office at [phone]. Be sure the form has your name and date of birth on it.  The form will be faxed to our Forms Department and they will complete it for you.  There is a 25$ fee for all forms that need to be filled out.  Please be aware there is a 10-14 day turnaround time.  You will need to sign a release of information (LEBRON) form if your paperwork does not come with one.  You may call the Forms Department with any questions at [phone].  Their fax number is [phone].

## 2024-04-18 NOTE — H&P
[facility]  Neurological Surgery Clinic Note    Freddie Bautista  9/7/1945  [id number]  PCP: Tomas Beaulieu MD    REASON FOR VISIT:  Trigeminal Neuralgia    HISTORY OF PRESENT ILLNESS:  Freddie Bautista is a 78 year old male with a PMH of CAD s/p 2 stents, bovine heart valve on Xarelto, PPM and trigeminal neuralgia referred by Dr Fry. In 1989 had decompression surgery, [place] with Dr. Ángel Preciado, [facility]. He stated that immediately following surgery he felt relief. He was off meds for 3.5 years. His neuralgia pain comes and goes in waves and will go into boughts of remission. Before when the pain would come back it was more of a nuisance. He has been taking Carbamazepine. It was recommended to start Gabapentin but he is concerned of the adverse effects of suicidal thoughts.  Last couple weeks while in [place] his pain was really terrible. For example, on a bike ride the wind blowing on his face would cause severe pain. When he tries to talk, his right eye starts watering uncontrollably. He states he has lost 6 pounds within the last few weeks. During the visit much of the history was provided by Freddie's wife, Taylor since he was in so much pain with talking.    Pain description  Onset: sudden onset  Location: right side of jaw and head  Duration: subsided after movement stops  Characteristics- electrical burning pain  Aggravating Factors: talking eating   Alleviating Factors: sleeping, not talking or eating  Radiation: radiates towards scalp, around nose and under eye  Timing: with movement of jaw  Severity: 8/10      PAST MEDICAL HISTORY:  Past Medical History:    Allergic rhinitis    Atherosclerosis of coronary artery    Atrial fibrillation (HCC)    Atrial flutter (HCC)    BCC (basal cell carcinoma of skin)    left side neck    BCC (basal cell carcinoma)    Left chest    Benign prostatic hypertrophy    Closed fracture of left distal radius    Colon adenoma     Coronary artery disease    Per N coronary stents    Disorder of thyroid    Essential hypertension    Extrinsic asthma, unspecified    H/O hernia repair    Hernia, inguinal, bilateral    Herniated disc    Herniated disc    High blood pressure    High cholesterol    Hyperlipidemia    Inguinal hernia recurrent unilateral    Pulmonary hypertension (HCC)    Pulmonary hypertension (HCC)    S/P ablation of atrial flutter    Sleep apnea    Thyroid nodule    Per NG: surgery x2     Thyroid nodule    TN (trigeminal neuralgia)    Per NG: Anti-seizure medication    TN (trigeminal neuralgia)       PAST SURGICAL HISTORY:  Past Surgical History:   Procedure Laterality Date    Angioplasty (coronary)      Cabg      CABG and aortic valve replacement     Cardioversion      Cath ablation  2019    for a-flutter in [place]    Colonoscopy      Colonoscopy  2019    Colonoscopy N/A 2019    Procedure: COLONOSCOPY;  Surgeon: River Zuniga MD;  Location: [facility] ENDOSCOPY    Ep cardioversion 1x  8/4/2021         Ep cardioversion 1x  10/18/2021         Ep pulmonary vein/a-fib ablation  10/6/2021         Exc skin benig 2.1-3cm remaindr body  08/10/2022    Hernia surgery      Other surgical history      trigeminal neuralgia surgery    Other surgical history      partial thyroidectomy for thyroid nodule    Replace aortic valve w/byp      Repr cmpl wnd head,fac,hand 2.6-7.5  08/10/2022    Tonsillectomy         FAMILY HISTORY:  family history includes Cancer in his father, maternal grandmother, and sister; Heart Disease in an other family member; Kidney Disease in his maternal grandfather; Other in his mother and sister; Prostate Cancer in his father and paternal grandfather; Skin cancer in his maternal grandfather and maternal grandmother; Stroke in his mother.    SOCIAL HISTORY:   reports that he quit smoking about 54 years ago. His smoking use included cigarettes. He started smoking about 56 years ago. He has a 0.5  pack-year smoking history. He has been exposed to tobacco smoke. He has never used smokeless tobacco. He reports current alcohol use of about 0.8 standard drinks of alcohol per week. He reports that he does not use drugs.    ALLERGIES:  Allergies   Allergen Reactions    Cat Hair Extract      Other reaction(s): CAT HAIR STD EXTRACT    Sulfa Antibiotics      Other reaction(s): SULFA (SULFONAMIDE ANTIBIOTICS)       MEDICATIONS:  Current Outpatient Medications on File Prior to Visit   Medication Sig Dispense Refill    levothyroxine 100 MCG Oral Tab Take 1 tablet (100 mcg total) by mouth daily. 90 tablet 3    carBAMazepine  MG Oral Tablet 12 Hr Take 1 tablet (400 mg total) by mouth 3 (three) times daily. 270 tablet 1    gabapentin 100 MG Oral Cap Start with 1 pill TID for 1 week, then 2 pills TID for another week, then 3 pills  capsule 5    XARELTO 20 MG Oral Tab Take 1 tablet (20 mg total) by mouth daily. Resume on 5/14 at night 90 tablet 1    carvedilol 3.125 MG Oral Tab Take 1 tablet (3.125 mg total) by mouth 2 (two) times daily with meals. 60 tablet 5    sacubitril-valsartan 24-26 MG Oral Tab Take 1 tablet by mouth 2 (two) times daily.      Magnesium Oxide 400 (240 Mg) MG Oral Tab Take 1 tablet (400 mg total) by mouth daily.      atorvastatin 80 MG Oral Tab Take 1 tablet (80 mg total) by mouth nightly.      ascorbic acid 1000 MG Oral Tab Take 1 tablet (1,000 mg total) by mouth daily. 30 tablet 0    Vitamin D3, Cholecalciferol, 25 MCG Oral Tab Take 2 tablets (2,000 Units total) by mouth daily. 30 tablet 0    Multiple Vitamins-Minerals (MERLIN MULTIVITAMIN FOR MEN) Oral Tab Take 1 tablet by mouth daily.      zinc sulfate 220 (50 Zn) MG Oral Cap Take 50 mg by mouth daily.      Sennosides-Docusate Sodium (STOOL SOFTENER/LAXATIVE OR) Take by mouth.      aspirin 81 MG Oral Chew Tab Chew by mouth nightly.        Glucosamine-Chondroit-Vit C-Mn (GLUCOSAMINE 1500 COMPLEX) Oral Cap Take 1 tablet by mouth daily. 1200  mg daily        No current facility-administered medications on file prior to visit.       REVIEW OF SYSTEMS:  A 10-point system was reviewed.  Pertinent positives and negatives are noted in HPI.      PHYSICAL EXAMINATION:  VITAL SIGNS: /78   Pulse 70   Ht 67\"   Wt 183 lb (83 kg)   BMI 28.66 kg/m²     A&Ox3, no acute distress  PERRL, EOMi, FS, TM  Full strength x 4, no drift  Sensation intact     ASSESSMENT:  77 yo male with trigeminal Neuralgia  On Xarelto for heart valve    Plan:  MRI brain with Trigeminal protocol  Will need PPM coordination  Once MRI scheduled, schedule a follow up with Dr. Qiu to review results      EVER Vizcarra, CNP  Neurological Surgery  [facility]    Care Time: 30 min including face to face time, chart review, imaging interpretation, and coordination of care

## 2024-04-23 ENCOUNTER — OFFICE VISIT (OUTPATIENT)
Dept: PAIN CLINIC | Facility: CLINIC | Age: 79
End: 2024-04-23

## 2024-04-23 DIAGNOSIS — G50.0 TRIGEMINAL NEURALGIA: Primary | ICD-10-CM

## 2024-04-23 NOTE — PROGRESS NOTES
Freddie Bautista is a 78 year old male No chief complaint on file..     Chief Complaint:    Trigeminal neuralgia         Self reported health status:     Patient reported severity of symptom(s) over the last 24 hours  With zero reporting no symptoms and 10 reporting the worst severity    Symptom 1: 3-4; can increase to 6 out of 10    Response to last TX:  N/A    HPI: Freddie Bautista is a 78 year old male with a PMH of CAD s/p 2 stents, bovine heart valve on Xarelto. In 1989 decompression surgery, [place] with Dr. Ángel Preciado, [facility]. He stated that immediately following surgery he felt relief. He was off meds for 3.5 years. His neuralgia pain comes and goes in waves and will go into boughts of remission. It is annoying when the pain would come back. He reports wind blowing on his face would cause severe pain. When he tries to talk, his right eye starts watering uncontrollably. He has had acupuncture before and has help manage pain. He has a surgery consult in June and is hoping acupuncture can manage his pain till then.     Objective (Pulse, Tongue, Vitals):    Tongue: Red    Pulse: Rapid    TCM Diagnosis: Plan of Care: Channel Obstruction SJ, GB      Acupuncture Therapy:    Set 1: Jenna Elijah, GB-34, Ce Cardona Li, DEEP-3    Set 2: SJ-5, LI-4, LI-11     Patient Goals: To reduce pain by 50% and manage it till her consult in June.    Face Time: 30 minuye  Total Time: 55 minutes      Treatment performed by Monica BRIGHT LAc. at [facility].    No follow-ups on file.    Monica Corona L.AC  4/23/2024  4:38 PM

## 2024-04-25 ENCOUNTER — TELEPHONE (OUTPATIENT)
Dept: INTERNAL MEDICINE CLINIC | Facility: CLINIC | Age: 79
End: 2024-04-25

## 2024-04-25 NOTE — TELEPHONE ENCOUNTER
Pt called wanting a refill for his carbamazepine  mg. Inform pt that there was a script sent to his pharmacy by . Pharmacy was called and they have script on file. She will fill it pt is aware.      carBAMazepine  MG Oral Tablet 12 Hr 270 tablet 1 12/4/2023 --    Sig - Route: Take 1 tablet (400 mg total) by mouth 3 (three) times daily. - Oral    Sent to pharmacy as: carBAMazepine  MG Oral Tablet Extended Release 12 Hour (TEGRETOL XR)    E-Prescribing Status: Receipt confirmed by pharmacy (12/4/2023  7:56 AM CST)

## 2024-04-30 ENCOUNTER — OFFICE VISIT (OUTPATIENT)
Dept: PAIN CLINIC | Facility: CLINIC | Age: 79
End: 2024-04-30

## 2024-04-30 DIAGNOSIS — G50.0 TRIGEMINAL NEURALGIA: Primary | ICD-10-CM

## 2024-05-01 NOTE — PROGRESS NOTES
Freddie Bautista is a 78 year old male No chief complaint on file..     Chief Complaint:    Trigeminal neuralgia         Self reported health status:      Patient reported severity of symptom(s) over the last 24 hours  With zero reporting no symptoms and 10 reporting the worst severity     Symptom 1: 3     Response to last TX 4/23/2024: Freddie  reports after his treatment his pain significantly decrease, 1-2 out of 10, for about 2 days. His pain is back but less.     HPI 4/23/2024: Freddie Bautista is a 78 year old male with a PMH of CAD s/p 2 stents, bovine heart valve on Xarelto. In 1989 decompression surgery, [place] with Dr. Ángel Preciado, [facility]. He stated that immediately following surgery he felt relief. He was off meds for 3.5 years. His neuralgia pain comes and goes in waves and will go into boughts of remission. It is annoying when the pain would come back. He reports wind blowing on his face would cause severe pain. When he tries to talk, his right eye starts watering uncontrollably. He has had acupuncture before and has help manage pain. He has a surgery consult in June and is hoping acupuncture can manage his pain till then.      Objective (Pulse, Tongue, Vitals):     Tongue: Red     Pulse: Rapid     TCM Diagnosis: Plan of Care: Channel Obstruction SJ, GB        Acupuncture Therapy:     Set 1: Bilateral: DEEP-3, LI-11, LI-4     Set 2: Left: Ross allison, Aleisha Wheeler Li, Cexia wheeler Li, Zhou Robledo     Patient Goals: To reduce pain by 50% and manage it till her consult in June.     Face Time: 26 minuye  Total Time: 50 minutes       Treatment performed by Monica BRIGHT LAc. at [facility].    No follow-ups on file.    Monica Corona L.AC  5/1/2024  9:12 AM

## 2024-05-03 ENCOUNTER — OFFICE VISIT (OUTPATIENT)
Dept: PAIN CLINIC | Facility: CLINIC | Age: 79
End: 2024-05-03

## 2024-05-03 DIAGNOSIS — G50.0 TRIGEMINAL NEURALGIA: Primary | ICD-10-CM

## 2024-05-03 NOTE — PROGRESS NOTES
Freddie Bautista is a 78 year old male No chief complaint on file..     Chief Complaint:    Trigeminal neuralgia      Self reported health status:      Patient reported severity of symptom(s) over the last 24 hours  With zero reporting no symptoms and 10 reporting the worst severity     Symptom 1: 0-2     Response to last TX 4/23/2024: Freddie reports he had lost 25 lbs because he could not eat due to the pain. Now for for the first time he can eat food with out pain. He reports to having mild constipation, stress is low. He does complain of poor sleep.     HPI 4/23/2024: Freddie Bautista is a 78 year old male with a PMH of CAD s/p 2 stents, bovine heart valve on Xarelto. In 1989 decompression surgery, [place] with Dr. Ángel Preciado, [facility]. He stated that immediately following surgery he felt relief. He was off meds for 3.5 years. His neuralgia pain comes and goes in waves and will go into boughts of remission. It is annoying when the pain would come back. He reports wind blowing on his face would cause severe pain. When he tries to talk, his right eye starts watering uncontrollably. He has had acupuncture before and has help manage pain. He has a surgery consult in June and is hoping acupuncture can manage his pain till then.      Objective (Pulse, Tongue, Vitals):     Tongue: Red     Pulse: slipper     TCM Diagnosis: Plan of Care: Channel Obstruction SJ, GB        Acupuncture Therapy:     Set 1: Bilateral: DEEP-3, LI-11, LI-4, Croatian tobar     Set 2: Left: Ross allison, Aleisha Wheeler Li, Ortega wheeler Li, Zhou Robledo     Patient Goals: To reduce pain by 50% and manage it till her consult in June.     Face Time: 26 minutes  Total Time: 50  minutes       Treatment performed by Monica BRIGHT LAc. at [facility].    No follow-ups on file.    Monica Corona L.AC  5/3/2024  3:47 PM

## 2024-05-09 ENCOUNTER — OFFICE VISIT (OUTPATIENT)
Dept: PAIN CLINIC | Facility: CLINIC | Age: 79
End: 2024-05-09

## 2024-05-09 DIAGNOSIS — G50.0 TRIGEMINAL NEURALGIA: Primary | ICD-10-CM

## 2024-05-09 NOTE — PROGRESS NOTES
Freddie Bautista is a 78 year old male No chief complaint on file..     Chief Complaint:    Trigeminal neuralgia      Self reported health status:      Patient reported severity of symptom(s) over the last 24 hours  With zero reporting no symptoms and 10 reporting the worst severity     Symptom 1: 5     Response to last TX 5/3/2024:  Freddie reports after the treatment on Friday his pain remain low, but on Sunday and Monday it was very painful back to a 5-6 out of 10. Today it feel like a 5 out of 10.    Response to last TX 4/23/2024: Freddie reports he had lost 25 lbs because he could not eat due to the pain. Now for for the first time he can eat food with out pain. He reports to having mild constipation, stress is low. He does complain of poor sleep.     HPI 4/23/2024: Freddie Bautista is a 78 year old male with a PMH of CAD s/p 2 stents, bovine heart valve on Xarelto. In 1989 decompression surgery, [place] with Dr. Ángel Preciado, [facility]. He stated that immediately following surgery he felt relief. He was off meds for 3.5 years. His neuralgia pain comes and goes in waves and will go into boughts of remission. It is annoying when the pain would come back. He reports wind blowing on his face would cause severe pain. When he tries to talk, his right eye starts watering uncontrollably. He has had acupuncture before and has help manage pain. He has a surgery consult in June and is hoping acupuncture can manage his pain till then.      Objective (Pulse, Tongue, Vitals):     Tongue: Red     Pulse: slipper     TCM Diagnosis: Plan of Care: Channel Obstruction SJ, GB        Acupuncture Therapy:     Set 1: Bilateral: DEEP-3, LI-11, LI-4, Sinhala tobar     Set 2: Left: Jenna Nava, Ross allison, Aleisha Jorgensen, Louisa Epperson    Set 3: Left: SI-3, SJ-3, GB-34, SJ-5, SJ-6     Patient Goals: To reduce pain by 50% and manage it till her consult in June.     Face Time: 26 minutes  Total Time: 50  minutes       Treatment performed  by Monica BRIGHT LAc. at [facility].    No follow-ups on file.    Monica Corona L.AC  5/9/2024  1:53 PM

## 2024-05-14 ENCOUNTER — OFFICE VISIT (OUTPATIENT)
Dept: PAIN CLINIC | Facility: CLINIC | Age: 79
End: 2024-05-14

## 2024-05-14 DIAGNOSIS — G50.0 TRIGEMINAL NEURALGIA: Primary | ICD-10-CM

## 2024-05-14 NOTE — PROGRESS NOTES
Freddie Bautista is a 78 year old male No chief complaint on file..     Chief Complaint:    Trigeminal neuralgia      Self reported health status:      Patient reported severity of symptom(s) over the last 24 hours  With zero reporting no symptoms and 10 reporting the worst severity     Symptom 1: 5     Response to last TX 5/9/2024:  Freddie reports before starting acupuncture his pain was a 6-7 and could get up to a 9-10. Since starting acupuncture is pain has dropped to a solid 5 out of 10. Yesterday was a tough day, but today he is feeling good.     Response to last TX 5/3/2024:  Freddie reports after the treatment on Friday his pain remain low, but on Sunday and Monday it was very painful back to a 5-6 out of 10. Today it feel like a 5 out of 10.     HPI 4/23/2024: Freddie Bautista is a 78 year old male with a PMH of CAD s/p 2 stents, bovine heart valve on Xarelto. In 1989 decompression surgery, [place] with Dr. Ángel Preciado, [facility]. He stated that immediately following surgery he felt relief. He was off meds for 3.5 years. His neuralgia pain comes and goes in waves and will go into boughts of remission. It is annoying when the pain would come back. He reports wind blowing on his face would cause severe pain. When he tries to talk, his right eye starts watering uncontrollably. He has had acupuncture before and has help manage pain. He has a surgery consult in June and is hoping acupuncture can manage his pain till then.      Objective (Pulse, Tongue, Vitals):     Tongue: Cracked , red     Pulse: Moderate     TCM Diagnosis: Plan of Care: Channel Obstruction SJ, GB        Acupuncture Therapy:     Set 1: Left: Ross Stevens, Ross eliud, DEEP-8, (2), HT-7. HY-6      Set 2: Bilateral: Louisa Epperson, Sandeep louis     Set 3: Left: SJ-14, SJ-13, SJ-12, JS-11     Patient Goals: To reduce pain by 50% and manage it till her consult in June.     Face Time: 26 minutes  Total Time: 50  minutes     Treatment performed by  Monica BRIGHT LAc. at [facility].    No follow-ups on file.    Monica Corona L.AC  5/14/2024  11:21 AM

## 2024-05-15 ENCOUNTER — OFFICE VISIT (OUTPATIENT)
Dept: INTERNAL MEDICINE CLINIC | Facility: CLINIC | Age: 79
End: 2024-05-15

## 2024-05-15 VITALS
HEART RATE: 70 BPM | OXYGEN SATURATION: 97 % | WEIGHT: 187.81 LBS | TEMPERATURE: 98 F | SYSTOLIC BLOOD PRESSURE: 122 MMHG | HEIGHT: 67 IN | BODY MASS INDEX: 29.48 KG/M2 | DIASTOLIC BLOOD PRESSURE: 83 MMHG

## 2024-05-15 DIAGNOSIS — J02.9 ACUTE PHARYNGITIS, UNSPECIFIED ETIOLOGY: Primary | ICD-10-CM

## 2024-05-15 PROCEDURE — 3074F SYST BP LT 130 MM HG: CPT | Performed by: INTERNAL MEDICINE

## 2024-05-15 PROCEDURE — 99213 OFFICE O/P EST LOW 20 MIN: CPT | Performed by: INTERNAL MEDICINE

## 2024-05-15 PROCEDURE — 1160F RVW MEDS BY RX/DR IN RCRD: CPT | Performed by: INTERNAL MEDICINE

## 2024-05-15 PROCEDURE — 3079F DIAST BP 80-89 MM HG: CPT | Performed by: INTERNAL MEDICINE

## 2024-05-15 PROCEDURE — 1159F MED LIST DOCD IN RCRD: CPT | Performed by: INTERNAL MEDICINE

## 2024-05-15 PROCEDURE — 3008F BODY MASS INDEX DOCD: CPT | Performed by: INTERNAL MEDICINE

## 2024-05-15 PROCEDURE — 1126F AMNT PAIN NOTED NONE PRSNT: CPT | Performed by: INTERNAL MEDICINE

## 2024-05-15 RX ORDER — BENZONATATE 200 MG/1
200 CAPSULE ORAL 3 TIMES DAILY PRN
Qty: 40 CAPSULE | Refills: 1 | Status: SHIPPED | OUTPATIENT
Start: 2024-05-15 | End: 2024-05-15

## 2024-05-15 RX ORDER — AZITHROMYCIN 250 MG/1
TABLET, FILM COATED ORAL
Qty: 6 TABLET | Refills: 0 | Status: SHIPPED | OUTPATIENT
Start: 2024-05-15 | End: 2024-05-20

## 2024-05-15 RX ORDER — AZITHROMYCIN 250 MG/1
TABLET, FILM COATED ORAL
Qty: 6 TABLET | Refills: 0 | Status: SHIPPED | OUTPATIENT
Start: 2024-05-15 | End: 2024-05-15

## 2024-05-15 RX ORDER — BENZONATATE 200 MG/1
200 CAPSULE ORAL 3 TIMES DAILY PRN
Qty: 40 CAPSULE | Refills: 1 | Status: SHIPPED | OUTPATIENT
Start: 2024-05-15

## 2024-05-15 NOTE — PROGRESS NOTES
Freddie Bautista is a 78 year old male.  Chief Complaint   Patient presents with    Cough     HPI:    Patient presented today for sick visit. He states that he has been coughing since last one week. Cough is mostly productive, now has some sore throat. No fever. No other complains. No recent travel, no sick contacts.   Current Outpatient Medications   Medication Sig Dispense Refill    azithromycin 250 MG Oral Tab Take 2 tablets (500 mg total) by mouth daily for 1 day, THEN 1 tablet (250 mg total) daily for 4 days. 6 tablet 0    benzonatate 200 MG Oral Cap Take 1 capsule (200 mg total) by mouth 3 (three) times daily as needed for cough. 40 capsule 1    levothyroxine 100 MCG Oral Tab Take 1 tablet (100 mcg total) by mouth daily. 90 tablet 3    carBAMazepine  MG Oral Tablet 12 Hr Take 1 tablet (400 mg total) by mouth 3 (three) times daily. 270 tablet 1    XARELTO 20 MG Oral Tab Take 1 tablet (20 mg total) by mouth daily. Resume on 5/14 at night 90 tablet 1    carvedilol 3.125 MG Oral Tab Take 1 tablet (3.125 mg total) by mouth 2 (two) times daily with meals. 60 tablet 5    sacubitril-valsartan 24-26 MG Oral Tab Take 1 tablet by mouth 2 (two) times daily.      Magnesium Oxide 400 (240 Mg) MG Oral Tab Take 1 tablet (400 mg total) by mouth daily.      atorvastatin 80 MG Oral Tab Take 1 tablet (80 mg total) by mouth nightly.      ascorbic acid 1000 MG Oral Tab Take 1 tablet (1,000 mg total) by mouth daily. 30 tablet 0    Vitamin D3, Cholecalciferol, 25 MCG Oral Tab Take 2 tablets (2,000 Units total) by mouth daily. 30 tablet 0    Multiple Vitamins-Minerals (MERLIN MULTIVITAMIN FOR MEN) Oral Tab Take 1 tablet by mouth daily.      zinc sulfate 220 (50 Zn) MG Oral Cap Take 50 mg by mouth daily.      Sennosides-Docusate Sodium (STOOL SOFTENER/LAXATIVE OR) Take by mouth.      aspirin 81 MG Oral Chew Tab Chew by mouth nightly.        Glucosamine-Chondroit-Vit C-Mn (GLUCOSAMINE 1500 COMPLEX) Oral Cap Take 1 tablet by mouth  daily. 1200 mg daily       gabapentin 100 MG Oral Cap Start with 1 pill TID for 1 week, then 2 pills TID for another week, then 3 pills TID (Patient not taking: Reported on 2024) 270 capsule 5      Past Medical History:    Allergic rhinitis    Atherosclerosis of coronary artery    Atrial fibrillation (HCC)    Atrial flutter (HCC)    BCC (basal cell carcinoma of skin)    left side neck    BCC (basal cell carcinoma)    Left chest    Benign prostatic hypertrophy    Closed fracture of left distal radius    Colon adenoma    Coronary artery disease    Per N coronary stents    Disorder of thyroid    Essential hypertension    Extrinsic asthma, unspecified    H/O hernia repair    Hernia, inguinal, bilateral    Herniated disc    Herniated disc    High blood pressure    High cholesterol    Hyperlipidemia    Inguinal hernia recurrent unilateral    Pulmonary hypertension (HCC)    Pulmonary hypertension (HCC)    S/P ablation of atrial flutter    Sleep apnea    Thyroid nodule    Per NG: surgery x2     Thyroid nodule    TN (trigeminal neuralgia)    Per NG: Anti-seizure medication    TN (trigeminal neuralgia)      Past Surgical History:   Procedure Laterality Date    Angioplasty (coronary)      Cabg      CABG and aortic valve replacement     Cardioversion      Cath ablation  2019    for a-flutter in [place]    Colonoscopy      Colonoscopy  2019    Colonoscopy N/A 2019    Procedure: COLONOSCOPY;  Surgeon: River Zuniga MD;  Location: [facility] ENDOSCOPY    Ep cardioversion 1x  8/4/2021         Ep cardioversion 1x  10/18/2021         Ep pulmonary vein/a-fib ablation  10/6/2021         Exc skin benig 2.1-3cm remaindr body  08/10/2022    Hernia surgery      Other surgical history      trigeminal neuralgia surgery    Other surgical history      partial thyroidectomy for thyroid nodule    Replace aortic valve w/byp      Repr cmpl wnd head,fac,hand 2.6-7.5  08/10/2022    Tonsillectomy        Social  History:  Social History     Socioeconomic History    Marital status:    Tobacco Use    Smoking status: Former     Current packs/day: 0.00     Average packs/day: 0.3 packs/day for 2.0 years (0.5 ttl pk-yrs)     Types: Cigarettes     Start date: 10/5/1967     Quit date: 10/5/1969     Years since quittin.6     Passive exposure: Past    Smokeless tobacco: Never   Vaping Use    Vaping status: Never Used   Substance and Sexual Activity    Alcohol use: Yes     Alcohol/week: 0.8 standard drinks of alcohol     Types: 1 Cans of beer per week     Comment: Occasionally    Drug use: Never   Other Topics Concern    Pt has a pacemaker Yes    Pt has a defibrillator Yes    Reaction to local anesthetic No     Social Determinants of Health     Financial Resource Strain: Low Risk  (8/10/2021)    Received from [facility]    Overall Financial Resource Strain (CARDIA)     Difficulty of Paying Living Expenses: Not hard at all   Transportation Needs: No Transportation Needs (8/10/2021)    Received from [facility]    PRAPARE - Transportation     Lack of Transportation (Medical): No     Lack of Transportation (Non-Medical): No      Family History   Problem Relation Age of Onset    Prostate Cancer Father     Cancer Father         prostate cancer    Stroke Mother         Per NG: TIA's    Other (Other) Mother         vascular dementia    Skin cancer Maternal Grandmother     Cancer Maternal Grandmother     Kidney Disease Maternal Grandfather         Per NG kidney removal    Skin cancer Maternal Grandfather     Prostate Cancer Paternal Grandfather     Cancer Sister         unknown cancer    Other (Other) Sister         eczema    Heart Disease Other         PEr NG: Family history of CAD      Allergies   Allergen Reactions    Cat Hair Extract      Other reaction(s): CAT HAIR STD EXTRACT    Sulfa Antibiotics      Other reaction(s): SULFA (SULFONAMIDE ANTIBIOTICS)         REVIEW OF SYSTEMS:   Review of Systems   Review of Systems   Constitutional: Negative for activity change, appetite change and fever.   HENT: Negative for congestion and voice change.    Respiratory: Negative for cough and shortness of breath.    Cardiovascular: Negative for chest pain.   Gastrointestinal: Negative for abdominal distention, abdominal pain and vomiting.   Genitourinary: Negative for hematuria.   Skin: Negative for wound.   Psychiatric/Behavioral: Negative for behavioral problems.   Wt Readings from Last 5 Encounters:   05/15/24 187 lb 12.8 oz (85.2 kg)   04/18/24 183 lb (83 kg)   03/01/24 192 lb 1.6 oz (87.1 kg)   12/04/23 190 lb (86.2 kg)   09/06/23 194 lb 12.8 oz (88.4 kg)     Body mass index is 29.41 kg/m².      EXAM:   /83 (BP Location: Left arm, Patient Position: Sitting, Cuff Size: large)   Pulse 70   Temp 97.5 °F (36.4 °C) (Temporal)   Ht 5' 7\" (1.702 m)   Wt 187 lb 12.8 oz (85.2 kg)   SpO2 97%   BMI 29.41 kg/m²   Physical Exam   Constitutional:       Appearance: Normal appearance.   HENT:      Head: Normocephalic.   Eyes:      Conjunctiva/sclera: Conjunctivae normal.   Cardiovascular:      Rate and Rhythm: Normal rate and regular rhythm.      Heart sounds: Normal heart sounds. No murmur heard.  Pulmonary:      Effort: Pulmonary effort is normal.      Breath sounds: Normal breath sounds. No rhonchi or rales.   Abdominal:      General: Bowel sounds are normal.      Palpations: Abdomen is soft.      Tenderness: There is no abdominal tenderness.   Musculoskeletal:      Cervical back: Neck supple.      Right lower leg: No edema.      Left lower leg: No edema.   Skin:     General: Skin is warm and dry.   Neurological:      General: No focal deficit present.      Mental Status: He is alert and oriented to person, place, and time. Mental status is at baseline.   Psychiatric:         Mood and Affect: Mood normal.         Behavior: Behavior normal.       ASSESSMENT AND PLAN:   1. Acute  pharyngitis, unspecified etiology  - start Z pack   - tessalon for cough  - Tylenol as needed for pain   - avoid crowds  - increase hydration/rest      The patient indicates understanding of these issues and agrees to the plan.    Amirah Banda MD

## 2024-05-21 ENCOUNTER — OFFICE VISIT (OUTPATIENT)
Dept: PAIN CLINIC | Facility: CLINIC | Age: 79
End: 2024-05-21

## 2024-05-21 DIAGNOSIS — G50.0 TRIGEMINAL NEURALGIA: Primary | ICD-10-CM

## 2024-05-21 PROBLEM — J44.89 ASTHMA WITH COPD (CHRONIC OBSTRUCTIVE PULMONARY DISEASE) (HCC): Chronic | Status: ACTIVE | Noted: 2024-05-21

## 2024-05-21 NOTE — PROGRESS NOTES
Freddie Bautista is a 78 year old male No chief complaint on file..     Chief Complaint:    Trigeminal neuralgia      Self reported health status:      Patient reported severity of symptom(s) over the last 24 hours  With zero reporting no symptoms and 10 reporting the worst severity     Symptom 1: 3     Response to last TX 5/14/2024: Freddie reports his pain was less, felt better and has lasted. The pain has dropped to 3 out of 10. He feels the heat helps.     Response to last TX 5/9/2024:  Freddie reports before starting acupuncture his pain was a 6-7 and could get up to a 9-10. Since starting acupuncture is pain has dropped to a solid 5 out of 10. Yesterday was a tough day, but today he is feeling good.     HPI 4/23/2024: Freddie Bautista is a 78 year old male with a PMH of CAD s/p 2 stents, bovine heart valve on Xarelto. In 1989 decompression surgery, [place] with Dr. Ángel Preciado, [facility]. He stated that immediately following surgery he felt relief. He was off meds for 3.5 years. His neuralgia pain comes and goes in waves and will go into boughts of remission. It is annoying when the pain would come back. He reports wind blowing on his face would cause severe pain. When he tries to talk, his right eye starts watering uncontrollably. He has had acupuncture before and has help manage pain. He has a surgery consult in June and is hoping acupuncture can manage his pain till then.      Objective (Pulse, Tongue, Vitals):     Tongue: Cracked , red     Pulse: Moderate     TCM Diagnosis: Plan of Care: Channel Obstruction SJ, GB     Acupuncture Therapy:     Set 1: Left: Mu Stevens, Mu eliud, DEEP-8, (2), HT-7. HT-6      Set 2: Bilateral: Louisa Epperson, Sandeep louis     Set 3: Left: SJ-14, SJ-13, Aleisha rivas     Patient Goals: To reduce pain by 50% and manage it till her consult in June.     Face Time: 26 minutes  Total Time: 50  minutes       Treatment performed by Monica BRIGHT LAc. at [facility].    No follow-ups on file.    Monica Corona L.AC  5/21/2024  3:49 PM

## 2024-05-23 ENCOUNTER — OFFICE VISIT (OUTPATIENT)
Dept: PAIN CLINIC | Facility: CLINIC | Age: 79
End: 2024-05-23

## 2024-05-23 DIAGNOSIS — G50.0 TRIGEMINAL NEURALGIA: Primary | ICD-10-CM

## 2024-05-23 NOTE — PROGRESS NOTES
Freddie Bautista is a 78 year old male No chief complaint on file..     Chief Complaint:    Trigeminal neuralgia      Self reported health status:      Patient reported severity of symptom(s) over the last 24 hours  With zero reporting no symptoms and 10 reporting the worst severity     Symptom 1: 3     Response to last TX 5/14/2024: Freddie reports his pain was less, felt better and has lasted. He felt good after the treatment. The pain has dropped to 3 out of 10 and is holding there. He is able to eat.     Response to last TX 5/9/2024:  Freddie reports before starting acupuncture his pain was a 6-7 and could get up to a 9-10. Since starting acupuncture is pain has dropped to a solid 5 out of 10. Yesterday was a tough day, but today he is feeling good.     HPI 4/23/2024: Freddie Bautista is a 78 year old male with a PMH of CAD s/p 2 stents, bovine heart valve on Xarelto. In 1989 decompression surgery, [place] with Dr. Ángel Preciado, [facility]. He stated that immediately following surgery he felt relief. He was off meds for 3.5 years. His neuralgia pain comes and goes in waves and will go into boughts of remission. It is annoying when the pain would come back. He reports wind blowing on his face would cause severe pain. When he tries to talk, his right eye starts watering uncontrollably. He has had acupuncture before and has help manage pain. He has a surgery consult in June and is hoping acupuncture can manage his pain till then.      Objective (Pulse, Tongue, Vitals):     Tongue: Cracked , red     Pulse: Moderate     TCM Diagnosis: Plan of Care: Channel Obstruction SJ, GB     Acupuncture Therapy:     Set 1: Left: Ross Stevens, Mu eliud, DEEP-8, LI-11, LI-10, LI12     Set 2: Bilateral: Louisa Epperson, Sandeep louis     Set 3: Left: SJ-14, SJ-13, Aleisha rivas     Patient Goals: To reduce pain by 50% and manage it till her consult in June.     Face Time: 26 minutes  Total Time: 50  minutes       Treatment performed  by Monica BRIGHT LAc. at [facility].    No follow-ups on file.    Monica Corona L.AC  5/23/2024  5:25 PM

## 2024-05-28 ENCOUNTER — OFFICE VISIT (OUTPATIENT)
Dept: PAIN CLINIC | Facility: CLINIC | Age: 79
End: 2024-05-28

## 2024-05-28 DIAGNOSIS — G50.0 TRIGEMINAL NEURALGIA: Primary | ICD-10-CM

## 2024-05-28 PROCEDURE — 97810 ACUP 1/> WO ESTIM 1ST 15 MIN: CPT | Performed by: ACUPUNCTURIST

## 2024-05-28 NOTE — PROGRESS NOTES
Freddie Bautista is a 78 year old male No chief complaint on file..     Chief Complaint:    Trigeminal neuralgia      Self reported health status:      Patient reported severity of symptom(s) over the last 24 hours  With zero reporting no symptoms and 10 reporting the worst severity     Symptom 1: 2-3;      Response to last TX 5/28/2024: Freddie reports after last treatment he had relief from his pain and lasted till Sunday. On Monday his pain re-started up again.     Response to last TX 5/14/2024: Freddie reports his pain was less, felt better and has lasted. He felt good after the treatment. The pain has dropped to 3 out of 10 and is holding there. He is able to eat.     HPI 4/23/2024: Freddie Bautista is a 78 year old male with a PMH of CAD s/p 2 stents, bovine heart valve on Xarelto. In 1989 decompression surgery, [place] with Dr. Ángel Preciado, [facility]. He stated that immediately following surgery he felt relief. He was off meds for 3.5 years. His neuralgia pain comes and goes in waves and will go into boughts of remission. It is annoying when the pain would come back. He reports wind blowing on his face would cause severe pain. When he tries to talk, his right eye starts watering uncontrollably. He has had acupuncture before and has help manage pain. He has a surgery consult in June and is hoping acupuncture can manage his pain till then.      Objective (Pulse, Tongue, Vitals):     Tongue: Cracked down the center , red     Pulse: Moderate     TCM Diagnosis: Plan of Care: Channel Obstruction SJ, GB     Acupuncture Therapy:     Set 1: Left: Ross Stevens, DEEP-8, LI-11, LI-10, LI12     Set 2: Bilateral: Louisa Epperson, Sandeep louis, DEEP-3     Set 3: Left:  Aleisha rivas     Patient Goals: To reduce pain by 50% and manage it till her consult in June.     Face Time: 26 minutes  Total Time: 50  minutes       Treatment performed by Monica [facility]IGHT LAc. at [facility].    No follow-ups on  file.    Monica Corona L.AC  5/28/2024  1:39 PM

## 2024-05-30 ENCOUNTER — OFFICE VISIT (OUTPATIENT)
Dept: PAIN CLINIC | Facility: CLINIC | Age: 79
End: 2024-05-30

## 2024-05-30 DIAGNOSIS — G50.0 TRIGEMINAL NEURALGIA: Primary | ICD-10-CM

## 2024-05-31 NOTE — PROGRESS NOTES
Freddie Bautista is a 78 year old male No chief complaint on file..     Chief Complaint:    Trigeminal neuralgia      Self reported health status:      Patient reported severity of symptom(s) over the last 24 hours  With zero reporting no symptoms and 10 reporting the worst severity     Symptom 1: 3;      Response to last TX 5/28/2024: Freddie reports it was a tough day after the treatment. The  pain increase  back to a 5 out of 10, then the intensity slowly decreased. Currently, his pain has decreased to 3 out of 10. Next week will be his last Acupuncture treatment , he will be getting an MRI and discuss his MRI result with Neurologist.       Response to last TX 5/28/2024: Freddie reports after last treatment he had relief from his pain and lasted till Sunday. On Monday his pain re-started up again.     HPI 4/23/2024: Freddie Bautista is a 78 year old male with a PMH of CAD s/p 2 stents, bovine heart valve on Xarelto. In 1989 decompression surgery, [place] with Dr. Ángel Preciado, [facility]. He stated that immediately following surgery he felt relief. He was off meds for 3.5 years. His neuralgia pain comes and goes in waves and will go into boughts of remission. It is annoying when the pain would come back. He reports wind blowing on his face would cause severe pain. When he tries to talk, his right eye starts watering uncontrollably. He has had acupuncture before and has help manage pain. He has a surgery consult in June and is hoping acupuncture can manage his pain till then.      Objective (Pulse, Tongue, Vitals):     Tongue: Cracked down the center , red     Pulse: Moderate     TCM Diagnosis: Plan of Care: Channel Obstruction SJ, GB     Acupuncture Therapy:       Set 1: Bilateral: Louisa Epperson, Sandeep louis, DEEP-3, KID-3, SP-6, LI-4     Set 2: Right\" Ross galvan, Aleisha rivas    Set 3: Left: LI-15, LI-14, LI-13, LI-12, LI-11     Patient Goals: To reduce pain by 50% and manage it till her consult in June.      Face Time: 26 minutes  Total Time: 50  minutes     Treatment performed by Monica BRIGHT LAc. at [facility].    No follow-ups on file.    Monica Corona L.AC  5/31/2024  9:45 AM

## 2024-06-04 ENCOUNTER — OFFICE VISIT (OUTPATIENT)
Dept: PAIN CLINIC | Facility: CLINIC | Age: 79
End: 2024-06-04

## 2024-06-04 DIAGNOSIS — G50.0 TRIGEMINAL NEURALGIA: Primary | ICD-10-CM

## 2024-06-04 NOTE — PROGRESS NOTES
Freddie Bautista is a 78 year old male No chief complaint on file..     Chief Complaint:    Trigeminal neuralgia      Self reported health status:      Patient reported severity of symptom(s) over the last 24 hours  With zero reporting no symptoms and 10 reporting the worst severity     Symptom 1: 2      Response to last TX 5/30/2024: Freddie reports there was a significant difference, the \"pain eased up\". His pain dropped to 2 out of 10. He had 4 days of relief and did some bike riding.    Response to last TX 5/28/2024: Freddie reports it was a tough day after the treatment. The  pain increase  back to a 5 out of 10, then the intensity slowly decreased. Currently, his pain has decreased to 3 out of 10. Next week will be his last Acupuncture treatment , he will be getting an MRI and discuss his MRI result with Neurologist.      HPI 4/23/2024: Freddie Bautista is a 78 year old male with a PMH of CAD s/p 2 stents, bovine heart valve on Xarelto. In 1989 decompression surgery, [place] with Dr. Ángel Preciado, [facility]. He stated that immediately following surgery he felt relief. He was off meds for 3.5 years. His neuralgia pain comes and goes in waves and will go into boughts of remission. It is annoying when the pain would come back. He reports wind blowing on his face would cause severe pain. When he tries to talk, his right eye starts watering uncontrollably. He has had acupuncture before and has help manage pain. He has a surgery consult in June and is hoping acupuncture can manage his pain till then.      Objective (Pulse, Tongue, Vitals):     Tongue: Cracked down the center , red     Pulse: Moderate     TCM Diagnosis: Plan of Care: Channel Obstruction SJ, GB     Acupuncture Therapy:     Set 1: Bilateral: Louisa Epperson, [facility] men, DEEP-3, KID-3    Set 2: Bilateral: SP-6, LI-4    Set 3: LI-3, SJ-3, DEEP-8     Set 4: Right: Aleisha Page     Patient Goals: To reduce pain by 50% and manage it till  her consult in June.     Face Time: 26 minutes  Total Time: 50  minutes    Treatment performed by Monica BRIGHT LAc. at [facility].    No follow-ups on file.    Monica Corona L.AC  6/4/2024  2:54 PM

## 2024-06-06 ENCOUNTER — OFFICE VISIT (OUTPATIENT)
Dept: PAIN CLINIC | Facility: CLINIC | Age: 79
End: 2024-06-06

## 2024-06-06 DIAGNOSIS — G50.0 TRIGEMINAL NEURALGIA: Primary | ICD-10-CM

## 2024-06-07 NOTE — PROGRESS NOTES
Freddie Bautista is a 78 year old male No chief complaint on file..     Chief Complaint:    Trigeminal neuralgia      Self reported health status:      Patient reported severity of symptom(s) over the last 24 hours  With zero reporting no symptoms and 10 reporting the worst severity     Symptom 1: 2      Response to last TX 5/30/2024: Freddie states \"pain is fair\". His pain maintained at 2 out of 10. I suggested if he has the time and financially able to he should come in once or twice a month to prevent the pain from turning back to a 5-6 out of 10 otherwise this is his last treatment.     Response to last TX 5/30/2024: Freddie reports there was a significant difference, the \"pain eased up\". His pain dropped to 2 out of 10. He had 4 days of relief and did some bike riding.     HPI 4/23/2024: Freddie Bautista is a 78 year old male with a PMH of CAD s/p 2 stents, bovine heart valve on Xarelto. In 1989 decompression surgery, [place] with Dr. Ángel Preciado, [facility]. He stated that immediately following surgery he felt relief. He was off meds for 3.5 years. His neuralgia pain comes and goes in waves and will go into boughts of remission. It is annoying when the pain would come back. He reports wind blowing on his face would cause severe pain. When he tries to talk, his right eye starts watering uncontrollably. He has had acupuncture before and has help manage pain. He has a surgery consult in June and is hoping acupuncture can manage his pain till then.      Objective (Pulse, Tongue, Vitals):     Tongue: Cracked down the center , red     Pulse: Moderate     TCM Diagnosis: Plan of Care: Channel Obstruction SJ, GB     Acupuncture Therapy:     Set 1: Bilateral: Louisa Epperson, [employer] men, DEEP-3, KID-3     Set 2: Bilateral: SP-6, LI-4     Set 3: LI-3, SJ-3, DEEP-8(2)     Set 4: Right: Aleisha Page     Patient Goals: To reduce pain by 50% and manage it till her consult in June.     Face Time: 26  minutes  Total Time: 50  minutes  Treatment performed by Monica BRIGHT LAc. at [facility].    No follow-ups on file.    Monica Corona L.AC  6/7/2024  9:01 AM

## 2024-06-12 ENCOUNTER — TELEPHONE (OUTPATIENT)
Dept: INTEGRATIVE MEDICINE | Facility: CLINIC | Age: 79
End: 2024-06-12

## 2024-06-18 ENCOUNTER — HOSPITAL ENCOUNTER (OUTPATIENT)
Dept: MRI IMAGING | Facility: HOSPITAL | Age: 79
Discharge: HOME | End: 2024-06-18
Attending: NURSE PRACTITIONER

## 2024-06-18 DIAGNOSIS — G50.0 TRIGEMINAL NEURALGIA: ICD-10-CM

## 2024-06-20 ENCOUNTER — HOSPITAL ENCOUNTER (OUTPATIENT)
Dept: MRI IMAGING | Facility: HOSPITAL | Age: 79
Discharge: HOME | End: 2024-06-20
Attending: NURSE PRACTITIONER

## 2024-06-20 DIAGNOSIS — G50.0 TRIGEMINAL NEURALGIA: ICD-10-CM

## 2024-06-20 PROCEDURE — 70546 MR ANGIOGRAPH HEAD W/O&W/DYE: CPT | Performed by: NURSE PRACTITIONER

## 2024-06-20 PROCEDURE — A9575 INJ GADOTERATE MEGLUMI 0.1ML: HCPCS | Performed by: NURSE PRACTITIONER

## 2024-06-20 PROCEDURE — 70553 MRI BRAIN STEM W/O & W/DYE: CPT | Performed by: NURSE PRACTITIONER

## 2024-06-20 RX ORDER — GADOTERATE MEGLUMINE 376.9 MG/ML
20 INJECTION INTRAVENOUS
Status: COMPLETED | OUTPATIENT
Start: 2024-06-20 | End: 2024-06-20

## 2024-06-20 RX ADMIN — GADOTERATE MEGLUMINE 17 ML: 376.9 INJECTION INTRAVENOUS at 15:19:00

## 2024-06-20 NOTE — IMAGING NOTE
1322  Patient to MRI 3T holding room. [employer] pacemaker representative present remotely to program patient's pacemaker / ICD to MRI SureScan mode. VSS.  VOO 80 bpm    1345  MRI SCAN BEGINS.  VS  HR 80  /102  SPO2 95% ON RA.     1355  VS  HR 79  /94  SPO2 93%    1405  VS  HR 79  /99  SPO2 95%    1415  VS  HR 80  /94  SPO2 94%    1425  VS  HR 80  /106  SPO2 96%    1435  VS  HR 80  /92  SPO2 95%    1439  MRI SCAN COMPLETE. Patient to MRI holding room. [employer] pacemaker representative present remotely to reprogram patient's pacemaker / ICD to original settings.

## 2024-07-11 ENCOUNTER — OFFICE VISIT (OUTPATIENT)
Dept: SURGERY | Facility: CLINIC | Age: 79
End: 2024-07-11
Payer: COMMERCIAL

## 2024-07-11 VITALS
BODY MASS INDEX: 29.35 KG/M2 | SYSTOLIC BLOOD PRESSURE: 120 MMHG | DIASTOLIC BLOOD PRESSURE: 82 MMHG | HEART RATE: 75 BPM | HEIGHT: 67 IN | WEIGHT: 187 LBS

## 2024-07-11 DIAGNOSIS — G50.0 TRIGEMINAL NEURALGIA: Primary | ICD-10-CM

## 2024-07-11 PROCEDURE — 1160F RVW MEDS BY RX/DR IN RCRD: CPT | Performed by: NEUROLOGICAL SURGERY

## 2024-07-11 PROCEDURE — 1159F MED LIST DOCD IN RCRD: CPT | Performed by: NEUROLOGICAL SURGERY

## 2024-07-11 PROCEDURE — 99214 OFFICE O/P EST MOD 30 MIN: CPT | Performed by: NEUROLOGICAL SURGERY

## 2024-07-11 PROCEDURE — 3008F BODY MASS INDEX DOCD: CPT | Performed by: NEUROLOGICAL SURGERY

## 2024-07-11 PROCEDURE — 3079F DIAST BP 80-89 MM HG: CPT | Performed by: NEUROLOGICAL SURGERY

## 2024-07-11 PROCEDURE — 3074F SYST BP LT 130 MM HG: CPT | Performed by: NEUROLOGICAL SURGERY

## 2024-07-11 NOTE — PATIENT INSTRUCTIONS
Refill policies:    Allow 2-3 business days for refills; controlled substances may take longer.  Contact your pharmacy at least 5 days prior to running out of medication and have them send an electronic request or submit request through the “request refill” option in your [employer] account.  Refills are not addressed on weekends; covering physicians do not authorize routine medications on weekends.  No narcotics or controlled substances are refilled after noon on Fridays or by on call physicians.  By law, narcotics must be electronically prescribed.  A 30 day supply with no refills is the maximum allowed.  If your prescription is due for a refill, you may be due for a follow up appointment.  To best provide you care, patients receiving routine medications need to be seen at least once a year.  Patients receiving narcotic/controlled substance medications need to be seen at least once every 3 months.  In the event that your preferred pharmacy does not have the requested medication in stock (e.g. Backordered), it is your responsibility to find another pharmacy that has the requested medication available.  We will gladly send a new prescription to that pharmacy at your request.    Scheduling Tests:    If your physician has ordered radiology tests such as MRI or CT scans, please contact Central Scheduling at [phone] right away to schedule the test.  Once scheduled, the [facility] Centralized Referral Team will work with your insurance carrier to obtain pre-certification or prior authorization.  Depending on your insurance carrier, approval may take 3-10 days.  It is highly recommended patients assure they have received an authorization before having a test performed.  If test is done without insurance authorization, patient may be responsible for the entire amount billed.      Precertification and Prior Authorizations:  If your physician has recommended that you have a procedure or additional testing performed the [facility]  Centralized Referral Team will contact your insurance carrier to obtain pre-certification or prior authorization.    You are strongly encouraged to contact your insurance carrier to verify that your procedure/test has been approved and is a COVERED benefit.  Although the [facility] Centralized Referral Team does its due diligence, the insurance carrier gives the disclaimer that \"Although the procedure is authorized, this does not guarantee payment.\"    Ultimately the patient is responsible for payment.   Thank you for your understanding in this matter.  Paperwork Completion:  If you require FMLA or disability paperwork for your recovery, please make sure to either drop it off or have it faxed to our office at [phone]. Be sure the form has your name and date of birth on it.  The form will be faxed to our Forms Department and they will complete it for you.  There is a 25$ fee for all forms that need to be filled out.  Please be aware there is a 10-14 day turnaround time.  You will need to sign a release of information (LEBRON) form if your paperwork does not come with one.  You may call the Forms Department with any questions at [phone].  Their fax number is [phone].

## 2024-07-12 NOTE — PROGRESS NOTES
[facility]  Neurological Surgery Clinic Note    Freddie Bautista  1945  [id number]  PCP: Tomas Beaulieu MD    REASON FOR VISIT:  Recurrent right-sided trigeminal neuralgia    HISTORY OF PRESENT ILLNESS:  Freddie Bautista is a 78 year old male who presents to clinic for evaluation of recurrent right-sided trigeminal neuralgia after a microvascular decompression in the 80s at [facility] by Dr. Ángel Preciado.  He reports symptom recurrence in 2024 and at that time began again on carbamazepine and has had a significant reduction in his symptoms since that time.  Clinically he is doing well at this time.  MRI/MRA brain 2024 demonstrates calcified area of scar tissue in the right cerebellopontine angle at the prior surgical site.    PAST MEDICAL HISTORY:  Past Medical History:    Allergic rhinitis    Asthma with COPD (chronic obstructive pulmonary disease) (HCC)    Atherosclerosis of coronary artery    Atrial fibrillation (HCC)    Atrial flutter (HCC)    BCC (basal cell carcinoma of skin)    left side neck    BCC (basal cell carcinoma)    Left chest    Benign prostatic hypertrophy    Closed fracture of left distal radius    Colon adenoma    Coronary artery disease    Per N coronary stents    Disorder of thyroid    Essential hypertension    Extrinsic asthma, unspecified    H/O hernia repair    Hernia, inguinal, bilateral    Herniated disc    Herniated disc    High blood pressure    High cholesterol    Hyperlipidemia    Inguinal hernia recurrent unilateral    Pulmonary hypertension (HCC)    Pulmonary hypertension (HCC)    S/P ablation of atrial flutter    Sleep apnea    Thyroid nodule    Per NG: surgery x2     Thyroid nodule    TN (trigeminal neuralgia)    Per NG: Anti-seizure medication    TN (trigeminal neuralgia)       PAST SURGICAL HISTORY:  Past Surgical History:   Procedure Laterality Date    Angioplasty (coronary)      Cabg      CABG and  aortic valve replacement 2014    Cardioversion      Cath ablation  03/2019    for a-flutter in [place]    Colonoscopy      Colonoscopy  11/18/2019    Colonoscopy N/A 11/18/2019    Procedure: COLONOSCOPY;  Surgeon: River Zuniga MD;  [facility]: [facility] ENDOSCOPY    Ep cardioversion 1x  8/4/2021         Ep cardioversion 1x  10/18/2021         Ep pulmonary vein/a-fib ablation  10/6/2021         Exc skin benig 2.1-3cm remaindr body  08/10/2022    Hernia surgery      Other surgical history      trigeminal neuralgia surgery    Other surgical history      partial thyroidectomy for thyroid nodule    Replace aortic valve w/byp      Repr cmpl wnd head,fac,hand 2.6-7.5  08/10/2022    Tonsillectomy         FAMILY HISTORY:  family history includes Cancer in his father, maternal grandmother, and sister; Heart Disease in an other family member; Kidney Disease in his maternal grandfather; Other in his mother and sister; Prostate Cancer in his father and paternal grandfather; Skin cancer in his maternal grandfather and maternal grandmother; Stroke in his mother.    SOCIAL HISTORY:   reports that he quit smoking about 54 years ago. His smoking use included cigarettes. He started smoking about 56 years ago. He has a 0.5 pack-year smoking history. He has been exposed to tobacco smoke. He has never used smokeless tobacco. He reports current alcohol use of about 0.8 standard drinks of alcohol per week. He reports that he does not use drugs.    ALLERGIES:  Allergies   Allergen Reactions    Cat Hair Extract      Other reaction(s): CAT HAIR STD EXTRACT    Sulfa Antibiotics      Other reaction(s): SULFA (SULFONAMIDE ANTIBIOTICS)       MEDICATIONS:  Current Outpatient Medications on File Prior to Visit   Medication Sig Dispense Refill    benzonatate 200 MG Oral Cap Take 1 capsule (200 mg total) by mouth 3 (three) times daily as needed for cough. 40 capsule 1    levothyroxine 100 MCG Oral Tab Take 1 tablet (100 mcg total) by mouth daily. 90  tablet 3    carBAMazepine  MG Oral Tablet 12 Hr Take 1 tablet (400 mg total) by mouth 3 (three) times daily. 270 tablet 1    gabapentin 100 MG Oral Cap Start with 1 pill TID for 1 week, then 2 pills TID for another week, then 3 pills TID (Patient not taking: Reported on 4/18/2024) 270 capsule 5    XARELTO 20 MG Oral Tab Take 1 tablet (20 mg total) by mouth daily. Resume on 5/14 at night 90 tablet 1    carvedilol 3.125 MG Oral Tab Take 1 tablet (3.125 mg total) by mouth 2 (two) times daily with meals. 60 tablet 5    sacubitril-valsartan 24-26 MG Oral Tab Take 1 tablet by mouth 2 (two) times daily.      Magnesium Oxide 400 (240 Mg) MG Oral Tab Take 1 tablet (400 mg total) by mouth daily.      atorvastatin 80 MG Oral Tab Take 1 tablet (80 mg total) by mouth nightly.      ascorbic acid 1000 MG Oral Tab Take 1 tablet (1,000 mg total) by mouth daily. 30 tablet 0    Vitamin D3, Cholecalciferol, 25 MCG Oral Tab Take 2 tablets (2,000 Units total) by mouth daily. 30 tablet 0    Multiple Vitamins-Minerals (MERLIN MULTIVITAMIN FOR MEN) Oral Tab Take 1 tablet by mouth daily.      zinc sulfate 220 (50 Zn) MG Oral Cap Take 50 mg by mouth daily.      Sennosides-Docusate Sodium (STOOL SOFTENER/LAXATIVE OR) Take by mouth.      aspirin 81 MG Oral Chew Tab Chew by mouth nightly.        Glucosamine-Chondroit-Vit C-Mn (GLUCOSAMINE 1500 COMPLEX) Oral Cap Take 1 tablet by mouth daily. 1200 mg daily        No current facility-administered medications on file prior to visit.       REVIEW OF SYSTEMS:  A 10-point system was reviewed.  Pertinent positives and negatives are noted in HPI.      PHYSICAL EXAMINATION:  VITAL SIGNS: /82 (BP Location: Right arm, Patient Position: Sitting, Cuff Size: large)   Pulse 75   Ht 67\"   Wt 187 lb (84.8 kg)   BMI 29.29 kg/m²     A&Ox3, no acute distress  PERRL, EOMi, FS, TM  Full strength x 4, no drift  Sensation intact   Incision well-healed    ASSESSMENT:  78-year-old male with recurrence of  right-sided trigeminal neuralgia with prior microvascular decompression now controlled medications    I discussed the patient and his wife the current clinical situation and plan of care.  We discussed that given his improvement with medical management there is no need for any surgical intervention at this time.  We discussed that given his prior microvascular decompression and the imaging findings seen I would not recommend for redo microvascular decompression.  We discussed the options of a trigeminal rhizotomy as well as stereotactic radiosurgery should his symptoms recur and become intractable.  I explained that I do not personally perform these procedures but I would refer him to a colleague should that be necessary.    Plan:  Follow-up with me as needed.    Tejinder Qiu MD  Neurological Surgery  [facility] Time: 30 min including face to face time, chart review, imaging interpretation, and coordination of care

## 2024-08-02 ENCOUNTER — LAB ENCOUNTER (OUTPATIENT)
Dept: LAB | Facility: HOSPITAL | Age: 79
End: 2024-08-02
Attending: INTERNAL MEDICINE
Payer: MEDICARE

## 2024-08-02 DIAGNOSIS — I48.91 ATRIAL FIBRILLATION, UNSPECIFIED TYPE (HCC): ICD-10-CM

## 2024-08-02 LAB
INR BLD: 1.07 (ref 0.8–1.2)
PROTHROMBIN TIME: 14.6 SECONDS (ref 11.6–14.8)

## 2024-08-02 PROCEDURE — 85610 PROTHROMBIN TIME: CPT

## 2024-08-02 PROCEDURE — 36415 COLL VENOUS BLD VENIPUNCTURE: CPT

## 2024-08-05 ENCOUNTER — LAB ENCOUNTER (OUTPATIENT)
Dept: LAB | Age: 79
End: 2024-08-05
Attending: INTERNAL MEDICINE
Payer: MEDICARE

## 2024-08-05 DIAGNOSIS — I48.91 ATRIAL FIBRILLATION, UNSPECIFIED TYPE (HCC): ICD-10-CM

## 2024-08-05 LAB
INR BLD: 1.55 (ref 0.8–1.2)
PROTHROMBIN TIME: 19.6 SECONDS (ref 11.6–14.8)

## 2024-08-05 PROCEDURE — 36415 COLL VENOUS BLD VENIPUNCTURE: CPT

## 2024-08-05 PROCEDURE — 85610 PROTHROMBIN TIME: CPT

## 2024-08-08 ENCOUNTER — OFFICE VISIT (OUTPATIENT)
Dept: DERMATOLOGY CLINIC | Facility: CLINIC | Age: 79
End: 2024-08-08
Payer: COMMERCIAL

## 2024-08-08 DIAGNOSIS — B07.9 VERRUCA: ICD-10-CM

## 2024-08-08 DIAGNOSIS — L81.4 LENTIGO: ICD-10-CM

## 2024-08-08 DIAGNOSIS — Z08 ENCOUNTER FOR FOLLOW-UP SURVEILLANCE OF SKIN CANCER: ICD-10-CM

## 2024-08-08 DIAGNOSIS — D22.9 MULTIPLE NEVI: ICD-10-CM

## 2024-08-08 DIAGNOSIS — L82.1 SEBORRHEIC KERATOSES: Primary | ICD-10-CM

## 2024-08-08 DIAGNOSIS — L57.0 AK (ACTINIC KERATOSIS): ICD-10-CM
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
## 2024-08-08 DIAGNOSIS — Z85.828 ENCOUNTER FOR FOLLOW-UP SURVEILLANCE OF SKIN CANCER: ICD-10-CM

## 2024-08-08 DIAGNOSIS — D23.9 BENIGN NEOPLASM OF SKIN, UNSPECIFIED LOCATION: ICD-10-CM

## 2024-08-08 PROCEDURE — 99213 OFFICE O/P EST LOW 20 MIN: CPT | Performed by: DERMATOLOGY

## 2024-08-08 PROCEDURE — 1160F RVW MEDS BY RX/DR IN RCRD: CPT | Performed by: DERMATOLOGY

## 2024-08-08 PROCEDURE — G2211 COMPLEX E/M VISIT ADD ON: HCPCS | Performed by: DERMATOLOGY

## 2024-08-08 PROCEDURE — 1159F MED LIST DOCD IN RCRD: CPT | Performed by: DERMATOLOGY

## 2024-08-09 ENCOUNTER — LAB ENCOUNTER (OUTPATIENT)
Dept: LAB | Age: 79
End: 2024-08-09
Attending: INTERNAL MEDICINE
Payer: MEDICARE

## 2024-08-09 DIAGNOSIS — I48.91 ATRIAL FIBRILLATION, UNSPECIFIED TYPE (HCC): ICD-10-CM

## 2024-08-09 LAB
INR BLD: 2.02 (ref 0.8–1.2)
PROTHROMBIN TIME: 24.1 SECONDS (ref 11.6–14.8)

## 2024-08-09 PROCEDURE — 85610 PROTHROMBIN TIME: CPT

## 2024-08-09 PROCEDURE — 36415 COLL VENOUS BLD VENIPUNCTURE: CPT

## 2024-08-10 ENCOUNTER — HOSPITAL ENCOUNTER (EMERGENCY)
Facility: HOSPITAL | Age: 79
Discharge: ANOTHER HOSPITAL | End: 2024-08-10
Attending: EMERGENCY MEDICINE
Payer: MEDICARE

## 2024-08-10 ENCOUNTER — APPOINTMENT (OUTPATIENT)
Dept: CT IMAGING | Facility: HOSPITAL | Age: 79
End: 2024-08-10
Attending: PHYSICIAN ASSISTANT
Payer: MEDICARE

## 2024-08-10 ENCOUNTER — HOSPITAL ENCOUNTER (OUTPATIENT)
Age: 79
Discharge: ANOTHER HOSPITAL | End: 2024-08-10
Payer: MEDICARE

## 2024-08-10 VITALS
HEART RATE: 70 BPM | SYSTOLIC BLOOD PRESSURE: 147 MMHG | RESPIRATION RATE: 16 BRPM | TEMPERATURE: 98 F | OXYGEN SATURATION: 97 % | DIASTOLIC BLOOD PRESSURE: 94 MMHG

## 2024-08-10 VITALS
HEART RATE: 78 BPM | RESPIRATION RATE: 16 BRPM | SYSTOLIC BLOOD PRESSURE: 146 MMHG | TEMPERATURE: 97 F | BODY MASS INDEX: 28.72 KG/M2 | DIASTOLIC BLOOD PRESSURE: 101 MMHG | HEIGHT: 67 IN | WEIGHT: 183 LBS | OXYGEN SATURATION: 99 %

## 2024-08-10 DIAGNOSIS — K66.8 PNEUMOPERITONEUM: Primary | ICD-10-CM

## 2024-08-10 DIAGNOSIS — R10.32 ABDOMINAL PAIN, LEFT LOWER QUADRANT: ICD-10-CM

## 2024-08-10 LAB
#MXD IC: 0.5 X10ˆ3/UL (ref 0.1–1)
ALBUMIN SERPL-MCNC: 4.6 G/DL (ref 3.2–4.8)
ALBUMIN/GLOB SERPL: 1.8 {RATIO} (ref 1–2)
ALP LIVER SERPL-CCNC: 94 U/L
ALT SERPL-CCNC: 22 U/L
ANION GAP SERPL CALC-SCNC: 5 MMOL/L (ref 0–18)
AST SERPL-CCNC: 25 U/L (ref ?–34)
BASOPHILS # BLD AUTO: 0.04 X10(3) UL (ref 0–0.2)
BASOPHILS NFR BLD AUTO: 0.8 %
BILIRUB SERPL-MCNC: 0.6 MG/DL (ref 0.2–1.1)
BILIRUB UR QL STRIP: NEGATIVE
BUN BLD-MCNC: 12 MG/DL (ref 9–23)
BUN BLD-MCNC: 14 MG/DL (ref 7–18)
BUN/CREAT SERPL: 15.6 (ref 10–20)
CALCIUM BLD-MCNC: 10.2 MG/DL (ref 8.7–10.4)
CHLORIDE BLD-SCNC: 100 MMOL/L (ref 98–112)
CHLORIDE SERPL-SCNC: 106 MMOL/L (ref 98–112)
CLARITY UR: CLEAR
CO2 BLD-SCNC: 29 MMOL/L (ref 21–32)
CO2 SERPL-SCNC: 30 MMOL/L (ref 21–32)
COLOR UR: YELLOW
CREAT BLD-MCNC: 0.77 MG/DL
CREAT BLD-MCNC: 0.8 MG/DL
DEPRECATED RDW RBC AUTO: 47.4 FL (ref 35.1–46.3)
EGFRCR SERPLBLD CKD-EPI 2021: 91 ML/MIN/1.73M2 (ref 60–?)
EGFRCR SERPLBLD CKD-EPI 2021: 92 ML/MIN/1.73M2 (ref 60–?)
EOSINOPHIL # BLD AUTO: 0.21 X10(3) UL (ref 0–0.7)
EOSINOPHIL NFR BLD AUTO: 4.3 %
ERYTHROCYTE [DISTWIDTH] IN BLOOD BY AUTOMATED COUNT: 12.9 % (ref 11–15)
GLOBULIN PLAS-MCNC: 2.5 G/DL (ref 2–3.5)
GLUCOSE BLD-MCNC: 102 MG/DL (ref 70–99)
GLUCOSE BLD-MCNC: 106 MG/DL (ref 70–99)
GLUCOSE UR STRIP-MCNC: NEGATIVE MG/DL
HCT VFR BLD AUTO: 42.3 %
HCT VFR BLD AUTO: 43.5 %
HCT VFR BLD CALC: 47 %
HGB BLD-MCNC: 14.2 G/DL
HGB BLD-MCNC: 14.2 G/DL
HGB UR QL STRIP: NEGATIVE
IMM GRANULOCYTES # BLD AUTO: 0.01 X10(3) UL (ref 0–1)
IMM GRANULOCYTES NFR BLD: 0.2 %
INR BLD: 1.87 (ref 0.8–1.2)
ISTAT IONIZED CALCIUM FOR CHEM 8: 1.35 MMOL/L (ref 1.12–1.32)
KETONES UR STRIP-MCNC: NEGATIVE MG/DL
LEUKOCYTE ESTERASE UR QL STRIP: NEGATIVE
LIPASE SERPL-CCNC: 38 U/L (ref 12–53)
LYMPHOCYTES # BLD AUTO: 0.68 X10(3) UL (ref 1–4)
LYMPHOCYTES # BLD AUTO: 0.8 X10ˆ3/UL (ref 1–4)
LYMPHOCYTES NFR BLD AUTO: 14 %
LYMPHOCYTES NFR BLD AUTO: 14.4 %
MCH RBC QN AUTO: 31.4 PG (ref 26–34)
MCH RBC QN AUTO: 33.1 PG (ref 26–34)
MCHC RBC AUTO-ENTMCNC: 32.6 G/DL (ref 31–37)
MCHC RBC AUTO-ENTMCNC: 33.6 G/DL (ref 31–37)
MCV RBC AUTO: 96.2 FL (ref 80–100)
MCV RBC AUTO: 98.6 FL
MIXED CELL %: 9.3 %
MONOCYTES # BLD AUTO: 0.38 X10(3) UL (ref 0.1–1)
MONOCYTES NFR BLD AUTO: 7.8 %
NEUTROPHILS # BLD AUTO: 3.53 X10 (3) UL (ref 1.5–7.7)
NEUTROPHILS # BLD AUTO: 3.53 X10(3) UL (ref 1.5–7.7)
NEUTROPHILS # BLD AUTO: 4 X10ˆ3/UL (ref 1.5–7.7)
NEUTROPHILS NFR BLD AUTO: 72.9 %
NEUTROPHILS NFR BLD AUTO: 76.3 %
NITRITE UR QL STRIP: NEGATIVE
OSMOLALITY SERPL CALC.SUM OF ELEC: 292 MOSM/KG (ref 275–295)
PH UR STRIP: 7 [PH]
PLATELET # BLD AUTO: 155 10(3)UL (ref 150–450)
PLATELET # BLD AUTO: 180 X10ˆ3/UL (ref 150–450)
POTASSIUM BLD-SCNC: 4.4 MMOL/L (ref 3.6–5.1)
POTASSIUM SERPL-SCNC: 4.1 MMOL/L (ref 3.5–5.1)
PROT SERPL-MCNC: 7.1 G/DL (ref 5.7–8.2)
PROT UR STRIP-MCNC: NEGATIVE MG/DL
PROTHROMBIN TIME: 22.6 SECONDS (ref 11.6–14.8)
RBC # BLD AUTO: 4.29 X10(6)UL
RBC # BLD AUTO: 4.52 X10ˆ6/UL
SODIUM BLD-SCNC: 138 MMOL/L (ref 136–145)
SODIUM SERPL-SCNC: 141 MMOL/L (ref 136–145)
SP GR UR STRIP: 1.02
UROBILINOGEN UR STRIP-ACNC: <2 MG/DL
WBC # BLD AUTO: 4.9 X10(3) UL (ref 4–11)
WBC # BLD AUTO: 5.3 X10ˆ3/UL (ref 4–11)

## 2024-08-10 PROCEDURE — 85025 COMPLETE CBC W/AUTO DIFF WBC: CPT | Performed by: PHYSICIAN ASSISTANT

## 2024-08-10 PROCEDURE — 99215 OFFICE O/P EST HI 40 MIN: CPT | Performed by: PHYSICIAN ASSISTANT

## 2024-08-10 PROCEDURE — 80047 BASIC METABLC PNL IONIZED CA: CPT | Performed by: PHYSICIAN ASSISTANT

## 2024-08-10 PROCEDURE — 99285 EMERGENCY DEPT VISIT HI MDM: CPT | Performed by: INTERNAL MEDICINE

## 2024-08-10 PROCEDURE — 74177 CT ABD & PELVIS W/CONTRAST: CPT | Performed by: PHYSICIAN ASSISTANT

## 2024-08-10 PROCEDURE — 81002 URINALYSIS NONAUTO W/O SCOPE: CPT | Performed by: PHYSICIAN ASSISTANT

## 2024-08-10 PROCEDURE — 99204 OFFICE O/P NEW MOD 45 MIN: CPT | Performed by: SURGERY

## 2024-08-10 RX ORDER — BISACODYL 10 MG
10 SUPPOSITORY, RECTAL RECTAL
OUTPATIENT
Start: 2024-08-10

## 2024-08-10 RX ORDER — ONDANSETRON 2 MG/ML
4 INJECTION INTRAMUSCULAR; INTRAVENOUS EVERY 6 HOURS PRN
OUTPATIENT
Start: 2024-08-10

## 2024-08-10 RX ORDER — SODIUM CHLORIDE 9 MG/ML
INJECTION, SOLUTION INTRAVENOUS CONTINUOUS
OUTPATIENT
Start: 2024-08-10

## 2024-08-10 RX ORDER — ACETAMINOPHEN 500 MG
500 TABLET ORAL EVERY 4 HOURS PRN
OUTPATIENT
Start: 2024-08-10

## 2024-08-10 RX ORDER — SENNOSIDES 8.6 MG
17.2 TABLET ORAL NIGHTLY PRN
OUTPATIENT
Start: 2024-08-10

## 2024-08-10 RX ORDER — SODIUM CHLORIDE 9 MG/ML
1000 INJECTION, SOLUTION INTRAVENOUS ONCE
Status: COMPLETED | OUTPATIENT
Start: 2024-08-10 | End: 2024-08-10

## 2024-08-10 RX ORDER — METOCLOPRAMIDE HYDROCHLORIDE 5 MG/ML
5 INJECTION INTRAMUSCULAR; INTRAVENOUS EVERY 8 HOURS PRN
OUTPATIENT
Start: 2024-08-10

## 2024-08-10 RX ORDER — MORPHINE SULFATE 2 MG/ML
2 INJECTION, SOLUTION INTRAMUSCULAR; INTRAVENOUS EVERY 2 HOUR PRN
OUTPATIENT
Start: 2024-08-10

## 2024-08-10 RX ORDER — BENZONATATE 200 MG/1
200 CAPSULE ORAL 3 TIMES DAILY PRN
OUTPATIENT
Start: 2024-08-10

## 2024-08-10 RX ORDER — ENEMA 19; 7 G/133ML; G/133ML
1 ENEMA RECTAL ONCE AS NEEDED
OUTPATIENT
Start: 2024-08-10

## 2024-08-10 RX ORDER — MORPHINE SULFATE 2 MG/ML
1 INJECTION, SOLUTION INTRAMUSCULAR; INTRAVENOUS EVERY 2 HOUR PRN
OUTPATIENT
Start: 2024-08-10

## 2024-08-10 RX ORDER — MORPHINE SULFATE 4 MG/ML
4 INJECTION, SOLUTION INTRAMUSCULAR; INTRAVENOUS EVERY 2 HOUR PRN
OUTPATIENT
Start: 2024-08-10

## 2024-08-10 RX ORDER — POLYETHYLENE GLYCOL 3350 17 G/17G
17 POWDER, FOR SOLUTION ORAL DAILY PRN
OUTPATIENT
Start: 2024-08-10

## 2024-08-10 RX ORDER — MELATONIN
3 NIGHTLY PRN
OUTPATIENT
Start: 2024-08-10

## 2024-08-10 NOTE — ED PROVIDER NOTES
Chief Complaint   Patient presents with    Abdominal Pain       HPI:     Freddie Bautista is a 78 year old male who presents history of dilated cardiomyopathy, atrial fibs on Xarelto, pacemaker, trigeminal neuralgia for evaluation of left lower abdominal pain into the left lower back onset yesterday afternoon, notes gradual onset, max pain, 4 out of 10, currently a 2 out of 10, notes he medications since onset.  Abdominal surgical history includes hernia repair.  Notes voiding normally and bowel patterns solid yet more frequent overnight.  Denies history of diverticulitis Crohn's UC or IBS.  Denies associated headache dizziness neck pain chest pain shortness of breath nausea vomiting hematochezia dysuria hematuria upper lower extremity weakness or numbness.      PFSH    PFSH asessment screens reviewed and agree.  Nurses notes reviewed I agree with documentation.    Family History   Problem Relation Age of Onset    Prostate Cancer Father     Cancer Father         prostate cancer    Stroke Mother         Per NG: TIA's    Other (Other) Mother         vascular dementia    Skin cancer Maternal Grandmother     Cancer Maternal Grandmother     Kidney Disease Maternal Grandfather         Per NG kidney removal    Skin cancer Maternal Grandfather     Prostate Cancer Paternal Grandfather     Cancer Sister         unknown cancer    Other (Other) Sister         eczema    Heart Disease Other         PEr NG: Family history of CAD     Family history reviewed with patient/caregiver and is not pertinent to presenting problem.  Social History     Socioeconomic History    Marital status:      Spouse name: Not on file    Number of children: Not on file    Years of education: Not on file    Highest education level: Not on file   Occupational History    Not on file   Tobacco Use    Smoking status: Former     Current packs/day: 0.00     Average packs/day: 0.3 packs/day for 2.0 years (0.5 ttl pk-yrs)     Types: Cigarettes     Start date:  10/5/1967     Quit date: 10/5/1969     Years since quittin.8     Passive exposure: Past    Smokeless tobacco: Never   Vaping Use    Vaping status: Never Used   Substance and Sexual Activity    Alcohol use: Yes     Alcohol/week: 0.8 standard drinks of alcohol     Types: 1 Cans of beer per week     Comment: Occasionally    Drug use: Never    Sexual activity: Not on file   Other Topics Concern    [place] up [place] a farm Not Asked    History of tanning Not Asked    Outdoor occupation Not Asked    Pt has a pacemaker Yes    Pt has a defibrillator Yes    Reaction to local anesthetic No   Social History Narrative    Not on file     Social Determinants of Health     Financial Resource Strain: Low Risk  (8/10/2021)    [facility]    Overall Financial Resource Strain (CARDIA)     Difficulty of Paying Living Expenses: Not hard at all   Food Insecurity: Not on file   Transportation Needs: No Transportation Needs (8/10/2021)    [facility]    PRAPARE - Transportation     Lack of Transportation (Medical): No     Lack of Transportation (Non-Medical): No   Physical Activity: Not on file   Stress: Not on file   Social Connections: Not on file   Housing Stability: Not on file         ROS:   Positive for stated complaint: Abdominal pain  All other systems reviewed and negative except as noted above.  Constitutional and Vital Signs Reviewed.      Physical Exam:     Findings:    BP (!) 147/94   Pulse 70   Temp 97.6 °F (36.4 °C) (Temporal)   Resp 16   SpO2 97%   GENERAL: well developed, well nourished, well hydrated, no distress  SKIN: good skin turgor, no obvious rashes  NECK:  Supple, no adenopathy  CARDIO: Irregularly irregular.  EXTREMITIES: no cyanosis or edema. WOO without difficulty  GI: Mild left lower quadrant tenderness.  No palpable mass or bruit, negative Pathak.  Negative McBurney.  No peritoneal changes.  No inguinal or CVA  tenderness., normal bowel sounds  HEAD: normocephalic, atraumatic  EYES: sclera non icteric bilateral, conjunctiva clear  EARS: TMs clear bilaterally. Canals clear.  NOSE: nasal turbinates: pink, normal mucosa  THROAT: clear, without exudates, uvula midline, and airway patent  LUNGS: clear to auscultation bilaterally; no rales, rhonchi, or wheezes  NEURO: No focal deficits  PSYCH: Alert and oriented x3.  Answering questions appropriately.  Mood appropriate.    MDM/Assessment/Plan:   Orders for this encounter:    Orders Placed This Encounter    CT ABDOMEN+PELVIS(CONTRAST ONLY)(CPT=74177)     Order Specific Question:   If clinically indicated, CT Protocol includes Oral Contrast. Can Oral Contrast be administered?     Answer:   Yes     Order Specific Question:   What is the Relevant Clinical Indication / Reason for Exam?     Answer:   left side pain, r/o SBO vs diverticulitis     Order Specific Question:   Does patient have poor kidney function or elevated Creatinine/BUN levels?     Answer:   Yes     Order Specific Question:   Release to patient     Answer:   Immediate    POCT CBC     Order Specific Question:   Release to patient     Answer:   Immediate    POCT Urinalysis Dipstick    POCT ISTAT chem8 cartridge    iStat (Chem 8)    POCT Urine Dip    sodium chloride 0.9% infusion 1,000 mL    iopamidol 76% (ISOVUE-370) injection for power injector       Labs performed this visit:  Recent Results (from the past 10 hour(s))   POCT Urinalysis Dipstick    Collection Time: 08/10/24  8:53 AM   Result Value Ref Range    Urine Color Yellow Yellow    Urine Clarity Clear Clear    Specific Gravity, Urine 1.020 1.005 - 1.030    PH, Urine 7.0 5.0 - 8.0    Protein urine Negative Negative mg/dL    Glucose, Urine Negative Negative mg/dL    Ketone, Urine Negative Negative mg/dL    Bilirubin, Urine Negative Negative    Blood, Urine Negative Negative    Nitrite Urine Negative Negative    Urobilinogen urine <2.0 <2.0 mg/dL    Leukocyte  esterase urine Negative Negative   POCT CBC    Collection Time: 08/10/24  8:54 AM   Result Value Ref Range    WBC IC 5.3 4.0 - 11.0 x10ˆ3/uL    RBC IC 4.52 3.80 - 5.80 X10ˆ6/uL    HGB IC 14.2 13.0 - 17.5 g/dL    HCT IC 43.5 39.0 - 53.0 %    MCV IC 96.2 80.0 - 100.0 fL    MCH IC 31.4 26.0 - 34.0 pg    MCHC IC 32.6 31.0 - 37.0 g/dL    PLT .0 150.0 - 450.0 X10ˆ3/uL    # Neutrophil 4.0 1.5 - 7.7 X10ˆ3/uL    # Lymphocyte 0.8 (L) 1.0 - 4.0 X10ˆ3/uL    # Mixed Cells 0.5 0.1 - 1.0 X10ˆ3/uL    Neutrophil % 76.3 %    Lymphocyte % 14.4 %    Mixed Cell % 9.3 %   POCT ISTAT chem8 cartridge    Collection Time: 08/10/24  8:59 AM   Result Value Ref Range    ISTAT Sodium 138 136 - 145 mmol/L    ISTAT BUN 14 7 - 18 mg/dL    ISTAT Potassium 4.4 3.6 - 5.1 mmol/L    ISTAT Chloride 100 98 - 112 mmol/L    ISTAT Ionized Calcium 1.35 (H) 1.12 - 1.32 mmol/L    ISTAT Hematocrit 47 37 - 53 %    ISTAT Glucose 106 (H) 70 - 99 mg/dL    ISTAT TCO2 29 21 - 32 mmol/L    ISTAT Creatinine 0.80 0.70 - 1.30 mg/dL    eGFR-Cr 91 >=60 mL/min/1.73m2       MDM:  Metabolic profile showed leukocytosis, chemistries and urinalysis unremarkable.  Patient given IV fluids and agreed to go to diagnostic testing through the hospital for advanced imaging.  Benign abdominal exam on reevaluation.     Patient CT results shows concerns of pneumoperitoneum concerning in proximity to a hiatal hernia.  Patient and wife contacted via phone on findings and concerns warranting further evaluation in the emergency department.  Charge nurse [facility] was notified.  Dr. Stahl supervising agreeable plan of care.  Agrees to continue n.p.o. status.  Denies any subjective changes in symptoms.    Diagnosis:    ICD-10-CM    1. Pneumoperitoneum  K66.8       2. Abdominal pain, left lower quadrant  R10.32           All results reviewed and discussed with patient.  See AVS for detailed discharge instructions for your condition today.    Follow Up with:  No follow-up provider specified.   DM

## 2024-08-10 NOTE — H&P
[facility]  part of [facility]    History and Physical     Freddie Bautista Patient Status:  Emergency    1945 MRN [id number]   Location [facility] EMERGENCY DEPARTMENT Attending Ambrose Bloom MD   Hosp Day # 0 PCP Tomas Beaulieu MD     History of Present Illness: Freddie Bautista is a 78 year old male with a past medical history of Afib, COPD, Hypothyroidism, presented to the ER with complaints of sudden onset abdominal pain that started while he was driving. The patient went to immediate care where he was found to have an abnormal CT scan and was sent to the hospital for further care.   The patient denied any chest pain, sob, n/v/d or any other complaints at the time of interview.     Past Medical History:  Past Medical History:    Allergic rhinitis    Asthma with COPD (chronic obstructive pulmonary disease) (HCC)    Atherosclerosis of coronary artery    Atrial fibrillation (HCC)    Atrial flutter (HCC)    BCC (basal cell carcinoma of skin)    left side neck    BCC (basal cell carcinoma)    Left chest    Benign prostatic hypertrophy    Closed fracture of left distal radius    Colon adenoma    Coronary artery disease    Per N coronary stents    Disorder of thyroid    Essential hypertension    Extrinsic asthma, unspecified    H/O hernia repair    Hernia, inguinal, bilateral    Herniated disc    Herniated disc    High blood pressure    High cholesterol    Hyperlipidemia    Inguinal hernia recurrent unilateral    Pulmonary hypertension (HCC)    Pulmonary hypertension (HCC)    S/P ablation of atrial flutter    Sleep apnea    Thyroid nodule    Per NG: surgery x2     Thyroid nodule    TN (trigeminal neuralgia)    Per NG: Anti-seizure medication    TN (trigeminal neuralgia)        Past Surgical History:   Past Surgical History:   Procedure Laterality Date    Angioplasty (coronary)      Cabg      CABG and aortic valve replacement     Cardioversion      Cath ablation  2019     for a-flutter in [place]    Colonoscopy      Colonoscopy  11/18/2019    Colonoscopy N/A 11/18/2019    Procedure: COLONOSCOPY;  Surgeon: River Zuniga MD;  Location: [facility] ENDOSCOPY    Ep cardioversion 1x  8/4/2021         Ep cardioversion 1x  10/18/2021         Ep pulmonary vein/a-fib ablation  10/6/2021         Exc skin benig 2.1-3cm remaindr body  08/10/2022    Hernia surgery      Other surgical history      trigeminal neuralgia surgery    Other surgical history      partial thyroidectomy for thyroid nodule    Replace aortic valve w/byp      Repr cmpl wnd head,fac,hand 2.6-7.5  08/10/2022    Tonsillectomy         Social History:  reports that he quit smoking about 54 years ago. His smoking use included cigarettes. He started smoking about 56 years ago. He has a 0.5 pack-year smoking history. He has been exposed to tobacco smoke. He has never used smokeless tobacco. He reports current alcohol use of about 0.8 standard drinks of alcohol per week. He reports that he does not use drugs.    Family History:   Family History   Problem Relation Age of Onset    Prostate Cancer Father     Cancer Father         prostate cancer    Stroke Mother         Per NG: TIA's    Other (Other) Mother         vascular dementia    Skin cancer Maternal Grandmother     Cancer Maternal Grandmother     Kidney Disease Maternal Grandfather         Per NG kidney removal    Skin cancer Maternal Grandfather     Prostate Cancer Paternal Grandfather     Cancer Sister         unknown cancer    Other (Other) Sister         eczema    Heart Disease Other         PEr NG: Family history of CAD       Allergies:   Allergies   Allergen Reactions    Cat Hair Extract      Other reaction(s): CAT HAIR STD EXTRACT    Sulfa Antibiotics      Other reaction(s): SULFA (SULFONAMIDE ANTIBIOTICS)       Medications:    Current Facility-Administered Medications on File Prior to Encounter   Medication Dose Route Frequency Provider Last Rate Last Admin    [COMPLETED]  sodium chloride 0.9% infusion 1,000 mL  1,000 mL Intravenous Once Keenan Rodriguez PA 1,000 mL/hr at 08/10/24 0850 1,000 mL at 08/10/24 0850    [COMPLETED] iopamidol 76% (ISOVUE-370) injection for power injector  80 mL Intravenous ONCE PRN Keenan Rodriguez PA   80 mL at 08/10/24 0955    [COMPLETED] gadoterate meglumine (Dotarem) 10 MMOL/20ML injection 20 mL  20 mL Intravenous ONCE PRN Adeeljong ZohraEVER   17 mL at 24 [id number]     Current Outpatient Medications on File Prior to Encounter   Medication Sig Dispense Refill    [] azithromycin 250 MG Oral Tab Take 2 tablets (500 mg total) by mouth daily for 1 day, THEN 1 tablet (250 mg total) daily for 4 days. 6 tablet 0    benzonatate 200 MG Oral Cap Take 1 capsule (200 mg total) by mouth 3 (three) times daily as needed for cough. 40 capsule 1    levothyroxine 100 MCG Oral Tab Take 1 tablet (100 mcg total) by mouth daily. 90 tablet 3    carBAMazepine  MG Oral Tablet 12 Hr Take 1 tablet (400 mg total) by mouth 3 (three) times daily. 270 tablet 1    gabapentin 100 MG Oral Cap Start with 1 pill TID for 1 week, then 2 pills TID for another week, then 3 pills TID (Patient not taking: Reported on 2024) 270 capsule 5    XARELTO 20 MG Oral Tab Take 1 tablet (20 mg total) by mouth daily. Resume on  at night (Patient not taking: Reported on 2024) 90 tablet 1    carvedilol 3.125 MG Oral Tab Take 1 tablet (3.125 mg total) by mouth 2 (two) times daily with meals. 60 tablet 5    sacubitril-valsartan 24-26 MG Oral Tab Take 1 tablet by mouth 2 (two) times daily.      Magnesium Oxide 400 (240 Mg) MG Oral Tab Take 1 tablet (400 mg total) by mouth daily.      atorvastatin 80 MG Oral Tab Take 1 tablet (80 mg total) by mouth nightly.      ascorbic acid 1000 MG Oral Tab Take 1 tablet (1,000 mg total) by mouth daily. 30 tablet 0    Vitamin D3, Cholecalciferol, 25 MCG Oral Tab Take 2 tablets (2,000 Units total) by mouth daily. 30 tablet 0    Multiple  Vitamins-Minerals (MERLIN MULTIVITAMIN FOR MEN) Oral Tab Take 1 tablet by mouth daily.      zinc sulfate 220 (50 Zn) MG Oral Cap Take 50 mg by mouth daily.      Sennosides-Docusate Sodium (STOOL SOFTENER/LAXATIVE OR) Take by mouth.      aspirin 81 MG Oral Chew Tab Chew by mouth nightly.        Glucosamine-Chondroit-Vit C-Mn (GLUCOSAMINE 1500 COMPLEX) Oral Cap Take 1 tablet by mouth daily. 1200 mg daily          Review of Systems:   A comprehensive 14 point review of systems was completed.    Pertinent positives and negatives noted in the HPI.    Physical Exam:    BP (!) 147/102   Pulse 70   Temp 96.8 °F (36 °C)   Resp 16   Ht 5' 7\" (1.702 m)   Wt 183 lb (83 kg)   SpO2 97%   BMI 28.66 kg/m²   General: No acute distress. Alert and oriented x 3.  Respiratory: Clear to auscultation bilaterally. No wheezes. No rhonchi.  Cardiovascular: S1, S2. Regular rate and rhythm. No murmurs, no rubs or gallops. Equal pulses.   Abdomen: Soft, TTP in LUQ  Neurologic: No focal neurological deficits. CNII-XII grossly intact.  Extremities: No edema or cyanosis.      Diagnostic Data:      Labs:  Recent Labs   Lab 08/05/24  1626 08/09/24  0846 08/10/24  0854 08/10/24  1124   WBC  --   --   --  4.9   HGB  --   --   --  14.2   MCV  --   --  96.2 98.6   PLT  --   --   --  155.0   INR 1.55* 2.02*  --   --        Recent Labs   Lab 08/10/24  1124   *   BUN 12   CREATSERUM 0.77   CA 10.2   ALB 4.6      K 4.1      CO2 30.0   ALKPHO 94   AST 25   ALT 22   BILT 0.6   TP 7.1       Estimated Creatinine Clearance: 75.1 mL/min (based on SCr of 0.77 mg/dL).    Recent Labs   Lab 08/05/24  1626 08/09/24  0846   PTP 19.6* 24.1*   INR 1.55* 2.02*       No results for input(s): \"TROP\", \"CK\" in the last 168 hours.    Imaging: Imaging data reviewed in Epic.      ASSESSMENT / PLAN:     Abdominal pain 2/2 paraesophageal hernia  Imaging reviewed  NPO  PRN pain meds, IVF ordered  Gsx on consult  Afib, on xarelto  CAD  Continue home  meds  Monitor telemetry  HTN  BP well controlled  Continue home meds  Hypothyroidism  Continue home meds      Quality:  DVT Prophylaxis: Xarelto  CODE status: Full    Plan of care discussed with ED physician    Mina Elliott MD  8/10/2024                 **Certification      PHYSICIAN Certification of Need for Inpatient Hospitalization - Initial Certification    Patient will require inpatient services that will reasonably be expected to span two midnight's based on the clinical documentation in H+P.   Based on patients current state of illness, I anticipate that, after discharge, patient will require TBD.        The 21st Century Cures Act makes medical notes like these available to patients in the interest of transparency. Please be advised this is a medical document. Medical documents are intended to carry relevant information, facts as evident, and the clinical opinion of the practitioner. The medical note is intended as peer to peer communication and may appear blunt or direct. It is written in medical language and may contain abbreviations or verbiage that are unfamiliar.

## 2024-08-10 NOTE — CONSULTS
[facility]  part of [facility]    Report of Consultation    Freddie Bautista Patient Status:  Emergency    1945 MRN [id number]   Location [facility] EMERGENCY DEPARTMENT Attending Ambrose Bloom MD   Hosp Day # 0 PCP Tomas Beaulieu MD     Date of Admission:  8/10/2024  Date of Consult: 8/10/2024    Reason for Consultation:   Consults    History provided by:Pt  HPI:     Chief Complaint   Patient presents with    Abdomen/Flank Pain     HPI  Consult was requested for this very pleasant 77 yo patient with multiple medical problems CAD, COPD, anticoagulated on coumadin and Xaralto, dilated cardiomyopathy, a fib, pacemaker, Aortic valve replacement, pulmonary hypertension, and chronic large hiatal hernia. The patient developed upper abdominal pain last evening. He ate dinner, his pain decreased slightly but never completely resolved. Today he presented for evaluation. He has been passing flatus, no BM today. No N/V. No melena. No SOB, no CP. No GERD symptoms.    CTAP today:  CONCLUSION:  New very small foci pneumoperitoneum has developed within the moderate size paraesophageal hiatal hernia and anterior to the left hepatic lobe.  This likely represents perforation of the herniated distal stomach which is contained in the   hiatal hernia.  No abscess     Labs are wnl except INR 2.02.    He has known about his hiatal hernia for some time, no plan for intervention.     History     Past Medical History:    Allergic rhinitis    Asthma with COPD (chronic obstructive pulmonary disease) (HCC)    Atherosclerosis of coronary artery    Atrial fibrillation (HCC)    Atrial flutter (HCC)    BCC (basal cell carcinoma of skin)    left side neck    BCC (basal cell carcinoma)    Left chest    Benign prostatic hypertrophy    Closed fracture of left distal radius    Colon adenoma    Coronary artery disease    Per N coronary stents    Disorder of thyroid    Essential hypertension    Extrinsic asthma,  unspecified    H/O hernia repair    Hernia, inguinal, bilateral    Herniated disc    Herniated disc    High blood pressure    High cholesterol    Hyperlipidemia    Inguinal hernia recurrent unilateral    Pulmonary hypertension (HCC)    Pulmonary hypertension (HCC)    S/P ablation of atrial flutter    Sleep apnea    Thyroid nodule    Per NG: surgery x2     Thyroid nodule    TN (trigeminal neuralgia)    Per NG: Anti-seizure medication    TN (trigeminal neuralgia)     Past Surgical History:   Procedure Laterality Date    Angioplasty (coronary)      Cabg      CABG and aortic valve replacement 2014    Cardioversion      Cath ablation  03/2019    for a-flutter in [place]    Colonoscopy      Colonoscopy  11/18/2019    Colonoscopy N/A 11/18/2019    Procedure: COLONOSCOPY;  Surgeon: River Zuniga MD;  Location: [facility] ENDOSCOPY    Ep cardioversion 1x  8/4/2021         Ep cardioversion 1x  10/18/2021         Ep pulmonary vein/a-fib ablation  10/6/2021         Exc skin benig 2.1-3cm remaindr body  08/10/2022    Hernia surgery      Other surgical history      trigeminal neuralgia surgery    Other surgical history      partial thyroidectomy for thyroid nodule    Replace aortic valve w/byp      Repr cmpl wnd head,fac,hand 2.6-7.5  08/10/2022    Tonsillectomy       Family History   Problem Relation Age of Onset    Prostate Cancer Father     Cancer Father         prostate cancer    Stroke Mother         Per NG: TIA's    Other (Other) Mother         vascular dementia    Skin cancer Maternal Grandmother     Cancer Maternal Grandmother     Kidney Disease Maternal Grandfather         Per NG kidney removal    Skin cancer Maternal Grandfather     Prostate Cancer Paternal Grandfather     Cancer Sister         unknown cancer    Other (Other) Sister         eczema    Heart Disease Other         PEr NG: Family history of CAD     Social History:  Social History     Socioeconomic History    Marital status:    Tobacco Use    Smoking  status: Former     Current packs/day: 0.00     Average packs/day: 0.3 packs/day for 2.0 years (0.5 ttl pk-yrs)     Types: Cigarettes     Start date: 10/5/1967     Quit date: 10/5/1969     Years since quittin.8     Passive exposure: Past    Smokeless tobacco: Never   Vaping Use    Vaping status: Never Used   Substance and Sexual Activity    Alcohol use: Yes     Alcohol/week: 0.8 standard drinks of alcohol     Types: 1 Cans of beer per week     Comment: Occasionally    Drug use: Never   Other Topics Concern    Pt has a pacemaker Yes    Pt has a defibrillator Yes    Reaction to local anesthetic No     Social Determinants of Health     Financial Resource Strain: Low Risk  (8/10/2021)    Received from [facility]    Overall Financial Resource Strain (CARDIA)     Difficulty of Paying Living Expenses: Not hard at all   Transportation Needs: No Transportation Needs (8/10/2021)    [facility]    PRAPARE - Transportation     Lack of Transportation (Medical): No     Lack of Transportation (Non-Medical): No     Allergies/Medications:   Allergies:   Allergies   Allergen Reactions    Cat Hair Extract      Other reaction(s): CAT HAIR STD EXTRACT    Sulfa Antibiotics      Other reaction(s): SULFA (SULFONAMIDE ANTIBIOTICS)     (Not in a hospital admission)      Review of Systems:   Review of Systems       GENERAL: per HPI  SKIN: no ulcerated or worrisome skin lesions  EYES:denies blurred vision or double vision  HEENT: denies new nasal congestion, sinus pain or ST  LUNGS: denies shortness of breath with exertion  CARDIOVASCULAR: denies chest pain on exertion  GI: no hematemesis, no BRBPR, no worsening heartburn  : no dysuria, no blood in urine, no difficulty urinating  MUSCULOSKELETAL: no new musculoskeletal complaints  NEURO: no persistent, recurrent  headaches  PSYCHE:no depression or anxiety  HEMATOLOGIC: no hx of blood dyscrasia  ENDOCRINE: no new  endocrine problems  ALL/ASTHMA: no new hx of severe allergy or asthma  BACK: normal, no spinal deformity, no CVA tenderness      Physical Exam:   Vital Signs:   height is 5' 7\" (1.702 m) and weight is 183 lb (83 kg). His temperature is 96.8 °F (36 °C). His blood pressure is 154/113 (abnormal) and his pulse is 70. His respiration is 16 and oxygen saturation is 99%.   Physical Exam  Alert, NAD  HEENT wnl, anicteric, PERRL  Neck supple, norm ROM, no JVD  L CTA B  H Reg rate  Abd soft, NT, ND, no masses, no HSM.  Extr no c/c/e  Skin intact, no jaundice, no rashes, no lesions  Neuro grossly intact, no focal deficits, no tremors  Back no deformity, no CVA tnd.     Results:     Lab Results   Component Value Date    WBC 4.9 08/10/2024    HGB 14.2 08/10/2024    HCT 42.3 08/10/2024    .0 08/10/2024    CREATSERUM 0.77 08/10/2024    BUN 12 08/10/2024     08/10/2024    K 4.1 08/10/2024     08/10/2024    CO2 30.0 08/10/2024     (H) 08/10/2024    CA 10.2 08/10/2024    ALB 4.6 08/10/2024    ALKPHO 94 08/10/2024    BILT 0.6 08/10/2024    TP 7.1 08/10/2024    AST 25 08/10/2024    ALT 22 08/10/2024    INR 2.02 (H) 08/09/2024    T4F 0.8 08/04/2021    TSH 3.240 10/27/2023    LIP 38 08/10/2024    PSA 3.98 01/23/2024    DDIMER 0.36 08/09/2021    CRP 7.82 (H) 08/09/2021    MG 1.9 08/07/2022    TROP <0.045 08/08/2021    TROPHS 33 08/07/2022    CK 64 08/08/2021     No results found.        Impression:     Pneumoperitoneum, possible perforated viscous involved in HH    CAD, COPD, anticoagulated on coumadin and Xaralto, dilated cardiomyopathy, a fib, pacemaker, Aortic valve replacement, pulmonary hypertension, and chronic large hiatal hernia.             Recommendations:  NPO, IVF, analgesics as needed, close observation. Plan transfer to a tertiary care center. We discussed the diagnosis, further workup, possible need for surgical intervention. Await decision re transfer. All of the patient's questions were answered  to his satisfaction.       Laura Garcia MD  8/10/2024

## 2024-08-10 NOTE — CM/SW NOTE
Per request from [facility], called [facility] regarding possible transfer.    Spoke with Dedra CARRILLO and transferred call to [facility].    Face sheet faxed:  [phone]    130pm    Called [facility] and spoke with Dedra CARRILLO and they had a trauma that their surgeon is busy with but will call as soon as he can.    MD updated

## 2024-08-10 NOTE — CM/SW NOTE
Received call back from Dedra CARRILLO at [facility] stating that room ready.    Accepting MD:  gen Asiya surgeon     Room:  [address] [place] [address], Room 5460     Receiving RN:  [phone]    [facility] arranged to transport patient to [place].    ETA 650pm    PCS form completed in Epic.     Copy of CD burned and given to Tabitha CARRILLO.

## 2024-08-10 NOTE — CM/SW NOTE
Patient accepted for transfer to [facility].    Accepting MD:  gen Aisya surgeon    Room:  TBD    Dedra RN at [facility] stated that they are waitng a discharge before patient can get a room.    MD and RN updated

## 2024-08-10 NOTE — ED INITIAL ASSESSMENT (HPI)
Presents with LUQ abdominal pain beginning yesterday. Pt was sent from  for further evaluation of hernia seen on CT scan. Istat blood and urine performed at . Pt denies n/v/d or fevers. Denies urinary symptoms

## 2024-08-10 NOTE — ED INITIAL ASSESSMENT (HPI)
Mid lower abdominal pain, started yesterday and has moved to left side of abdomen. Denies urinary issues. Denies diarrhea, but frequent stools yesterday. Denies fever.

## 2024-08-10 NOTE — CM/SW NOTE
Received call from Dedra CARRILLO at [facility] with Dr. Boswell, surgeon, on the phone for ERMD.    Call transferred to Dr. Bloom.

## 2024-08-10 NOTE — ED QUICK NOTES
Orders for admission, patient is aware of plan and ready to go upstairs. Any questions, please call ED LORENA Lu at extension [phone].     Patient Covid vaccination status: Fully vaccinated     COVID Test Ordered in ED: None    COVID Suspicion at Admission: N/A    Running Infusions:  0.9 NS bolus    Mental Status/LOC at time of transport: A/Ox4    Other pertinent information: Independent and ambulatory from home.   CIWA score: N/A   NIH score:  N/A

## 2024-08-10 NOTE — CM/SW NOTE
Patient accepted for transfer to [facility].     Accepting MD:  gen Asiya surgeon    Room:  [address] [place] [address], Room 7253    Receiving RN:  [phone]    Room is still being cleaned and Dedra CARRILLO at [facility] will call [facility] when room is nearly ready so transportation can be arranged.    MD and RN updated

## 2024-08-10 NOTE — ED PROVIDER NOTES
Patient Seen in: [facility] Emergency Department    History     Chief Complaint   Patient presents with    Abdomen/Flank Pain       HPI    History is provided by patient/independent historian: patient  78 year old male with history of asthma with COPD, atrial fibrillation, colon adenoma, here with complaints of acute onset abdominal pain that started yesterday while he was driving.  No known trauma.  No nausea or vomiting or changes in bowel movements.  No fevers.  He went to immediate care where he had a CAT scan that was abnormal and he was sent here for further evaluation.    History reviewed.   Past Medical History:    Allergic rhinitis    Asthma with COPD (chronic obstructive pulmonary disease) (HCC)    Atherosclerosis of coronary artery    Atrial fibrillation (HCC)    Atrial flutter (HCC)    BCC (basal cell carcinoma of skin)    left side neck    BCC (basal cell carcinoma)    Left chest    Benign prostatic hypertrophy    Closed fracture of left distal radius    Colon adenoma    Coronary artery disease    Per N coronary stents    Disorder of thyroid    Essential hypertension    Extrinsic asthma, unspecified    H/O hernia repair    Hernia, inguinal, bilateral    Herniated disc    Herniated disc    High blood pressure    High cholesterol    Hyperlipidemia    Inguinal hernia recurrent unilateral    Pulmonary hypertension (HCC)    Pulmonary hypertension (HCC)    S/P ablation of atrial flutter    Sleep apnea    Thyroid nodule    Per NG: surgery x2     Thyroid nodule    TN (trigeminal neuralgia)    Per NG: Anti-seizure medication    TN (trigeminal neuralgia)         History reviewed.   Past Surgical History:   Procedure Laterality Date    Angioplasty (coronary)      Cabg      CABG and aortic valve replacement     Cardioversion      Cath ablation  2019    for a-flutter in [place]    Colonoscopy      Colonoscopy  2019    Colonoscopy N/A 2019    Procedure: COLONOSCOPY;  Surgeon:  River Zuniga MD;  Location: [facility] ENDOSCOPY    Ep cardioversion 1x  8/4/2021         Ep cardioversion 1x  10/18/2021         Ep pulmonary vein/a-fib ablation  10/6/2021         Exc skin benig 2.1-3cm remaindr body  08/10/2022    Hernia surgery      Other surgical history      trigeminal neuralgia surgery    Other surgical history      partial thyroidectomy for thyroid nodule    Replace aortic valve w/byp      Repr cmpl wnd head,fac,hand 2.6-7.5  08/10/2022    Tonsillectomy           Home Medications reviewed :  (Not in a hospital admission)        History reviewed.   Social History     Socioeconomic History    Marital status:    Tobacco Use    Smoking status: Former     Current packs/day: 0.00     Average packs/day: 0.3 packs/day for 2.0 years (0.5 ttl pk-yrs)     Types: Cigarettes     Start date: 10/5/1967     Quit date: 10/5/1969     Years since quittin.8     Passive exposure: Past    Smokeless tobacco: Never   Vaping Use    Vaping status: Never Used   Substance and Sexual Activity    Alcohol use: Yes     Alcohol/week: 0.8 standard drinks of alcohol     Types: 1 Cans of beer per week     Comment: Occasionally    Drug use: Never   Other Topics Concern    Pt has a pacemaker Yes    Pt has a defibrillator Yes    Reaction to local anesthetic No         ROS  Review of Systems   Respiratory:  Negative for shortness of breath.    Cardiovascular:  Negative for chest pain.   Gastrointestinal:  Positive for abdominal pain.   All other systems reviewed and are negative.     All other pertinent organ systems are reviewed and are negative.      Physical Exam     ED Triage Vitals [08/10/24 1106]   BP (!) 149/93   Pulse 70   Resp 16   Temp 96.8 °F (36 °C)   Temp src    SpO2 97 %   O2 Device None (Room air)     Vital signs reviewed.      Physical Exam  Vitals and nursing note reviewed.   Cardiovascular:      Pulses: Normal pulses.   Pulmonary:      Effort: No respiratory distress.   Abdominal:      General: There  is no distension.      Tenderness: There is abdominal tenderness in the left upper quadrant.   Neurological:      Mental Status: He is alert.         ED Course       Labs:     Labs Reviewed   COMP METABOLIC PANEL (14) - Abnormal; Notable for the following components:       Result Value    Glucose 102 (*)     All other components within normal limits   CBC WITH DIFFERENTIAL WITH PLATELET - Abnormal; Notable for the following components:    RDW-SD 47.4 (*)     Lymphocyte Absolute 0.68 (*)     All other components within normal limits   PROTHROMBIN TIME (PT) - Abnormal; Notable for the following components:    PT 22.6 (*)     INR 1.87 (*)     All other components within normal limits   LIPASE - Normal   RAINBOW DRAW LAVENDER   RAINBOW DRAW BLUE   RAINBOW DRAW LIGHT GREEN         My EKG Interpretation:   As reviewed and Interpreted by me      Imaging Results Available and Reviewed while in ED:   No results found.    Decision rules/scores evaluated: none      Diagnostic labs/tests considered but not ordered: CT abd/pelvis    ED Medications Administered:   Medications   sodium chloride 0.9 % IV bolus 1,000 mL (0 mL Intravenous Stopped 8/10/24 1519)   piperacillin-tazobactam (Zosyn) 4.5 g in dextrose 5% 100 mL IVPB-ADDV (0 g Intravenous Stopped 8/10/24 1247)                MDM       Medical Decision Making      Differential Diagnosis: After obtaining the patient's history, performing the physical exam and reviewing the diagnostics, multiple initial diagnoses were considered based on the presenting problem including gastroenteritis, diverticulitis, pneumoperitoneum, AAA    External document review: I personally reviewed available external medical records for any recent pertinent discharge summaries, testing, and procedures - the findings are as follows: today visit with ROSIE Rodriguez for  abdominal pain, CT with pneumoperitoneum    Complicating Factors: The patient already  has a past medical history of Allergic rhinitis,  Asthma with COPD (chronic obstructive pulmonary disease) (HCC) (05/21/2024), Atherosclerosis of coronary artery, Atrial fibrillation (HCC), Atrial flutter (HCC), BCC (basal cell carcinoma of skin) (2022), BCC (basal cell carcinoma) (10/2022), Benign prostatic hypertrophy, Closed fracture of left distal radius (06/25/2019), Colon adenoma (11/18/2019), Coronary artery disease (2000), Disorder of thyroid, Essential hypertension, Extrinsic asthma, unspecified, H/O hernia repair, Hernia, inguinal, bilateral, Herniated disc, Herniated disc, High blood pressure, High cholesterol, Hyperlipidemia, Inguinal hernia recurrent unilateral (07/04/2013), Pulmonary hypertension (HCC), Pulmonary hypertension (HCC), S/P ablation of atrial flutter (06/12/2019), Sleep apnea, Thyroid nodule, Thyroid nodule, TN (trigeminal neuralgia) (1985), and TN (trigeminal neuralgia). to contribute to the complexity of this ED evaluation.    Procedures performed: none    Discussed management with physician/appropriate source: Dr. Malone, Dr. Garcia - requesting transfer to tertiary [facility], Dr. Hendrickson - accepting Dr at [place],  assisting in transfer    Considered admission/deescalation of care for: pneumoperitoneum    Social determinants of health affecting patient care: none    Prescription medications considered: discussed continuing current medication regimen    The patient requires continuous monitoring for: abdominal pain    Shared decision making: discussed possible admission    Critical Care Time:  Total critical care time for this patient was 46 minutes exclusive of separately billable procedures, but in obtaining history, performing a physical exam, bedside monitoring of interventions, collecting and interpreting test, and discussion with consultants.    Disposition and Plan     Clinical Impression:  1. Pneumoperitoneum        Disposition:  Transfer to another facility    Follow-up:  No follow-up provider  specified.    Medications Prescribed:  Current Discharge Medication List

## 2024-08-10 NOTE — ED QUICK NOTES
Report given to Tete Membreno RN. Patient is aware of plan and ready for transfer once [facility] bed is cleaned. Haseeb to call [place] when bed is ready.

## 2024-08-18 NOTE — PROGRESS NOTES
Freddie Bautista is a 78 year old male.  HPI:     CC:    Chief Complaint   Patient presents with    Full Skin Exam     LOV 24. Pt presents for FBSE. Has c/o itchy red spots on his chest. Tried cortisone, which relieved the itching. Has personal Hx of Ak's and BCC.         Allergies:  Cat hair extract and Sulfa antibiotics    HISTORY:    Past Medical History:    Allergic rhinitis    Asthma with COPD (chronic obstructive pulmonary disease) (HCC)    Atherosclerosis of coronary artery    Atrial fibrillation (HCC)    Atrial flutter (HCC)    BCC (basal cell carcinoma of skin)    left side neck    BCC (basal cell carcinoma)    Left chest    Benign prostatic hypertrophy    Closed fracture of left distal radius    Colon adenoma    Coronary artery disease    Per N coronary stents    Disorder of thyroid    Essential hypertension    Extrinsic asthma, unspecified    H/O hernia repair    Hernia, inguinal, bilateral    Herniated disc    Herniated disc    High blood pressure    High cholesterol    Hyperlipidemia    Inguinal hernia recurrent unilateral    Pulmonary hypertension (HCC)    Pulmonary hypertension (HCC)    S/P ablation of atrial flutter    Sleep apnea    Thyroid nodule    Per NG: surgery x2     Thyroid nodule    TN (trigeminal neuralgia)    Per NG: Anti-seizure medication    TN (trigeminal neuralgia)      Past Surgical History:   Procedure Laterality Date    Angioplasty (coronary)      Cabg      CABG and aortic valve replacement 2014    Cardioversion      Cath ablation  2019    for a-flutter in [place]    Colonoscopy      Colonoscopy  2019    Colonoscopy N/A 2019    Procedure: COLONOSCOPY;  Surgeon: River Zuniga MD;  Location: [facility] ENDOSCOPY    Ep cardioversion 1x  8/4/2021         Ep cardioversion  1x  10/18/2021         Ep pulmonary vein/a-fib ablation  10/6/2021         Exc skin benig 2.1-3cm remaindr body  08/10/2022    Hernia surgery      Other surgical history      trigeminal neuralgia surgery    Other surgical history      partial thyroidectomy for thyroid nodule    Replace aortic valve w/byp      Repr cmpl wnd head,fac,hand 2.6-7.5  08/10/2022    Tonsillectomy        Family History   Problem Relation Age of Onset    Prostate Cancer Father     Cancer Father         prostate cancer    Stroke Mother         Per NG: TIA's    Other (Other) Mother         vascular dementia    Skin cancer Maternal Grandmother     Cancer Maternal Grandmother     Kidney Disease Maternal Grandfather         Per NG kidney removal    Skin cancer Maternal Grandfather     Prostate Cancer Paternal Grandfather     Cancer Sister         unknown cancer    Other (Other) Sister         eczema    Heart Disease Other         PEr NG: Family history of CAD      Social History     Socioeconomic History    Marital status:    Tobacco Use    Smoking status: Former     Current packs/day: 0.00     Average packs/day: 0.3 packs/day for 2.0 years (0.5 ttl pk-yrs)     Types: Cigarettes     Start date: 10/5/1967     Quit date: 10/5/1969     Years since quittin.9     Passive exposure: Past    Smokeless tobacco: Never   Vaping Use    Vaping status: Never Used   Substance and Sexual Activity    Alcohol use: Yes     Alcohol/week: 0.8 standard drinks of alcohol     Types: 1 Cans of beer per week     Comment: Occasionally    Drug use: Never   Other Topics Concern    Pt has a pacemaker Yes    Pt has a defibrillator Yes    Reaction to local anesthetic No     Social Determinants of Health     Financial Resource Strain: Low Risk  (8/10/2021)    Received from [facility]    Overall Financial Resource Strain (CARDIA)     Difficulty of Paying Living Expenses: Not hard at all   Transportation Needs: No Transportation Needs  (8/10/2021)    Received from [facility] - Transportation     Lack of Transportation (Medical): No     Lack of Transportation (Non-Medical): No        Current Outpatient Medications   Medication Sig Dispense Refill    levothyroxine 100 MCG Oral Tab Take 1 tablet (100 mcg total) by mouth daily. 90 tablet 3    carBAMazepine  MG Oral Tablet 12 Hr Take 1 tablet (400 mg total) by mouth 3 (three) times daily. 270 tablet 1    carvedilol 3.125 MG Oral Tab Take 1 tablet (3.125 mg total) by mouth 2 (two) times daily with meals. 60 tablet 5    sacubitril-valsartan 24-26 MG Oral Tab Take 1 tablet by mouth 2 (two) times daily.      Magnesium Oxide 400 (240 Mg) MG Oral Tab Take 1 tablet (400 mg total) by mouth daily.      atorvastatin 80 MG Oral Tab Take 1 tablet (80 mg total) by mouth nightly.      ascorbic acid 1000 MG Oral Tab Take 1 tablet (1,000 mg total) by mouth daily. 30 tablet 0    Vitamin D3, Cholecalciferol, 25 MCG Oral Tab Take 2 tablets (2,000 Units total) by mouth daily. 30 tablet 0    Multiple Vitamins-Minerals (MERLIN MULTIVITAMIN FOR MEN) Oral Tab Take 1 tablet by mouth daily.      zinc sulfate 220 (50 Zn) MG Oral Cap Take 50 mg by mouth daily.      Sennosides-Docusate Sodium (STOOL SOFTENER/LAXATIVE OR) Take by mouth.      aspirin 81 MG Oral Chew Tab Chew by mouth nightly.        Glucosamine-Chondroit-Vit C-Mn (GLUCOSAMINE 1500 COMPLEX) Oral Cap Take 1 tablet by mouth daily. 1200 mg daily       benzonatate 200 MG Oral Cap Take 1 capsule (200 mg total) by mouth 3 (three) times daily as needed for cough. 40 capsule 1    gabapentin 100 MG Oral Cap Start with 1 pill TID for 1 week, then 2 pills TID for another week, then 3 pills TID (Patient not taking: Reported on 4/18/2024) 270 capsule 5    XARELTO 20 MG Oral Tab Take 1 tablet (20 mg total) by mouth daily. Resume on 5/14 at night (Patient not taking: Reported on 8/8/2024) 90 tablet 1     Allergies:   Allergies    Allergen Reactions    Cat Hair Extract      Other reaction(s): CAT HAIR STD EXTRACT    Sulfa Antibiotics      Other reaction(s): SULFA (SULFONAMIDE ANTIBIOTICS)       Past Medical History:    Allergic rhinitis    Asthma with COPD (chronic obstructive pulmonary disease) (HCC)    Atherosclerosis of coronary artery    Atrial fibrillation (HCC)    Atrial flutter (HCC)    BCC (basal cell carcinoma of skin)    left side neck    BCC (basal cell carcinoma)    Left chest    Benign prostatic hypertrophy    Closed fracture of left distal radius    Colon adenoma    Coronary artery disease    Per N coronary stents    Disorder of thyroid    Essential hypertension    Extrinsic asthma, unspecified    H/O hernia repair    Hernia, inguinal, bilateral    Herniated disc    Herniated disc    High blood pressure    High cholesterol    Hyperlipidemia    Inguinal hernia recurrent unilateral    Pulmonary hypertension (HCC)    Pulmonary hypertension (HCC)    S/P ablation of atrial flutter    Sleep apnea    Thyroid nodule    Per NG: surgery x2     Thyroid nodule    TN (trigeminal neuralgia)    Per NG: Anti-seizure medication    TN (trigeminal neuralgia)     Past Surgical History:   Procedure Laterality Date    Angioplasty (coronary)      Cabg      CABG and aortic valve replacement     Cardioversion      Cath ablation  2019    for a-flutter in [place]    Colonoscopy      Colonoscopy  2019    Colonoscopy N/A 2019    Procedure: COLONOSCOPY;  Surgeon: River Zuniga MD;  [facility] ENDOSCOPY    Ep cardioversion 1x  8/4/2021         Ep cardioversion 1x  10/18/2021         Ep pulmonary vein/a-fib ablation  10/6/2021         Exc skin benig 2.1-3cm remaindr body  08/10/2022    Hernia surgery      Other surgical history      trigeminal neuralgia surgery    Other surgical history      partial thyroidectomy for thyroid nodule    Replace aortic valve w/byp      Repr cmpl wnd head,fac,hand 2.6-7.5  08/10/2022     Tonsillectomy       Social History     Socioeconomic History    Marital status:      Spouse name: Not on file    Number of children: Not on file    Years of education: Not on file    Highest education level: Not on file   Occupational History    Not on file   Tobacco Use    Smoking status: Former     Current packs/day: 0.00     Average packs/day: 0.3 packs/day for 2.0 years (0.5 ttl pk-yrs)     Types: Cigarettes     Start date: 10/5/1967     Quit date: 10/5/1969     Years since quittin.9     Passive exposure: Past    Smokeless tobacco: Never   Vaping Use    Vaping status: Never Used   Substance and Sexual Activity    Alcohol use: Yes     Alcohol/week: 0.8 standard drinks of alcohol     Types: 1 Cans of beer per week     Comment: Occasionally    Drug use: Never    Sexual activity: Not on file   Other Topics Concern    Grew up on a farm Not Asked    History of tanning Not Asked    Outdoor occupation Not Asked    Pt has a pacemaker Yes    Pt has a defibrillator Yes    Reaction to local anesthetic No   Social History Narrative    Not on file     Social Determinants of Health     Financial Resource Strain: Low Risk  (8/10/2021)    Received from [facility]    Overall Financial Resource Strain (CARDIA)     Difficulty of Paying Living Expenses: Not hard at all   Food Insecurity: Not on file   Transportation Needs: No Transportation Needs (8/10/2021)    Received from [facility]    PRAPARE - Transportation     Lack of Transportation (Medical): No     Lack of Transportation (Non-Medical): No   Physical Activity: Not on file   Stress: Not on file   Social Connections: Not on file   Housing Stability: Not on file     Family History   Problem Relation Age of Onset    Prostate Cancer Father     Cancer Father         prostate cancer    Stroke Mother         Per NG: TIA's    Other (Other) Mother         vascular dementia    Skin cancer Maternal Grandmother      Cancer Maternal Grandmother     Kidney Disease Maternal Grandfather         Per NG kidney removal    Skin cancer Maternal Grandfather     Prostate Cancer Paternal Grandfather     Cancer Sister         unknown cancer    Other (Other) Sister         eczema    Heart Disease Other         PEr NG: Family history of CAD       There were no vitals filed for this visit.    HPI:    Chief Complaint   Patient presents with    Full Skin Exam     LOV 2/08/24. Pt presents for FBSE. Has c/o itchy red spots on his chest. Tried cortisone, which relieved the itching. Has personal Hx of Ak's and BCC.     Past notes/ records and appropriate/relevant lab results including pathology and past body maps reviewed. Updated and new information noted in current visit.     Patient with history of excision left neck BCC with Dr. Robert healing well.6/22  Right neck and chest  For skin exam.  No particular lesions of concern is outdoors does use sunscreen    Patient presents with concerns above.    Patient has been in their usual state of health.  History, medications, allergies reviewed as noted.      ROS:  Denies any other systemic complaints.  No new or changeing lesions other than noted above. No fevers, chills, night sweats, unusual sun sensitivity.  No other skin complaints.        History, medications, allergies reviewed as noted.       Physical Examination:     Well-developed well-nourished patient alert oriented in no acute distress.  Exam total-body performed, including scalp, head, neck, face,nails, hair, external eyes, including conjunctival mucosa, eyelids, lips external ears, back, chest,/ breasts, axillae,  abdomen, arms, legs, palms.     Multiple light to medium brown, well marginated, uniformly pigmented, macules and papules 6 mm and less scattered on exam. pigmented lesions examined with dermoscopy benign-appearing patterns.     Waxy tannish keratotic papules scattered, cherry-red vascular papules scattered.    See map  today's date for lesions noted .      Otherwise remarkable for lesions as noted on map.  See Assessment /Plan for additional history and physical exam also:    Assessment / plan:    No orders of the defined types were placed in this encounter.      Meds & Refills for this Visit:  Requested Prescriptions      No prescriptions requested or ordered in this encounter         Encounter Diagnoses   Name Primary?    Seborrheic keratoses Yes    AK (actinic keratosis)     Multiple nevi     Lentigo     Benign neoplasm of skin, unspecified location     Encounter for follow-up surveillance of skin cancer     Verruca     Insect bite, unspecified site, initial encounter      Patient seen for follow-up long-term monitoring, treatment of  Skin cancer actinic keratoses, nevi  Plan of care:  ongoing surveillance, monitoring including regular follow-up due to longer term risk of recurrence, new lesions.  See previous notes.  There is a longitudinal care relationship with me, the care plan reflects the ongoing nature of the continuous relationship of care, and the medical record indicates that there is ongoing treatment of a serious/complex medical condition which I am currently managing.  is Applicable        Insect bites likely [place]'s itch mites versus spider bite.  Hydrocortisone, monitor, if worsening will add topical steroids.  Area is healing presently    Left chest BCC right neck history of BCC post excision no recurrence doing well.    Actinic Keratoses.  Precancerous nature discussed. Sun protection, sunscreen/ blocks encouraged .  Monitoring for new lesions.  Sun damage additional recurrent and new actinic keratoses, skin cancers may occur in areas of prior actinic keratoses, related to past sun exposure to minimize current sun exposure.  Sunscreen applied consistently regularly, reapplication and sun protection while driving recommended.    Otherwise exam stable doing well    SKs over the scalp, back, lesion on  hip  Waxy tan keratotic papules lesions in areas of concern as noted reassurance given.  Benign nature discussed.  Possibility of cryo, alphahydroxy acids over-the-counter retinol's discussed.    Notes darker lesion on flank consistent with benign seborrheic keratosis, previous nevus noted at back stable    Multiple benign keratoses as noted.  Persistent verrucous lesions.  Hands stable continue topicals as needed    Pigmented lesions including macule 6 mm left plantar foot, back chest medium brown lesions.  Stable, benign patterns on dermoscopy recommend observation.  5 mm medium brown papule mid left back, 3 mm right lower back as well as lesions on left chest observe.      Few scattered waxy tan-brown papules reassurance regarding benign keratoses more extensive lentigines.  Waxy tan keratotic papules lesions in areas of concern as noted reassurance given.  Benign nature discussed.  Possibility of cryo, alphahydroxy acids over-the-counter retinol's discussed.    Observe lesion at left nasal sidewall appears benign intradermal nevus versus cyst possibility of scarring discussed.  Consider biopsy in future if this continues to change.  Stable    No other susupicious lesions on todays  exam.      6 mm tan macule at plantar left foot consistent with benign nevus recommend observation.    Other waxy tan papules over the face head neck hands no other suspicious lesions patient should have skin check over the summer.    No other susupicious lesions on todays  exam.      Please refer to map for specific lesions.  See additional diagnoses.  Pros cons of various therapies, risks benefits discussed.Pathophysiology discussed with patient.  Therapeutic options reviewed.  See  Medications in grid.  Instructions reviewed at length.    Benign nevi, seborrheic  keratoses, cherry angiomas:  Reassurance regarding other benign skin lesions.Signs and symptoms of skin cancer, ABCDE's of melanoma discussed with patient. Sunscreen use,  sun protection, self exams reviewed.  Followup as noted RTC routine checkup 6 mos - one year or p.r.n.     Note to patient and family: The 21st Century Cures Act makes medical notes like these available to patients. However, be advised this is a medical document. It is intended as aykh-qe-cvja communication and monitoring of a patient's care needs. It is written in medical language and may contain abbreviations or verbiage that are unfamiliar. It may appear blunt or direct. Medical documents are intended to carry relevant information, facts as evident and the clinical opinion of the practitioner.      The patient indicates understanding of these issues and agrees to the plan.  The patient is asked to return as noted in follow-up/ above.    This note was generated using Dragon voice recognition software.  Please contact me regarding any confusion resulting from errors in recognition.   Note to patient and family: The 21st Century Cures Act makes medical notes like these available to patients. However, be advised this is a medical document. It is intended as gyaz-fi-ljfg communication and monitoring of a patient's care needs. It is written in medical language and may contain abbreviations or verbiage that are unfamiliar. It may appear blunt or direct. Medical documents are intended to carry relevant information, facts as evident and the clinical opinion of the practitioner.

## 2024-08-19 ENCOUNTER — LAB ENCOUNTER (OUTPATIENT)
Dept: LAB | Age: 79
End: 2024-08-19
Attending: INTERNAL MEDICINE
Payer: MEDICARE

## 2024-08-19 DIAGNOSIS — I48.20 CHRONIC ATRIAL FIBRILLATION (HCC): ICD-10-CM

## 2024-08-19 LAB
INR BLD: 1.42 (ref 0.8–1.2)
PROTHROMBIN TIME: 18.2 SECONDS (ref 11.6–14.8)

## 2024-08-19 PROCEDURE — 85610 PROTHROMBIN TIME: CPT

## 2024-08-19 PROCEDURE — 36415 COLL VENOUS BLD VENIPUNCTURE: CPT

## 2024-08-21 ENCOUNTER — LAB ENCOUNTER (OUTPATIENT)
Dept: LAB | Age: 79
End: 2024-08-21
Attending: INTERNAL MEDICINE
Payer: MEDICARE

## 2024-08-21 DIAGNOSIS — I48.91 ATRIAL FIBRILLATION, UNSPECIFIED TYPE (HCC): ICD-10-CM

## 2024-08-21 LAB
INR BLD: 1.82 (ref 0.8–1.2)
PROTHROMBIN TIME: 22.2 SECONDS (ref 11.6–14.8)

## 2024-08-21 PROCEDURE — 36415 COLL VENOUS BLD VENIPUNCTURE: CPT

## 2024-08-21 PROCEDURE — 85610 PROTHROMBIN TIME: CPT

## 2024-08-23 ENCOUNTER — LAB ENCOUNTER (OUTPATIENT)
Dept: LAB | Age: 79
End: 2024-08-23
Attending: INTERNAL MEDICINE
Payer: MEDICARE

## 2024-08-23 DIAGNOSIS — I48.91 ATRIAL FIBRILLATION, UNSPECIFIED TYPE (HCC): ICD-10-CM

## 2024-08-23 LAB
INR BLD: 2.01 (ref 0.8–1.2)
PROTHROMBIN TIME: 24.1 SECONDS (ref 11.6–14.8)

## 2024-08-23 PROCEDURE — 85610 PROTHROMBIN TIME: CPT

## 2024-08-23 PROCEDURE — 36415 COLL VENOUS BLD VENIPUNCTURE: CPT

## 2024-08-27 ENCOUNTER — LAB ENCOUNTER (OUTPATIENT)
Dept: LAB | Age: 79
End: 2024-08-27
Attending: INTERNAL MEDICINE
Payer: MEDICARE

## 2024-08-27 DIAGNOSIS — I48.20 CHRONIC ATRIAL FIBRILLATION (HCC): ICD-10-CM

## 2024-08-27 LAB
INR BLD: 2.07 (ref 0.8–1.2)
PROTHROMBIN TIME: 24.6 SECONDS (ref 11.6–14.8)

## 2024-08-27 PROCEDURE — 85610 PROTHROMBIN TIME: CPT

## 2024-08-27 PROCEDURE — 36415 COLL VENOUS BLD VENIPUNCTURE: CPT

## 2024-08-30 NOTE — TELEPHONE ENCOUNTER
Patient called requesting a super bill for previous acupuncture therapy services. Printed and sent to home address, left message for patient and advised this is also available now on his my chart.    [FreeTextEntry1] : ###  -The dizziness he is experiencing in recent weeks does not seem to be postural related. In the past he has a history of postural dizziness and orthostasis. He is on chronic midodrine -he exercises and has not noted reduced tolerance which points away from a lack of chronic competence. He denies syncope or a sensation that he is going to pass out.  However, given the episodic nature of the recent dizziness which can come at rest, I cannot exclude an intermittent bradyarrhythmia.  Therefore: -will place cardiac monitor -repeat TTE

## 2024-09-03 ENCOUNTER — LAB ENCOUNTER (OUTPATIENT)
Dept: LAB | Age: 79
End: 2024-09-03
Attending: INTERNAL MEDICINE
Payer: MEDICARE

## 2024-09-03 DIAGNOSIS — I48.91 ATRIAL FIBRILLATION, UNSPECIFIED TYPE (HCC): ICD-10-CM

## 2024-09-03 LAB
INR BLD: 2.32 (ref 0.8–1.2)
PROTHROMBIN TIME: 26.9 SECONDS (ref 11.6–14.8)

## 2024-09-03 PROCEDURE — 36415 COLL VENOUS BLD VENIPUNCTURE: CPT

## 2024-09-03 PROCEDURE — 85610 PROTHROMBIN TIME: CPT

## 2024-09-06 ENCOUNTER — TELEPHONE (OUTPATIENT)
Facility: CLINIC | Age: 79
End: 2024-09-06

## 2024-09-06 NOTE — TELEPHONE ENCOUNTER
----- Message from Tania MORATAYA sent at 2019 12:06 PM CST -----  Regardin yr CLN recall  Entered into Epic:Recall colon in 5 years per . Last Colon done 2019, next due 24. HM updated.

## 2024-09-17 ENCOUNTER — LAB ENCOUNTER (OUTPATIENT)
Dept: LAB | Age: 79
End: 2024-09-17
Attending: INTERNAL MEDICINE
Payer: MEDICARE

## 2024-09-17 DIAGNOSIS — I48.20 CHRONIC ATRIAL FIBRILLATION (HCC): ICD-10-CM

## 2024-09-17 LAB
INR BLD: 1.93 (ref 0.8–1.2)
PROTHROMBIN TIME: 23.3 SECONDS (ref 11.6–14.8)

## 2024-09-17 PROCEDURE — 85610 PROTHROMBIN TIME: CPT

## 2024-09-17 PROCEDURE — 36415 COLL VENOUS BLD VENIPUNCTURE: CPT

## 2024-09-19 ENCOUNTER — TELEPHONE (OUTPATIENT)
Facility: CLINIC | Age: 79
End: 2024-09-19

## 2024-09-19 RX ORDER — WARFARIN SODIUM 2.5 MG/1
7.5 TABLET ORAL NIGHTLY
COMMUNITY

## 2024-09-19 NOTE — TELEPHONE ENCOUNTER
Colonoscopy Report           Freddie Bautista      1945 Age 74 year old   PCP Tomas Beaulieu MD Endoscopist River Zuniga MD      Date of procedure: 19     Procedure: Colonoscopy w/ snare polypectomy     Pre-operative diagnosis: colorectal cancer screening     Post-operative diagnosis: colon polyp, diverticulosis, hemorrhoids     Sedation: monitored anesthesia care (MAC)     Consent: We discussed the risks/benefits and alternatives to this procedure, as well as the planned sedation. Informed consent was obtained from the patient after the risks of the procedure were discussed, including but not limited to bleeding, perforation, aspiration, infection, or possibility of a missed lesion as well as the risks of anesthesia including but not limited to cardiopulmonary complications. The patient signed informed consent and elected to proceed with Colonoscopy with intervention [i.e. Biopsy, control of bleeding, dilatation, polypectomy, endoscopic mucosal resection, etc.] as indicated.     Colonoscopy procedure: Once an adequate level of sedation was obtained a digital rectal exam was completed revealing normal tone and no masses palpated. Then the lubricated tip of the [id number] diagnostic video colonoscope was carefully inserted and advanced without difficulty to the cecum using the air insufflation technique (only Co2 was used for insufflation). The cecum was identified by localizing the trifold, the appendix and the ileocecal valve. Withdrawal was begun with thorough washing and careful examination of the colonic walls and folds. The ascending colon was viewed twice in the forward view. Photodocumentation was obtained. The bowel prep was good. Views of the colon were good with washing. Withdrawal time was 16 minutes.     Air was then withdrawn and the endoscope was removed. The patient tolerated the procedure well. There were no immediate postoperative complications. The patient’s vital signs  were monitored throughout the procedure and remained stable.     Estimated blood loss: insignificant     Specimens collected:  Colon polyps     Complications: none      Colonoscopy findings:  Terminal ileum: normal mucosa     Cecum: normal mucosa and vascular pattern     Ascending colon: there was a 5-6 mm sessile polyp removed by cold snare polypectomy. Otherwise, normal mucosa and vascular pattern     Transverse colon: normal mucosa and vascular pattern     Descending colon: normal mucosa and vascular pattern     Sigmoid colon: there was moderate diverticulosis. Otherwise, normal mucosa and vascular pattern     Rectum: retroflexed view showed small non-bleeding hemorrhoids. Otherwise, normal mucosa and vascular pattern.     Impression:  1. A 5-6 mm ascending colon polyp removed  2. Moderate diverticulosis of the sigmoid colon  3. Small non-bleeding hemorrhoids  4. Otherwise, unremarkable ileoclonoscopy     Recommend:  1. Await pathology.   2. Consider repeat colonoscopy in 5 years if the polyp is adenoma and depending on your health and preferences at that time.  If new signs or symptoms develop, procedure may need to be repeated sooner.   3. Continue your current medications  4. Increase fiber in your diet  5. Ok to resume anti-coagulation in 24 hours  6. Follow up with your primary care physician on a routine basis     >>>If biopsies were performed and you have not received your pathology results either by phone or letter within 2 weeks, please call our office at [phone].     River Zuniga MD  [facility] - Gastroenterology  11/18/2019                           Electronically signed by River Zuniga MD at 11/18/2019 11:08 AM  Final Diagnosis:      Ascending colon polyp:   Tubular adenoma fragments.

## 2024-09-19 NOTE — TELEPHONE ENCOUNTER
Colon recall.  Medications, pharmacy, and allergies reviewed.   Please advise on colonoscopy and bowel prep orders.     › H/W/BMI: 5'7\"/183lbs/28.66    Special comments/notes:  Recall    Last Procedure, Date, MD:   Colonoscopy, 11/18/19, MG   Last diagnosis: Tubular adenoma   Recalled for (mth/yrs): 5 years   Sedation used previously: MAC   Last Prep Used: Trilyte    Quality of Prep: Good      Telephone Colon Screening Questionnaire Yes No   Are you currently experiencing any new/abnormal GI symptoms? [] [x]   Any issues with anesthesia? [] [x]   If yes, explain:      Stroke, heart attack or stent placement in the last 12 months:  [] [x]   History of  respiratory issues/oxygen/CHE/COPD: [] [x]   If yes, CPAP/BiPAP:     History of devices: Implantable cardioverter-defibrillator placed last year, stents in 2000's [x] []   History of cardiac/CVA/(MI/stroke): [] [x]     Medications Yes  No   Anticoagulants (except Aspirin): Coumadin [x] []   Diabetic Meds [] [x]   Weight loss meds (phentermine/vyvanse/saxsenda/etc): [] [x]   Iron/herbal/multivitamin supplement: [] [x]   Usage of marijuana, CBD &/or vape products: [] [x]

## 2024-09-24 ENCOUNTER — LAB ENCOUNTER (OUTPATIENT)
Dept: LAB | Age: 79
End: 2024-09-24
Attending: INTERNAL MEDICINE
Payer: MEDICARE

## 2024-09-24 DIAGNOSIS — I48.20 CHRONIC ATRIAL FIBRILLATION (HCC): ICD-10-CM

## 2024-09-24 LAB
INR BLD: 1.61 (ref 0.8–1.2)
PROTHROMBIN TIME: 20.1 SECONDS (ref 11.6–14.8)

## 2024-09-24 PROCEDURE — 85610 PROTHROMBIN TIME: CPT

## 2024-09-24 PROCEDURE — 36415 COLL VENOUS BLD VENIPUNCTURE: CPT

## 2024-09-24 NOTE — TELEPHONE ENCOUNTER
I reviewed his chart. Recent surgery at [place] in August 2024 for complicated hiatal hernia. I don't see recent follow up with his primary or cardiology since. I recommend a routine follow up with me in the office for discussion of proceeding with routine/surveillance colonoscopy in discussing benefits and risks.     Thanks    MD Jeramie [facility] - Gastroenterology  9/24/2024  4:04 AM

## 2024-09-25 NOTE — TELEPHONE ENCOUNTER
Left detailed message to call back.    If patient calls back please schedule next available appt with Dr. Zuniga.    Route back if confirm/decline.

## 2024-09-26 NOTE — TELEPHONE ENCOUNTER
Your appointments       Date & Time Appointment Department ([place])    Jan 02, 2025 10:00 AM CST Consult with River Zuniga MD [facility] ([facility])

## 2024-10-02 ENCOUNTER — LAB ENCOUNTER (OUTPATIENT)
Dept: LAB | Age: 79
End: 2024-10-02
Attending: INTERNAL MEDICINE
Payer: MEDICARE

## 2024-10-02 DIAGNOSIS — I48.20 CHRONIC ATRIAL FIBRILLATION (HCC): ICD-10-CM

## 2024-10-02 LAB
INR BLD: 1.65 (ref 0.8–1.2)
PROTHROMBIN TIME: 20.6 SECONDS (ref 11.6–14.8)

## 2024-10-02 PROCEDURE — 85610 PROTHROMBIN TIME: CPT

## 2024-10-02 PROCEDURE — 36415 COLL VENOUS BLD VENIPUNCTURE: CPT

## 2024-10-10 ENCOUNTER — LAB ENCOUNTER (OUTPATIENT)
Dept: LAB | Age: 79
End: 2024-10-10
Attending: INTERNAL MEDICINE
Payer: MEDICARE

## 2024-10-10 DIAGNOSIS — I48.91 ATRIAL FIBRILLATION, UNSPECIFIED TYPE (HCC): ICD-10-CM

## 2024-10-10 DIAGNOSIS — I10 ESSENTIAL HYPERTENSION, BENIGN: ICD-10-CM

## 2024-10-10 DIAGNOSIS — E78.00 PURE HYPERCHOLESTEROLEMIA: ICD-10-CM

## 2024-10-10 DIAGNOSIS — I50.22 CHRONIC SYSTOLIC CHF (CONGESTIVE HEART FAILURE) (HCC): ICD-10-CM

## 2024-10-10 DIAGNOSIS — I25.10 CORONARY ATHEROSCLEROSIS OF NATIVE CORONARY ARTERY: ICD-10-CM

## 2024-10-10 DIAGNOSIS — I25.9 CHRONIC ISCHEMIC HEART DISEASE: Primary | ICD-10-CM

## 2024-10-10 LAB
ALBUMIN SERPL-MCNC: 4.4 G/DL (ref 3.2–4.8)
ALBUMIN/GLOB SERPL: 1.9 {RATIO} (ref 1–2)
ALP LIVER SERPL-CCNC: 131 U/L
ALT SERPL-CCNC: 23 U/L
ANION GAP SERPL CALC-SCNC: 7 MMOL/L (ref 0–18)
AST SERPL-CCNC: 22 U/L (ref ?–34)
BILIRUB SERPL-MCNC: 0.6 MG/DL (ref 0.2–1.1)
BUN BLD-MCNC: 10 MG/DL (ref 9–23)
BUN/CREAT SERPL: 13.5 (ref 10–20)
CALCIUM BLD-MCNC: 9.9 MG/DL (ref 8.7–10.4)
CHLORIDE SERPL-SCNC: 108 MMOL/L (ref 98–112)
CHOLEST SERPL-MCNC: 167 MG/DL (ref ?–200)
CO2 SERPL-SCNC: 27 MMOL/L (ref 21–32)
CREAT BLD-MCNC: 0.74 MG/DL
EGFRCR SERPLBLD CKD-EPI 2021: 93 ML/MIN/1.73M2 (ref 60–?)
FASTING PATIENT LIPID ANSWER: YES
FASTING STATUS PATIENT QL REPORTED: YES
GLOBULIN PLAS-MCNC: 2.3 G/DL (ref 2–3.5)
GLUCOSE BLD-MCNC: 114 MG/DL (ref 70–99)
HDLC SERPL-MCNC: 60 MG/DL (ref 40–59)
INR BLD: 2.09 (ref 0.8–1.2)
LDLC SERPL CALC-MCNC: 90 MG/DL (ref ?–100)
NONHDLC SERPL-MCNC: 107 MG/DL (ref ?–130)
OSMOLALITY SERPL CALC.SUM OF ELEC: 294 MOSM/KG (ref 275–295)
POTASSIUM SERPL-SCNC: 4.1 MMOL/L (ref 3.5–5.1)
PROT SERPL-MCNC: 6.7 G/DL (ref 5.7–8.2)
PROTHROMBIN TIME: 24.8 SECONDS (ref 11.6–14.8)
SODIUM SERPL-SCNC: 142 MMOL/L (ref 136–145)
TRIGL SERPL-MCNC: 94 MG/DL (ref 30–149)
VLDLC SERPL CALC-MCNC: 15 MG/DL (ref 0–30)

## 2024-10-10 PROCEDURE — 36415 COLL VENOUS BLD VENIPUNCTURE: CPT

## 2024-10-10 PROCEDURE — 85610 PROTHROMBIN TIME: CPT

## 2024-10-10 PROCEDURE — 80061 LIPID PANEL: CPT

## 2024-10-10 PROCEDURE — 80053 COMPREHEN METABOLIC PANEL: CPT

## 2024-10-18 ENCOUNTER — LAB ENCOUNTER (OUTPATIENT)
Dept: LAB | Age: 79
End: 2024-10-18
Attending: INTERNAL MEDICINE
Payer: MEDICARE

## 2024-10-18 DIAGNOSIS — I48.91 ATRIAL FIBRILLATION, UNSPECIFIED TYPE (HCC): ICD-10-CM

## 2024-10-18 LAB
INR BLD: 2.14 (ref 0.8–1.2)
PROTHROMBIN TIME: 25.3 SECONDS (ref 11.6–14.8)

## 2024-10-18 PROCEDURE — 85610 PROTHROMBIN TIME: CPT

## 2024-10-18 PROCEDURE — 36415 COLL VENOUS BLD VENIPUNCTURE: CPT

## 2024-10-31 ENCOUNTER — LAB ENCOUNTER (OUTPATIENT)
Dept: LAB | Age: 79
End: 2024-10-31
Attending: INTERNAL MEDICINE
Payer: MEDICARE

## 2024-10-31 DIAGNOSIS — I48.20 CHRONIC ATRIAL FIBRILLATION (HCC): ICD-10-CM

## 2024-10-31 LAB
INR BLD: 2.16 (ref 0.8–1.2)
PROTHROMBIN TIME: 25.2 SECONDS (ref 11.6–14.8)

## 2024-10-31 PROCEDURE — 36415 COLL VENOUS BLD VENIPUNCTURE: CPT

## 2024-10-31 PROCEDURE — 85610 PROTHROMBIN TIME: CPT

## 2024-11-27 ENCOUNTER — LAB ENCOUNTER (OUTPATIENT)
Dept: LAB | Age: 79
End: 2024-11-27
Attending: INTERNAL MEDICINE
Payer: MEDICARE

## 2024-11-27 DIAGNOSIS — I48.20 CHRONIC ATRIAL FIBRILLATION (HCC): ICD-10-CM

## 2024-11-27 LAB
INR BLD: 2.13 (ref 0.8–1.2)
PROTHROMBIN TIME: 24.9 SECONDS (ref 11.6–14.8)

## 2024-11-27 PROCEDURE — 85610 PROTHROMBIN TIME: CPT

## 2024-11-27 PROCEDURE — 36415 COLL VENOUS BLD VENIPUNCTURE: CPT

## 2024-12-18 ENCOUNTER — OFFICE VISIT (OUTPATIENT)
Dept: INTERNAL MEDICINE CLINIC | Facility: CLINIC | Age: 79
End: 2024-12-18

## 2024-12-18 VITALS
DIASTOLIC BLOOD PRESSURE: 75 MMHG | TEMPERATURE: 98 F | WEIGHT: 196 LBS | OXYGEN SATURATION: 98 % | HEIGHT: 67 IN | HEART RATE: 70 BPM | SYSTOLIC BLOOD PRESSURE: 132 MMHG | BODY MASS INDEX: 30.76 KG/M2

## 2024-12-18 DIAGNOSIS — B96.89 ACUTE BACTERIAL SINUSITIS: Primary | ICD-10-CM

## 2024-12-18 DIAGNOSIS — J01.90 ACUTE BACTERIAL SINUSITIS: Primary | ICD-10-CM

## 2024-12-18 PROCEDURE — 1159F MED LIST DOCD IN RCRD: CPT | Performed by: STUDENT IN AN ORGANIZED HEALTH CARE EDUCATION/TRAINING PROGRAM

## 2024-12-18 PROCEDURE — 99213 OFFICE O/P EST LOW 20 MIN: CPT | Performed by: STUDENT IN AN ORGANIZED HEALTH CARE EDUCATION/TRAINING PROGRAM

## 2024-12-18 PROCEDURE — 1160F RVW MEDS BY RX/DR IN RCRD: CPT | Performed by: STUDENT IN AN ORGANIZED HEALTH CARE EDUCATION/TRAINING PROGRAM

## 2024-12-18 PROCEDURE — 3008F BODY MASS INDEX DOCD: CPT | Performed by: STUDENT IN AN ORGANIZED HEALTH CARE EDUCATION/TRAINING PROGRAM

## 2024-12-18 PROCEDURE — 3078F DIAST BP <80 MM HG: CPT | Performed by: STUDENT IN AN ORGANIZED HEALTH CARE EDUCATION/TRAINING PROGRAM

## 2024-12-18 PROCEDURE — 3075F SYST BP GE 130 - 139MM HG: CPT | Performed by: STUDENT IN AN ORGANIZED HEALTH CARE EDUCATION/TRAINING PROGRAM

## 2024-12-18 RX ORDER — FLUTICASONE PROPIONATE 50 MCG
2 SPRAY, SUSPENSION (ML) NASAL DAILY
Qty: 1 EACH | Refills: 11 | Status: SHIPPED | OUTPATIENT
Start: 2024-12-18

## 2024-12-18 RX ORDER — AZELASTINE HYDROCHLORIDE 137 UG/1
1-2 SPRAY, METERED NASAL 2 TIMES DAILY
Qty: 30 ML | Refills: 11 | Status: SHIPPED | OUTPATIENT
Start: 2024-12-18

## 2024-12-18 RX ORDER — METHYLPREDNISOLONE 4 MG/1
TABLET ORAL
Qty: 1 EACH | Refills: 0 | Status: SHIPPED | OUTPATIENT
Start: 2024-12-18

## 2024-12-18 NOTE — PROGRESS NOTES
OFFICE NOTE     Patient ID: Freddie Bautista is a 79 year old male.  Today's Date: 12/18/24  Chief Complaint: Post Nasal Drip (Post nasal drip , wanting to cough to get mucous out x1 week)      Pt is a 80y/o male with significant PMHx of chronic A Fib, CAD s/p CABG, CHF, 3rd degree av block (followed by cardiology), Asthma with COPD, Pulmonary HTN, HLD, trigeminal neuralgia, thyroid nodule, history of pneumoperitoneum, BPH, allergic rhinitis, whom presents to clinic today     With sinus infection        Vitals:    12/18/24 1310   BP: 132/75   Pulse: 70   Temp: 97.9 °F (36.6 °C)   TempSrc: Oral   SpO2: 98%   Weight: 196 lb (88.9 kg)   Height: 5' 7\" (1.702 m)     body mass index is 30.7 kg/m².  BP Readings from Last 3 Encounters:   12/18/24 132/75   08/10/24 (!) 146/101   08/10/24 (!) 147/94     The 10-year ASCVD risk score (Carter CARR, et al., 2019) is: 32.4%    Values used to calculate the score:      Age: 79 years      Sex: Male      Is Non- : No      Diabetic: No      Tobacco smoker: No      Systolic Blood Pressure: 132 mmHg      Is BP treated: Yes      HDL Cholesterol: 60 mg/dL      Total Cholesterol: 167 mg/dL      Medications reviewed:  Current Outpatient Medications   Medication Sig Dispense Refill    amoxicillin clavulanate 875-125 MG Oral Tab Take 1 tablet by mouth 2 (two) times daily for 10 days. 20 tablet 0    azelastine 137 MCG/SPRAY Nasal Solution 1-2 sprays by Nasal route in the morning and 1-2 sprays before bedtime. FOR SINUS SYMPTOMS/NASAL CONGESTION.. 30 mL 11    fluticasone propionate 50 MCG/ACT Nasal Suspension 2 sprays by Each Nare route daily. FOR NASAL CONGESTION/SINUS SYMPTOMS. 1 each 11    methylPREDNISolone 4 MG Oral Tablet Therapy Pack Take as directed with food. 1 each 0    warfarin 2.5 MG Oral Tab Take 3 tablets (7.5 mg total) by mouth nightly. Sat, Sunday-thurs (total 7.5mg)  Friday takes total 10mg)      levothyroxine 100 MCG Oral Tab Take 1 tablet (100  mcg total) by mouth daily. 90 tablet 3    carBAMazepine  MG Oral Tablet 12 Hr Take 1 tablet (400 mg total) by mouth 3 (three) times daily. 270 tablet 1    carvedilol 3.125 MG Oral Tab Take 1 tablet (3.125 mg total) by mouth 2 (two) times daily with meals. 60 tablet 5    sacubitril-valsartan 24-26 MG Oral Tab Take 1 tablet by mouth 2 (two) times daily.      Magnesium Oxide 400 (240 Mg) MG Oral Tab Take 1 tablet (400 mg total) by mouth daily.      atorvastatin 80 MG Oral Tab Take 1 tablet (80 mg total) by mouth nightly.      ascorbic acid 1000 MG Oral Tab Take 1 tablet (1,000 mg total) by mouth daily. 30 tablet 0    Vitamin D3, Cholecalciferol, 25 MCG Oral Tab Take 2 tablets (2,000 Units total) by mouth daily. 30 tablet 0    Multiple Vitamins-Minerals (MERLIN MULTIVITAMIN FOR MEN) Oral Tab Take 1 tablet by mouth daily.      zinc sulfate 220 (50 Zn) MG Oral Cap Take 50 mg by mouth daily.      Sennosides-Docusate Sodium (STOOL SOFTENER/LAXATIVE OR) Take by mouth.      aspirin 81 MG Oral Chew Tab Chew by mouth nightly.        Glucosamine-Chondroit-Vit C-Mn (GLUCOSAMINE 1500 COMPLEX) Oral Cap Take 1 tablet by mouth daily. 1200 mg daily       benzonatate 200 MG Oral Cap Take 1 capsule (200 mg total) by mouth 3 (three) times daily as needed for cough. 40 capsule 1    gabapentin 100 MG Oral Cap Start with 1 pill TID for 1 week, then 2 pills TID for another week, then 3 pills TID (Patient not taking: Reported on 4/18/2024) 270 capsule 5    XARELTO 20 MG Oral Tab Take 1 tablet (20 mg total) by mouth daily. Resume on 5/14 at night (Patient not taking: Reported on 12/18/2024) 90 tablet 1         Assessment & Plan    1. Acute bacterial sinusitis (Primary)  -     Amoxicillin-Pot Clavulanate; Take 1 tablet by mouth 2 (two) times daily for 10 days.  Dispense: 20 tablet; Refill: 0  -     Azelastine HCl; 1-2 sprays by Nasal route in the morning and 1-2 sprays before bedtime. FOR SINUS SYMPTOMS/NASAL CONGESTION..  Dispense: 30 mL;  Refill: 11  -     Fluticasone Propionate; 2 sprays by Each Nare route daily. FOR NASAL CONGESTION/SINUS SYMPTOMS.  Dispense: 1 each; Refill: 11  -     methylPREDNISolone; Take as directed with food.  Dispense: 1 each; Refill: 0  Patient presenting URI and now sinus congestion tenderness and erythematous nasal turbinates consistent with acute secondary acute bacterial rhinosinusitis.  Plan:  -take hot showers, drink tea and increase fluid intake   -Start Augmentin 1 tablet twice daily for 10 days  - pt educated while taking antibiotics should also take OTC priobiotics daily and/or natural probiotics such as greek yogurt/Kefir to help prevent development of C.Diff.  Flonase(Fluticazone generic acceptable) 1-2 puff each nostril twice daily for next month or till symptom resolves, Azelastine 1-2 puff each nostril twice daily for next month or till symptom resolves, Medrol dose pack for severe inflammation  -If no improvement also able to follow up with myself follow up in 10-14 days if needs antibiotic duration and agent if warranted     Follow Up: As needed/if symptoms worsen or Return in 10 days (on 12/28/2024), or if symptoms worsen or fail to improve..         Objective/ Results:   Physical Exam  Constitutional:       Appearance: He is well-developed.   HENT:      Nose: Congestion present.      Right Turbinates: Enlarged and swollen.      Left Turbinates: Enlarged and swollen.      Right Sinus: Frontal sinus tenderness present.      Left Sinus: Frontal sinus tenderness present.   Eyes:      Extraocular Movements: Extraocular movements intact.      Pupils: Pupils are equal, round, and reactive to light.   Cardiovascular:      Rate and Rhythm: Normal rate and regular rhythm.      Heart sounds: Normal heart sounds.   Pulmonary:      Effort: Pulmonary effort is normal.      Breath sounds: Normal breath sounds.   Abdominal:      General: Bowel sounds are normal.      Palpations: Abdomen is soft.   Skin:     General: Skin  is warm and dry.   Neurological:      Mental Status: He is alert and oriented to person, place, and time.      Deep Tendon Reflexes: Reflexes are normal and symmetric.        Reviewed:    Patient Active Problem List    Diagnosis    Pneumoperitoneum    Asthma with COPD (chronic obstructive pulmonary disease) (HCC)    AV block, 3rd degree (HCC)    Chronic systolic CHF (congestive heart failure) (HCC)    Trigeminal neuralgia    Chronic atrial fibrillation (HCC)    History of thyroid nodule    Prostate cancer screening    Long term current use of anticoagulant    CAD (coronary artery disease), native coronary artery    Valvular heart disease    BPH (benign prostatic hyperplasia)    Allergic rhinitis due to allergen    Essential hypertension, benign    Pure hypercholesterolemia      Allergies[1]     Social History     Socioeconomic History    Marital status:    Tobacco Use    Smoking status: Former     Current packs/day: 0.00     Average packs/day: 0.3 packs/day for 2.0 years (0.5 ttl pk-yrs)     Types: Cigarettes     Start date: 10/5/1967     Quit date: 10/5/1969     Years since quittin.2     Passive exposure: Past    Smokeless tobacco: Never   Vaping Use    Vaping status: Never Used   Substance and Sexual Activity    Alcohol use: Yes     Alcohol/week: 0.8 standard drinks of alcohol     Types: 1 Cans of beer per week     Comment: very rare    Drug use: Never   Other Topics Concern    Pt has a pacemaker Yes    Pt has a defibrillator Yes    Reaction to local anesthetic No     Social Drivers of Health     Financial Resource Strain: Low Risk  (2024)    Received from [facility]    Overall Financial Resource Strain (CARDIA)     Difficulty of Paying Living Expenses: Not hard at all   Food Insecurity: No Food Insecurity (2024)    Received from [facility]    Hunger Vital Sign     Worried About Running Out of Food in the Last Year: Never true     Ran Out of Food  in the Last Year: Never true   Transportation Needs: No Transportation Needs (11/30/2024)    Received from [facility]    PRAPARE - Transportation     Lack of Transportation (Medical): No     Lack of Transportation (Non-Medical): No   Housing Stability: Unknown (11/30/2024)    Received from [facility]    Housing Stability Vital Sign     Unable to Pay for Housing in the Last Year: No      Review of Systems   Constitutional: Negative.    HENT:  Positive for postnasal drip, rhinorrhea, sinus pressure, sinus pain, sore throat and voice change.    Respiratory: Negative.     Cardiovascular: Negative.    Gastrointestinal: Negative.    Skin: Negative.    Neurological: Negative.      All other systems negative unless otherwise stated in ROS or HPI above.       Jonny Stevens MD  Internal Medicine       Call office with any questions or seek emergency care if necessary.   Patient understands and agrees to follow directions and advice.      ----------------------------------------- PATIENT INSTRUCTIONS-----------------------------------------     There are no Patient Instructions on file for this visit.        The 21st Century Cures Act makes medical notes available to patients in the interest of transparency.  However, please be advised that this is a medical document.  It is intended as a peer to peer communication.  It is written in medical language and may contain abbreviations or verbiage that are technical and unfamiliar.  It may appear blunt or direct.  Medical documents are intended to carry relevant information, facts as evident, and the clinical opinion of the practitioner.          [1]   Allergies  Allergen Reactions    Cat Hair Extract      Other reaction(s): CAT HAIR STD EXTRACT    Sulfa Antibiotics      Other reaction(s): SULFA (SULFONAMIDE ANTIBIOTICS)

## 2024-12-24 ENCOUNTER — LAB ENCOUNTER (OUTPATIENT)
Dept: LAB | Facility: HOSPITAL | Age: 79
End: 2024-12-24
Attending: INTERNAL MEDICINE
Payer: MEDICARE

## 2024-12-24 DIAGNOSIS — I48.20 CHRONIC ATRIAL FIBRILLATION (HCC): ICD-10-CM

## 2024-12-24 LAB
INR BLD: 2.8 (ref 0.8–1.2)
PROTHROMBIN TIME: 30.8 SECONDS (ref 11.6–14.8)

## 2024-12-24 PROCEDURE — 85610 PROTHROMBIN TIME: CPT

## 2024-12-24 PROCEDURE — 36415 COLL VENOUS BLD VENIPUNCTURE: CPT

## 2025-01-01 NOTE — H&P
[facility] - Gastroenterology                                                                                                  Clinic History and Physical       Chief Complaint   Patient presents with    Colonoscopy Screening     Last colon was 2019; no current complaints; had diaphragm repaired last august     HPI:   Freddie Bautista is a 79 year old man with a history of BMI of 30, asthma with COPD, atrial fibrillation on anticoagulation, coronary artery disease status post CABG, aortic valve replacement, s/p Biv ICD, systolic heart failure, hypertension, hypercholesterolemia, trigeminal neuralgia, prior tobacco use, hernia surgery, tonsillectomy, skin cancer s/p resection, sleep apnea here regarding evaluation prior to colonoscopy    Says he has been doing well.  Did well with a large surgery at [place] in August to repair a hernia.  At this time denies any gastrointestinal issues or symptoms including but not limited to abdominal pain, issues/changes in bowel habits, blood in the stool.  No family history known of colorectal cancer.    History, Medications, Allergies, ROS:      Past Medical History:    Allergic rhinitis    Asthma with COPD (chronic obstructive pulmonary disease) (HCC)    Atherosclerosis of coronary artery    Atrial fibrillation (HCC)    Atrial flutter (HCC)    BCC (basal cell carcinoma of skin)    left side neck    BCC (basal cell carcinoma)    Left chest    Benign prostatic hypertrophy    Closed fracture of left distal radius    Colon adenoma    Coronary artery disease    Per N coronary stents    Disorder of thyroid    Essential hypertension    Extrinsic asthma, unspecified    H/O hernia repair    Hernia, inguinal, bilateral    Herniated disc    Herniated disc    High blood pressure    High cholesterol    Hyperlipidemia    Inguinal hernia recurrent unilateral    Pulmonary hypertension (HCC)     Pulmonary hypertension (HCC)    S/P ablation of atrial flutter    Sleep apnea    Thyroid nodule    Per NG: surgery x2     Thyroid nodule    TN (trigeminal neuralgia)    Per NG: Anti-seizure medication    TN (trigeminal neuralgia)      Past Surgical History:   Procedure Laterality Date    Angioplasty (coronary)      Cabg      CABG and aortic valve replacement     Cardioversion      Cath ablation  2019    for a-flutter in [place]    Colonoscopy      Colonoscopy  2019    Colonoscopy N/A 2019    Procedure: COLONOSCOPY;  Surgeon: River Zuniga MD;  Location: [facility] ENDOSCOPY    Ep cardioversion 1x  8/4/2021         Ep cardioversion 1x  10/18/2021         Ep pulmonary vein/a-fib ablation  10/6/2021         Exc skin benig 2.1-3cm remaindr body  08/10/2022    Hernia surgery      Other surgical history      trigeminal neuralgia surgery    Other surgical history      partial thyroidectomy for thyroid nodule    Replace aortic valve w/byp      Repr cmpl wnd head,fac,hand 2.6-7.5  08/10/2022    Tonsillectomy        Family Hx:   Family History   Problem Relation Age of Onset    Prostate Cancer Father     Cancer Father         prostate cancer    Stroke Mother         Per NG: TIA's    Other (Other) Mother         vascular dementia    Skin cancer Maternal Grandmother     Cancer Maternal Grandmother     Kidney Disease Maternal Grandfather         Per NG kidney removal    Skin cancer Maternal Grandfather     Prostate Cancer Paternal Grandfather     Cancer Sister         unknown cancer    Other (Other) Sister         eczema    Heart Disease Other         PEr NG: Family history of CAD      Social History:   Social History     Socioeconomic History    Marital status:    Tobacco Use    Smoking status: Former     Current packs/day: 0.00     Average packs/day: 0.3 packs/day for 2.0 years (0.5 ttl pk-yrs)     Types: Cigarettes     Start date: 10/5/1967     Quit date: 10/5/1969     Years since quittin.2      Passive exposure: Past    Smokeless tobacco: Never   Vaping Use    Vaping status: Never Used   Substance and Sexual Activity    Alcohol use: Yes     Alcohol/week: 0.8 standard drinks of alcohol     Types: 1 Cans of beer per week     Comment: very rare    Drug use: Never   Other Topics Concern    Pt has a pacemaker Yes    Pt has a defibrillator Yes    Reaction to local anesthetic No     Social Drivers of Health     Financial Resource Strain: Low Risk  (11/30/2024)    Received from [facility]    Overall Financial Resource Strain (CARDIA)     Difficulty of Paying Living Expenses: Not hard at all   Food Insecurity: No Food Insecurity (11/30/2024)    Received from [facility]    Hunger Vital Sign     Worried About Running Out of Food in the Last Year: Never true     Ran Out of Food in the Last Year: Never true   Transportation Needs: No Transportation Needs (11/30/2024)    Received from [facility]    PRAPARE - Transportation     Lack of Transportation (Medical): No     Lack of Transportation (Non-Medical): No   Housing Stability: Unknown (11/30/2024)    Received from [facility]    Housing Stability Vital Sign     Unable to Pay for Housing in the Last Year: No        Medications (Active prior to today's visit):  Current Outpatient Medications   Medication Sig Dispense Refill    azelastine 137 MCG/SPRAY Nasal Solution 1-2 sprays by Nasal route in the morning and 1-2 sprays before bedtime. FOR SINUS SYMPTOMS/NASAL CONGESTION.. 30 mL 11    fluticasone propionate 50 MCG/ACT Nasal Suspension 2 sprays by Each Nare route daily. FOR NASAL CONGESTION/SINUS SYMPTOMS. 1 each 11    methylPREDNISolone 4 MG Oral Tablet Therapy Pack Take as directed with food. 1 each 0    warfarin 2.5 MG Oral Tab Take 3 tablets (7.5 mg total) by mouth nightly. Sat, Sunday-thurs (total 7.5mg)  Friday takes total 10mg)      benzonatate 200 MG Oral Cap Take 1 capsule  (200 mg total) by mouth 3 (three) times daily as needed for cough. 40 capsule 1    levothyroxine 100 MCG Oral Tab Take 1 tablet (100 mcg total) by mouth daily. 90 tablet 3    carBAMazepine  MG Oral Tablet 12 Hr Take 1 tablet (400 mg total) by mouth 3 (three) times daily. 270 tablet 1    gabapentin 100 MG Oral Cap Start with 1 pill TID for 1 week, then 2 pills TID for another week, then 3 pills TID (Patient not taking: Reported on 4/18/2024) 270 capsule 5    XARELTO 20 MG Oral Tab Take 1 tablet (20 mg total) by mouth daily. Resume on 5/14 at night (Patient not taking: Reported on 12/18/2024) 90 tablet 1    carvedilol 3.125 MG Oral Tab Take 1 tablet (3.125 mg total) by mouth 2 (two) times daily with meals. 60 tablet 5    sacubitril-valsartan 24-26 MG Oral Tab Take 1 tablet by mouth 2 (two) times daily.      Magnesium Oxide 400 (240 Mg) MG Oral Tab Take 1 tablet (400 mg total) by mouth daily.      atorvastatin 80 MG Oral Tab Take 1 tablet (80 mg total) by mouth nightly.      ascorbic acid 1000 MG Oral Tab Take 1 tablet (1,000 mg total) by mouth daily. 30 tablet 0    Vitamin D3, Cholecalciferol, 25 MCG Oral Tab Take 2 tablets (2,000 Units total) by mouth daily. 30 tablet 0    Multiple Vitamins-Minerals (MERLIN MULTIVITAMIN FOR MEN) Oral Tab Take 1 tablet by mouth daily.      zinc sulfate 220 (50 Zn) MG Oral Cap Take 50 mg by mouth daily.      Sennosides-Docusate Sodium (STOOL SOFTENER/LAXATIVE OR) Take by mouth.      aspirin 81 MG Oral Chew Tab Chew by mouth nightly.        Glucosamine-Chondroit-Vit C-Mn (GLUCOSAMINE 1500 COMPLEX) Oral Cap Take 1 tablet by mouth daily. 1200 mg daily          Allergies:  Allergies[1]    ROS:   Systems were reviewed and were negative except as noted in the HPI    PHYSICAL EXAM:   Blood pressure 120/84, height 5' 7\" (1.702 m), weight 200 lb (90.7 kg).    General:awake, cooperative, no acute distress  HEENT: EOMI, no scleral icterus, MMM; oral pharnyx is without exudates or  lesions  Neck: no lymphadenopathy; thyroid is not enlarged and without nodules  CV: RRR  Resp: non-labored breathing  Abd: soft, non-tender, non-distended  Ext: no lower extremity swelling  Neuro: Alert, Oriented X 3  Skin: no rashes, bruises  Psych: normal affect    Labs/Imaging:     Reviewed as noted in the HPI and A/P    ASSESSMENT/PLAN:   Freddie Bautista is a 79 year old man with a history of BMI of 30, asthma with COPD, atrial fibrillation on anticoagulation, coronary artery disease status post CABG, aortic valve replacement, s/p Biv ICD, systolic heart failure, hypertension, hypercholesterolemia, trigeminal neuralgia, prior tobacco use, hernia surgery, tonsillectomy, skin cancer s/p resection, sleep apnea here regarding evaluation prior to colonoscopy    He was seen in September 2019 regarding colorectal cancer screening with a colonoscopy 10 years prior to that.  He underwent colonoscopy in November 2019 findings of a small colon adenoma removed as well as sigmoid colon diverticulosis and hemorrhoids.  Recommendations were for consideration of colonoscopy in 5 years.    I did review the recent cardiology note from October 2024.  Review of the chart also notes surgery at [place] in August 2024 for correction of a complicated paraesophageal hernia.    We had a lengthy discussion regarding whether or not to plan for a routine/surveillance colonoscopy at this time for colorectal cancer screening and his history of adenomatous colon polyps given his age and comorbidities.  We discussed the benefits and the risks and after lengthy discussion he prefers to proceed with colonoscopy at this time.  He notes having a follow-up with his cardiologist in some months and we will plan for colonoscopy after that.    Recommend:  -colonoscopy with MAC with split suprep at the hospital - see if warfarin can be held 4 days prior    Colonoscopy consent: I have discussed the risks, benefits, and alternatives (including stool testing)  to colonoscopy with the patient [who demonstrated understanding], including but not limited to the risks of bleeding, infection, pain, as well as the risks of anesthesia and perforation all leading to prolonged hospitalization, surgical intervention, or could be life threatening. I also specifically mentioned the risk of missed lesions/polyps. All questions were answered to the patient’s satisfaction. The patient signed informed consent and elected to proceed with colonoscopy with intervention [i.e. polypectomy, biopsy, control of bleeding, etc.] as indicated. I also discussed a ride home from a family member of friend is required and driving after the procedure with sedation is not safe or recommended.=    Orders This Visit:  No orders of the defined types were placed in this encounter.    Meds This Visit:  Requested Prescriptions      No prescriptions requested or ordered in this encounter     Imaging & Referrals:  None     River Zuniga MD  [facility] - Gastroenterology  1/2/2025             [1]   Allergies  Allergen Reactions    Cat Hair Extract      Other reaction(s): CAT HAIR STD EXTRACT    Sulfa Antibiotics      Other reaction(s): SULFA (SULFONAMIDE ANTIBIOTICS)

## 2025-01-02 ENCOUNTER — TELEPHONE (OUTPATIENT)
Dept: GASTROENTEROLOGY | Facility: CLINIC | Age: 80
End: 2025-01-02

## 2025-01-02 ENCOUNTER — OFFICE VISIT (OUTPATIENT)
Dept: GASTROENTEROLOGY | Facility: CLINIC | Age: 80
End: 2025-01-02

## 2025-01-02 VITALS
HEIGHT: 67 IN | BODY MASS INDEX: 31.39 KG/M2 | WEIGHT: 200 LBS | DIASTOLIC BLOOD PRESSURE: 84 MMHG | SYSTOLIC BLOOD PRESSURE: 120 MMHG

## 2025-01-02 DIAGNOSIS — Z12.11 SCREEN FOR COLON CANCER: Primary | ICD-10-CM

## 2025-01-02 DIAGNOSIS — Z86.0101 HISTORY OF ADENOMATOUS POLYP OF COLON: ICD-10-CM

## 2025-01-02 DIAGNOSIS — Z12.11 SCREENING FOR COLORECTAL CANCER: Primary | ICD-10-CM

## 2025-01-02 DIAGNOSIS — Z12.12 SCREENING FOR COLORECTAL CANCER: Primary | ICD-10-CM

## 2025-01-02 DIAGNOSIS — Z86.0100 HISTORY OF COLONIC POLYPS: ICD-10-CM

## 2025-01-02 PROCEDURE — 3079F DIAST BP 80-89 MM HG: CPT | Performed by: INTERNAL MEDICINE

## 2025-01-02 PROCEDURE — 3008F BODY MASS INDEX DOCD: CPT | Performed by: INTERNAL MEDICINE

## 2025-01-02 PROCEDURE — 99203 OFFICE O/P NEW LOW 30 MIN: CPT | Performed by: INTERNAL MEDICINE

## 2025-01-02 PROCEDURE — 3074F SYST BP LT 130 MM HG: CPT | Performed by: INTERNAL MEDICINE

## 2025-01-02 PROCEDURE — 1159F MED LIST DOCD IN RCRD: CPT | Performed by: INTERNAL MEDICINE

## 2025-01-02 PROCEDURE — 1160F RVW MEDS BY RX/DR IN RCRD: CPT | Performed by: INTERNAL MEDICINE

## 2025-01-02 RX ORDER — SODIUM, POTASSIUM,MAG SULFATES 17.5-3.13G
SOLUTION, RECONSTITUTED, ORAL ORAL
Qty: 1 EACH | Refills: 0 | Status: SHIPPED | OUTPATIENT
Start: 2025-01-02

## 2025-01-02 NOTE — TELEPHONE ENCOUNTER
Scheduled for:  Colonoscopy [address]  Provider Name:   Dr. Zuniga  Date:  7/7/2025  Location:  [facility]  Sedation:  MAC  Time:  7:30 am - Patient is aware he/she will receive a phone call the day before with the arrival time.  Prep:  Suprep  Meds/Allergies Reconciled?:  Physician reviewed  Diagnosis with codes:  Screening for colon cancer Z12.11; Hx of colon polyps Z86.010  Was patient informed to call insurance with codes (Y/N):  Yes  Referral sent?:  Referral was sent at the time of electronic surgical scheduling.  [facility] or [facility] notified?:  I sent an electronic request to Endo Scheduling and received a confirmation today.    Medication Orders:  Message sent about Warfarin orders.  Misc Orders:  N/A     Further instructions given by staff:  Prep instructions were given to pt in the office, pt verbalized understanding.

## 2025-01-02 NOTE — TELEPHONE ENCOUNTER
GI staff:    Please see if the patient can hold his warfarin 4 days prior to colonoscopy.  The procedure is not scheduled for several months and to be scheduled after his visit with his new cardiologist which she identifies as Dr. Moran at [facility]    Thanks    MD Jeramie Jaquez-[facility] - Gastroenterology  1/2/2025  10:29 AM

## 2025-01-02 NOTE — PATIENT INSTRUCTIONS
1. Schedule colonoscopy with MAC at the hospital after seeing your cardiologist    2.  bowel prep from pharmacy (split suprep) - please read attached/given instructions very carefully     3. Medication     **As we discussed we will try to see if it is okay to hold your warfarin to 4 days prior to the procedure**  Continue all medications for procedure    4. Read all bowel prep instructions carefully    5. AVOID seeds, nuts, popcorn, raw fruits and vegetables (cooked is okay) for 2-3 days before procedure    >>>Please note: if you were prescribed a bowel prep and it is too expensive or not covered by insurance, it is okay to substitute Trilyte or Golytely (or any similar generic prep). This can be done by notifying the pharmacy or calling our office.

## 2025-01-09 ENCOUNTER — MED REC SCAN ONLY (OUTPATIENT)
Dept: INTERNAL MEDICINE CLINIC | Facility: CLINIC | Age: 80
End: 2025-01-09

## 2025-01-30 ENCOUNTER — LAB ENCOUNTER (OUTPATIENT)
Dept: LAB | Age: 80
End: 2025-01-30
Attending: INTERNAL MEDICINE
Payer: MEDICARE

## 2025-01-30 DIAGNOSIS — I48.20 CHRONIC ATRIAL FIBRILLATION (HCC): ICD-10-CM

## 2025-01-30 LAB
INR BLD: 2.42 (ref 0.8–1.2)
PROTHROMBIN TIME: 27.5 SECONDS (ref 11.6–14.8)

## 2025-01-30 PROCEDURE — 36415 COLL VENOUS BLD VENIPUNCTURE: CPT

## 2025-01-30 PROCEDURE — 85610 PROTHROMBIN TIME: CPT

## 2025-02-10 ENCOUNTER — LAB ENCOUNTER (OUTPATIENT)
Dept: LAB | Facility: HOSPITAL | Age: 80
End: 2025-02-10
Attending: UROLOGY
Payer: MEDICARE

## 2025-02-10 ENCOUNTER — OFFICE VISIT (OUTPATIENT)
Dept: SURGERY | Facility: CLINIC | Age: 80
End: 2025-02-10
Payer: COMMERCIAL

## 2025-02-10 DIAGNOSIS — N40.1 BENIGN PROSTATIC HYPERPLASIA WITH URINARY FREQUENCY: ICD-10-CM

## 2025-02-10 DIAGNOSIS — R97.20 ELEVATED PSA: Primary | ICD-10-CM

## 2025-02-10 DIAGNOSIS — R35.0 BENIGN PROSTATIC HYPERPLASIA WITH URINARY FREQUENCY: ICD-10-CM

## 2025-02-10 LAB — PSA SERPL-MCNC: 5.41 NG/ML (ref ?–4)

## 2025-02-10 PROCEDURE — 1159F MED LIST DOCD IN RCRD: CPT | Performed by: UROLOGY

## 2025-02-10 PROCEDURE — 99214 OFFICE O/P EST MOD 30 MIN: CPT | Performed by: UROLOGY

## 2025-02-10 PROCEDURE — 36415 COLL VENOUS BLD VENIPUNCTURE: CPT | Performed by: UROLOGY

## 2025-02-10 PROCEDURE — 1126F AMNT PAIN NOTED NONE PRSNT: CPT | Performed by: UROLOGY

## 2025-02-10 PROCEDURE — 84153 ASSAY OF PSA TOTAL: CPT | Performed by: UROLOGY

## 2025-02-10 NOTE — PROGRESS NOTES
Freddie Bautista is a 79 year old male.    HPI:     Chief Complaint   Patient presents with    elevated psa     79-year-old male with a history of elevated but stable serum PSA most recently seen in the office 2024, known family history of prostate cancer although his father  at 92 of the disease and follow-up to a visit.  Feels well.  No bone pain or weight loss.  PSA has not been done since his last visit 2024.  Denies any bone pain or weight loss.  Denies any bothersome lower urinary tract symptoms.    Has a number of comorbid conditions including atrial fibrillation, coronary artery disease status post 2 coronary stents, hypertension, pulmonary hypertension.  Recently had a hiatal hernia repair at [facility] since his last visit.        HISTORY:  Past Medical History:    Allergic rhinitis    Asthma with COPD (chronic obstructive pulmonary disease) (HCC)    Atherosclerosis of coronary artery    Atrial fibrillation (HCC)    Atrial flutter (HCC)    BCC (basal cell carcinoma of skin)    left side neck    BCC (basal cell carcinoma)    Left chest    Benign prostatic hypertrophy    Closed fracture of left distal radius    Colon adenoma    Coronary artery disease    Per N coronary stents    Disorder of thyroid    Essential hypertension    Extrinsic asthma, unspecified    H/O hernia repair    Hernia, inguinal, bilateral    Herniated disc    Herniated disc    High blood pressure    High cholesterol    Hyperlipidemia    Inguinal hernia recurrent unilateral    Pulmonary hypertension (HCC)    Pulmonary hypertension (HCC)    S/P ablation of atrial flutter    Sleep apnea    Thyroid nodule    Per NG: surgery x2     Thyroid nodule    TN (trigeminal neuralgia)    Per NG: Anti-seizure medication    TN (trigeminal neuralgia)      Past Surgical History:   Procedure Laterality Date    Angioplasty (coronary)      Cabg      CABG and aortic valve replacement     Cardioversion      Cath  ablation  2019    for a-flutter in [place]    Colonoscopy      Colonoscopy  2019    Colonoscopy N/A 2019    Procedure: COLONOSCOPY;  Surgeon: River Zuniga MD;  Location: [facility] ENDOSCOPY    Ep cardioversion 1x  8/4/2021         Ep cardioversion 1x  10/18/2021         Ep pulmonary vein/a-fib ablation  10/6/2021         Exc skin benig 2.1-3cm remaindr body  08/10/2022    Hernia surgery      Other surgical history      trigeminal neuralgia surgery    Other surgical history      partial thyroidectomy for thyroid nodule    Replace aortic valve w/byp      Repr cmpl wnd head,fac,hand 2.6-7.5  08/10/2022    Tonsillectomy        Family History   Problem Relation Age of Onset    Prostate Cancer Father     Cancer Father         prostate cancer    Stroke Mother         Per NG: TIA's    Other (Other) Mother         vascular dementia    Skin cancer Maternal Grandmother     Cancer Maternal Grandmother     Kidney Disease Maternal Grandfather         Per NG kidney removal    Skin cancer Maternal Grandfather     Prostate Cancer Paternal Grandfather     Cancer Sister         unknown cancer    Other (Other) Sister         eczema    Heart Disease Other         PEr NG: Family history of CAD      Social History:   Social History     Socioeconomic History    Marital status:    Tobacco Use    Smoking status: Former     Current packs/day: 0.00     Average packs/day: 0.3 packs/day for 2.0 years (0.5 ttl pk-yrs)     Types: Cigarettes     Start date: 10/5/1967     Quit date: 10/5/1969     Years since quittin.3     Passive exposure: Past    Smokeless tobacco: Never   Vaping Use    Vaping status: Never Used   Substance and Sexual Activity    Alcohol use: Yes     Alcohol/week: 0.8 standard drinks of alcohol     Types: 1 Cans of beer per week     Comment: very rare    Drug use: Never   Other Topics Concern    Pt has a pacemaker Yes    Pt has a defibrillator Yes    Reaction to local anesthetic No     Social Drivers of  Health     Food Insecurity: No Food Insecurity (11/30/2024)    Received from [facility]    Hunger Vital Sign     Worried About Running Out of Food in the Last Year: Never true     Ran Out of Food in the Last Year: Never true   Transportation Needs: No Transportation Needs (11/30/2024)    Received from [facility]    PRAPARE - Transportation     Lack of Transportation (Medical): No     Lack of Transportation (Non-Medical): No   Housing Stability: Unknown (11/30/2024)    Received from [facility]    Housing Stability Vital Sign     Unable to Pay for Housing in the Last Year: No        Medications (Active prior to today's visit):  Current Outpatient Medications   Medication Sig Dispense Refill    Na Sulfate-K Sulfate-Mg Sulf (SUPREP BOWEL PREP KIT) 17.5-3.13-1.6 GM/177ML Oral Solution Take as directed 1 each 0    azelastine 137 MCG/SPRAY Nasal Solution 1-2 sprays by Nasal route in the morning and 1-2 sprays before bedtime. FOR SINUS SYMPTOMS/NASAL CONGESTION.. (Patient not taking: Reported on 1/2/2025) 30 mL 11    fluticasone propionate 50 MCG/ACT Nasal Suspension 2 sprays by Each Nare route daily. FOR NASAL CONGESTION/SINUS SYMPTOMS. (Patient not taking: Reported on 1/2/2025) 1 each 11    methylPREDNISolone 4 MG Oral Tablet Therapy Pack Take as directed with food. (Patient not taking: Reported on 1/2/2025) 1 each 0    warfarin 2.5 MG Oral Tab Take 3 tablets (7.5 mg total) by mouth nightly. Sat, Sunday-thurs (total 7.5mg)  Friday takes total 10mg)      benzonatate 200 MG Oral Cap Take 1 capsule (200 mg total) by mouth 3 (three) times daily as needed for cough. 40 capsule 1    levothyroxine 100 MCG Oral Tab Take 1 tablet (100 mcg total) by mouth daily. 90 tablet 3    carBAMazepine  MG Oral Tablet 12 Hr Take 1 tablet (400 mg total) by mouth 3 (three) times daily. 270 tablet 1    gabapentin 100 MG Oral Cap Start with 1 pill TID for 1 week, then 2 pills  TID for another week, then 3 pills TID (Patient not taking: Reported on 4/18/2024) 270 capsule 5    XARELTO 20 MG Oral Tab Take 1 tablet (20 mg total) by mouth daily. Resume on 5/14 at night (Patient not taking: Reported on 8/8/2024) 90 tablet 1    carvedilol 3.125 MG Oral Tab Take 1 tablet (3.125 mg total) by mouth 2 (two) times daily with meals. 60 tablet 5    sacubitril-valsartan 24-26 MG Oral Tab Take 1 tablet by mouth 2 (two) times daily.      Magnesium Oxide 400 (240 Mg) MG Oral Tab Take 1 tablet (400 mg total) by mouth daily.      atorvastatin 80 MG Oral Tab Take 1 tablet (80 mg total) by mouth nightly.      ascorbic acid 1000 MG Oral Tab Take 1 tablet (1,000 mg total) by mouth daily. 30 tablet 0    Vitamin D3, Cholecalciferol, 25 MCG Oral Tab Take 2 tablets (2,000 Units total) by mouth daily. 30 tablet 0    Multiple Vitamins-Minerals (MERLIN MULTIVITAMIN FOR MEN) Oral Tab Take 1 tablet by mouth daily.      zinc sulfate 220 (50 Zn) MG Oral Cap Take 50 mg by mouth daily.      Sennosides-Docusate Sodium (STOOL SOFTENER/LAXATIVE OR) Take by mouth.      aspirin 81 MG Oral Chew Tab Chew by mouth nightly.        Glucosamine-Chondroit-Vit C-Mn (GLUCOSAMINE 1500 COMPLEX) Oral Cap Take 1 tablet by mouth daily. 1200 mg daily          Allergies:  Allergies[1]      ROS:       PHYSICAL EXAM:   Digital prostate exam demonstrates a normal sphincter tone, slight prominence to the prostate on the left side but without nodules or abnormal firmness     ASSESSMENT/PLAN:   Assessment   Encounter Diagnoses   Name Primary?    Elevated PSA Yes    Benign prostatic hyperplasia with urinary frequency          Recommend:  - Repeat PSA today.  - Tentatively follow-up in 1 year pending those results.  I spent a total of 25 minutes with patient more than half the time in face-to-face discussion.         Orders This Visit:  No orders of the defined types were placed in this encounter.      Meds This Visit:  Requested Prescriptions      No  prescriptions requested or ordered in this encounter       Imaging & Referrals:  None     2/10/2025  Lebron Boo MD               [1]   Allergies  Allergen Reactions    Cat Hair Extract      Other reaction(s): CAT HAIR STD EXTRACT    Sulfa Antibiotics      Other reaction(s): SULFA (SULFONAMIDE ANTIBIOTICS)

## 2025-03-04 DIAGNOSIS — E03.9 ACQUIRED HYPOTHYROIDISM: ICD-10-CM

## 2025-03-07 ENCOUNTER — OFFICE VISIT (OUTPATIENT)
Dept: INTERNAL MEDICINE CLINIC | Facility: CLINIC | Age: 80
End: 2025-03-07
Payer: COMMERCIAL

## 2025-03-07 VITALS
DIASTOLIC BLOOD PRESSURE: 80 MMHG | HEIGHT: 67 IN | HEART RATE: 71 BPM | BODY MASS INDEX: 31.24 KG/M2 | WEIGHT: 199.06 LBS | SYSTOLIC BLOOD PRESSURE: 118 MMHG

## 2025-03-07 DIAGNOSIS — I38 VALVULAR HEART DISEASE: ICD-10-CM

## 2025-03-07 DIAGNOSIS — I10 ESSENTIAL HYPERTENSION, BENIGN: ICD-10-CM

## 2025-03-07 DIAGNOSIS — N40.1 BENIGN PROSTATIC HYPERPLASIA WITH LOWER URINARY TRACT SYMPTOMS, SYMPTOM DETAILS UNSPECIFIED: ICD-10-CM

## 2025-03-07 DIAGNOSIS — I50.22 CHRONIC SYSTOLIC HEART FAILURE (HCC): ICD-10-CM

## 2025-03-07 DIAGNOSIS — Z00.00 MEDICARE ANNUAL WELLNESS VISIT, SUBSEQUENT: Primary | ICD-10-CM

## 2025-03-07 DIAGNOSIS — I25.10 CORONARY ARTERY DISEASE INVOLVING NATIVE CORONARY ARTERY OF NATIVE HEART WITHOUT ANGINA PECTORIS: ICD-10-CM

## 2025-03-07 DIAGNOSIS — J30.9 ALLERGIC RHINITIS, UNSPECIFIED SEASONALITY, UNSPECIFIED TRIGGER: ICD-10-CM

## 2025-03-07 DIAGNOSIS — I48.20 CHRONIC ATRIAL FIBRILLATION (HCC): ICD-10-CM

## 2025-03-07 DIAGNOSIS — Z86.39 HISTORY OF THYROID NODULE: ICD-10-CM

## 2025-03-07 DIAGNOSIS — G50.0 TRIGEMINAL NEURALGIA: ICD-10-CM

## 2025-03-07 DIAGNOSIS — E78.00 PURE HYPERCHOLESTEROLEMIA: ICD-10-CM

## 2025-03-07 DIAGNOSIS — E03.9 ACQUIRED HYPOTHYROIDISM: ICD-10-CM

## 2025-03-07 RX ORDER — LEVOTHYROXINE SODIUM 100 UG/1
100 TABLET ORAL DAILY
Qty: 90 TABLET | Refills: 3 | Status: SHIPPED | OUTPATIENT
Start: 2025-03-07

## 2025-03-07 NOTE — PROGRESS NOTES
Subjective:   Freddie Bautista is a 79 year old male who presents for a MA AHA (Medicare Advantage Annual Health Assessment) and Subsequent Annual Wellness visit (Pt already had Initial Annual Wellness) and scheduled follow up of multiple significant but stable problems.       History/Other:   Fall Risk Assessment:   He has been screened for Falls and is low risk.      Cognitive Assessment:   He had a completely normal cognitive assessment - see flowsheet entries     Functional Ability/Status:   Freddie Bautista has a completely normal functional assessment. See flowsheet for details.      Depression Screening (PHQ):  PHQ-2 SCORE: 0  , done 3/7/2025            Advanced Directives:   He does NOT have a Living Will. [Do you have a living will?: No]  He has a Power of  for Health Care on file in [employer].  Patient has Advance Care Planning documents present in EMR. Reviewed documents with patient (and family/surrogate if present).      Patient Active Problem List   Diagnosis    BPH (benign prostatic hyperplasia)    CAD (coronary artery disease), native coronary artery    Valvular heart disease    Allergic rhinitis due to allergen    Essential hypertension, benign    Pure hypercholesterolemia    Long term current use of anticoagulant    Chronic atrial fibrillation (HCC)    History of thyroid nodule    Medicare annual wellness visit, subsequent    Trigeminal neuralgia    AV block, 3rd degree (HCC)    Chronic systolic heart failure (HCC)    Pneumoperitoneum    Acquired hypothyroidism     Allergies:  He is allergic to cat hair extract and sulfa antibiotics.    Current Medications:  Outpatient Medications Marked as Taking for the 3/7/25 encounter (Office Visit) with Tomas Beaulieu MD   Medication Sig    warfarin 2.5 MG Oral Tab Take 3 tablets (7.5 mg total) by mouth nightly. Sat, Sunday-thurs (total 7.5mg)  Friday takes total 10mg)    [DISCONTINUED] levothyroxine 100 MCG Oral Tab Take 1 tablet (100 mcg total) by mouth  daily.    carBAMazepine  MG Oral Tablet 12 Hr Take 1 tablet (400 mg total) by mouth 3 (three) times daily.    carvedilol 3.125 MG Oral Tab Take 1 tablet (3.125 mg total) by mouth 2 (two) times daily with meals.    sacubitril-valsartan 24-26 MG Oral Tab Take 1 tablet by mouth 2 (two) times daily.    Magnesium Oxide 400 (240 Mg) MG Oral Tab Take 1 tablet (400 mg total) by mouth daily.    atorvastatin 80 MG Oral Tab Take 1 tablet (80 mg total) by mouth nightly.    ascorbic acid 1000 MG Oral Tab Take 1 tablet (1,000 mg total) by mouth daily.    Vitamin D3, Cholecalciferol, 25 MCG Oral Tab Take 2 tablets (2,000 Units total) by mouth daily.    Multiple Vitamins-Minerals (MERLIN MULTIVITAMIN FOR MEN) Oral Tab Take 1 tablet by mouth daily.    zinc sulfate 220 (50 Zn) MG Oral Cap Take 50 mg by mouth daily.    Sennosides-Docusate Sodium (STOOL SOFTENER/LAXATIVE OR) Take by mouth.    aspirin 81 MG Oral Chew Tab Chew by mouth nightly.      Glucosamine-Chondroit-Vit C-Mn (GLUCOSAMINE 1500 COMPLEX) Oral Cap Take 1 tablet by mouth daily. 1200 mg daily        Medical History:  He  has a past medical history of Acquired hypothyroidism (3/9/2025), Allergic rhinitis, Asthma with COPD (chronic obstructive pulmonary disease) ([facility]) (05/21/2024), Atherosclerosis of coronary artery, Atrial fibrillation ([facility]), Atrial flutter ([facility]), BCC (basal cell carcinoma of skin) (2022), BCC (basal cell carcinoma) (10/2022), Benign prostatic hypertrophy, Closed fracture of left distal radius (06/25/2019), Colon adenoma (11/18/2019), Coronary artery disease (2000), Disorder of thyroid, Essential hypertension, Extrinsic asthma, unspecified, H/O hernia repair, Hernia, inguinal, bilateral, Herniated disc, Herniated disc, High blood pressure, High cholesterol, Hyperlipidemia, Inguinal hernia recurrent unilateral (07/04/2013), Pulmonary hypertension ([facility]), Pulmonary hypertension ([facility]), S/P ablation of atrial flutter (06/12/2019), Sleep apnea, Thyroid  nodule, Thyroid nodule, TN (trigeminal neuralgia) (), and TN (trigeminal neuralgia).  Surgical History:  He  has a past surgical history that includes tonsillectomy; hernia surgery; replace aortic valve w/byp; cardioversion; cath ablation (2019); angioplasty (coronary); cabg; colonoscopy; colonoscopy (2019); colonoscopy (N/A, 2019); ep cardioversion 1x (2021); ep pulmonary vein/a-fib ablation (10/06/2021); ep cardioversion 1x (10/18/2021); exc skin benig 2.1-3cm remaindr body (08/10/2022); repr cmpl wnd head,fac,hand 2.6-7.5 (08/10/2022); other surgical history; and other surgical history.   Family History:  His family history includes Cancer in his father, maternal grandmother, and sister; Heart Disease in an other family member; Kidney Disease in his maternal grandfather; Other in his mother and sister; Prostate Cancer in his father and paternal grandfather; Skin cancer in his maternal grandfather and maternal grandmother; Stroke in his mother.  Social History:  He  reports that he quit smoking about 55 years ago. His smoking use included cigarettes. He started smoking about 57 years ago. He has a 0.5 pack-year smoking history. He has been exposed to tobacco smoke. He has never used smokeless tobacco. He reports current alcohol use of about 0.8 standard drinks of alcohol per week. He reports that he does not use drugs.    Tobacco:  He smoked tobacco in the past but quit greater than 12 months ago.  Social History     Tobacco Use   Smoking Status Former    Current packs/day: 0.00    Average packs/day: 0.3 packs/day for 2.0 years (0.5 ttl pk-yrs)    Types: Cigarettes    Start date: 10/5/1967    Quit date: 10/5/1969    Years since quittin.4    Passive exposure: Past   Smokeless Tobacco Never        CAGE Alcohol Screen:   CAGE screening score of 0 on 3/7/2025, showing low risk of alcohol abuse.      Patient Care Team:  Tomas Beaulieu MD as PCP - General (Internal  Medicine)  River Aguiar MD as Consulting Physician (SURGERY, CARDIOVASCULAR)  River Watt MD as Consulting Physician (Cardiovascular Diseases)  Fritz Hansen MD as Consulting Physician (PULMONARY DISEASES)  Zohra Weiss APRN as Nurse Practitioner (Nurse Practitioner)    Review of Systems  GENERAL: feels well otherwise  SKIN: denies any unusual skin lesions  EYES: denies blurred vision or double vision  HEENT: denies nasal congestion, sinus pain or ST  LUNGS: denies shortness of breath with exertion  CARDIOVASCULAR: denies chest pain on exertion  GI: denies abdominal pain, denies heartburn; no hematemesis/hematochezia; no dysphagia  : 1 to 2  per night nocturia, no complaint of urinary incontinence; no hematuria  MUSCULOSKELETAL: denies back pain  NEURO: denies headaches  PSYCHE: denies depression or anxiety  HEMATOLOGIC: denies hx of anemia  ENDOCRINE: thyroid history  ALL/ASTHMA: denies hx of allergy or asthma    Objective:   Physical Exam  General Appearance:  Alert, cooperative, no distress, appears stated age   Head:  Normocephalic, without obvious abnormality, atraumatic   Eyes:  PERRL, conjunctiva/corneas clear, EOM's intact, both eyes   Ears:  Normal TM's and external ear canals, both ears   Nose: Nares normal, septum midline, mucosa normal, no drainage or sinus tenderness   Throat: Lips, mucosa, and tongue normal; teeth and gums normal   Neck: Supple, symmetrical, trachea midline, no adenopathy, thyroid: not enlarged, symmetric, no tenderness/mass/nodules, no carotid bruit or JVD   Back:   Symmetric, no curvature, ROM normal, no CVA tenderness   Lungs:   Clear to auscultation bilaterally, respirations unlabored   Chest Wall:  No tenderness or deformity   Heart:  Regular rate and rhythm, S1, S2 normal, no murmur, rub or gallop   Abdomen:   Soft, non-tender, bowel sounds active all four quadrants,  no masses, no organomegaly   Genitalia: Deferred to urologist   Rectal: Deferred to  urologist   Extremities: Extremities normal, atraumatic, no cyanosis or edema   Pulses: 2+ and symmetric   Skin: Skin color, texture, turgor normal, no rashes or lesions   Lymph nodes: Cervical, supraclavicular,  nodes normal   Neurologic: Normal     /80   Pulse 71   Ht 5' 7\" (1.702 m)   Wt 199 lb 1 oz (90.3 kg)   BMI 31.18 kg/m²  Estimated body mass index is 31.18 kg/m² as calculated from the following:    Height as of this encounter: 5' 7\" (1.702 m).    Weight as of this encounter: 199 lb 1 oz (90.3 kg).    Medicare Hearing Assessment:   Hearing Screening    Screening Method: Finger Rub  Finger Rub Result: Pass         Visual Acuity:   Right Eye Visual Acuity: Corrected Right Eye Chart Acuity: 20/13   Left Eye Visual Acuity: Corrected Left Eye Chart Acuity: 20/13   Both Eyes Visual Acuity: Corrected Both Eyes Chart Acuity: 20/10   Able To Tolerate Visual Acuity: Yes        Assessment & Plan:   Freddie Bautista is a 79 year old male who presents for a Medicare Assessment.     (Z00.00) Medicare annual wellness visit, subsequent  (primary encounter diagnosis)  Plan: pt already had his flu shot; declined covid booster.    (I25.10) Coronary artery disease involving native coronary artery of native heart without angina pectoris  Plan: hx of CABG before, remains asymptomatic and continues to be physical active; he is on statn.     (I48.20) Chronic atrial fibrillation (HCC)  Plan: had ablation tx in the past. Had been on eliquis for stroke prevention but recently switched to warfarin per cardio.     (I50.22) Chronic systolic heart failure (HCC)  Plan: pt compensated and euvolemic,; on entresto,beta blocker; continues to ffup with cardio.     (I10) Essential hypertension, benign  Plan: bp controlled. Cpm.    (E78.00) Pure hypercholesterolemia  Plan: controlled with statin.     (G50.0) Trigeminal neuralgia  Plan: Carbamazepine (Tegretol) Total        Pt had been ff by neuro; on tegretol. Check level.     (E03.9)  Acquired hypothyroidism  Plan: TSH W Reflex To Free T4        Check tsh, on LT4.    (Z86.39) History of thyroid nodule  Plan: remote hx of bengn thyroid nodule ; had partial  thyroidectomy in the past.     (I38) Valvular heart disease  Plan: had hx of AVR before, had been ff by cardio.     (N40.1) Benign prostatic hyperplasia with lower urinary tract symptoms, symptom details unspecified  Plan: pt had been ff by urologist, psa had been also checked by urologist.     (J30.9) Allergic rhinitis, unspecified seasonality, unspecified trigger  Plan: continue allergy meds.     The patient indicates understanding of these issues and agrees to the plan.  Reinforced healthy diet, lifestyle, and exercise.      No follow-ups on file.     Tomas Beaulieu MD, 3/7/2025     Supplementary Documentation:   General Health:  In the past six months, have you lost more than 10 pounds without trying?: 2 - No  Has your appetite been poor?: No  Type of Diet: Low Salt  How does the patient maintain a good energy level?: Appropriate Exercise  How would you describe your daily physical activity?: Moderate  How would you describe your current health state?: Good  How do you maintain positive mental well-being?: Social Interaction, Puzzles, Visiting Family  On a scale of 0 to 10, with 0 being no pain and 10 being severe pain, what is your pain level?: 1 - (Mild)  In the past six months, have you experienced urine leakage?: 0-No  At any time do you feel concerned for the safety/well-being of yourself and/or your children, in your home or elsewhere?: No  Have you had any immunizations at another office such as Influenza, Hepatitis B, Tetanus, or Pneumococcal?: No    Health Maintenance   Topic Date Due    COVID-19 Vaccine (5 - 2024-25 season) 09/01/2024    Influenza Vaccine (1) 10/01/2024    Colorectal Cancer Screening  11/18/2024    Annual Well Visit  01/01/2025    Fall Risk Screening (Annual)  01/01/2025    HTN: BP Follow-Up  04/07/2025     PSA  02/10/2026    Annual Depression Screening  Completed    Pneumococcal Vaccine: 50+ Years  Completed    Zoster Vaccines  Completed    Meningococcal B Vaccine  Aged Out

## 2025-03-07 NOTE — TELEPHONE ENCOUNTER
Please kindly review this medication    [x] FAILS PROTOCOL    [] HAS NO PROTOCOL ATTACHED    No active/future labs pended for TSH: Last completed 10/27/2023    Component  Ref Range & Units 10/27/23 11:46 AM   TSH  0.358 - 3.740 mIU/mL 3.240

## 2025-03-09 PROBLEM — I50.22 CHRONIC SYSTOLIC HEART FAILURE (HCC): Status: ACTIVE | Noted: 2021-09-16

## 2025-03-09 PROBLEM — J44.89 ASTHMA WITH COPD (CHRONIC OBSTRUCTIVE PULMONARY DISEASE) (HCC): Chronic | Status: RESOLVED | Noted: 2024-05-21 | Resolved: 2025-03-09

## 2025-03-09 PROBLEM — E03.9 ACQUIRED HYPOTHYROIDISM: Status: ACTIVE | Noted: 2025-03-09

## 2025-03-18 ENCOUNTER — LAB ENCOUNTER (OUTPATIENT)
Dept: LAB | Age: 80
End: 2025-03-18
Attending: INTERNAL MEDICINE
Payer: MEDICARE

## 2025-03-18 DIAGNOSIS — G50.0 TRIGEMINAL NEURALGIA: ICD-10-CM

## 2025-03-18 DIAGNOSIS — E03.9 ACQUIRED HYPOTHYROIDISM: ICD-10-CM

## 2025-03-18 DIAGNOSIS — I48.20 CHRONIC ATRIAL FIBRILLATION (HCC): ICD-10-CM

## 2025-03-18 LAB
CARBAMAZEPINE SERPL-MCNC: 8 UG/ML (ref 4–12)
INR BLD: 2.33 (ref 0.8–1.2)
PROTHROMBIN TIME: 26.7 SECONDS (ref 11.6–14.8)
TSI SER-ACNC: 4.05 UIU/ML (ref 0.55–4.78)

## 2025-03-18 PROCEDURE — 84443 ASSAY THYROID STIM HORMONE: CPT

## 2025-03-18 PROCEDURE — 85610 PROTHROMBIN TIME: CPT

## 2025-03-18 PROCEDURE — 36415 COLL VENOUS BLD VENIPUNCTURE: CPT

## 2025-03-18 PROCEDURE — 80156 ASSAY CARBAMAZEPINE TOTAL: CPT

## 2025-03-24 PROBLEM — M23.91 LOCKING OF RIGHT KNEE: Status: ACTIVE | Noted: 2025-03-24

## 2025-03-24 PROBLEM — M23.91 INTERNAL DERANGEMENT OF RIGHT KNEE: Status: ACTIVE | Noted: 2025-03-24

## 2025-04-14 ENCOUNTER — LAB ENCOUNTER (OUTPATIENT)
Dept: LAB | Age: 80
End: 2025-04-14
Attending: INTERNAL MEDICINE
Payer: MEDICARE

## 2025-04-14 DIAGNOSIS — I48.20 CHRONIC ATRIAL FIBRILLATION (HCC): ICD-10-CM

## 2025-04-14 LAB
INR BLD: 2.1 (ref 0.8–1.2)
PROTHROMBIN TIME: 24.7 SECONDS (ref 11.6–14.8)

## 2025-04-14 PROCEDURE — 85610 PROTHROMBIN TIME: CPT

## 2025-04-14 PROCEDURE — 36415 COLL VENOUS BLD VENIPUNCTURE: CPT

## 2025-04-28 ENCOUNTER — HOSPITAL ENCOUNTER (OUTPATIENT)
Dept: CT IMAGING | Facility: HOSPITAL | Age: 80
Discharge: HOME | End: 2025-04-28
Attending: ORTHOPAEDIC SURGERY
Payer: MEDICARE

## 2025-04-28 ENCOUNTER — HOSPITAL ENCOUNTER (OUTPATIENT)
Dept: GENERAL RADIOLOGY | Facility: HOSPITAL | Age: 80
Discharge: HOME | End: 2025-04-28
Attending: ORTHOPAEDIC SURGERY
Payer: MEDICARE

## 2025-04-28 ENCOUNTER — NURSE ONLY (OUTPATIENT)
Dept: LAB | Facility: HOSPITAL | Age: 80
End: 2025-04-28
Attending: ORTHOPAEDIC SURGERY
Payer: MEDICARE

## 2025-04-28 DIAGNOSIS — M23.91 LOCKING OF RIGHT KNEE: ICD-10-CM

## 2025-04-28 DIAGNOSIS — M23.91 INTERNAL DERANGEMENT OF RIGHT KNEE: ICD-10-CM

## 2025-04-28 LAB
INR BLD: 2.68 (ref 0.8–1.2)
PLATELET # BLD AUTO: 170 10(3)UL (ref 150–450)
PROTHROMBIN TIME: 28.8 SECONDS (ref 11.6–14.8)

## 2025-04-28 PROCEDURE — 73701 CT LOWER EXTREMITY W/DYE: CPT | Performed by: ORTHOPAEDIC SURGERY

## 2025-04-28 PROCEDURE — 77002 NEEDLE LOCALIZATION BY XRAY: CPT | Performed by: ORTHOPAEDIC SURGERY

## 2025-04-28 PROCEDURE — 85049 AUTOMATED PLATELET COUNT: CPT | Performed by: RADIOLOGY

## 2025-04-28 PROCEDURE — 27369 NJX CNTRST KNE ARTHG/CT/MRI: CPT | Performed by: ORTHOPAEDIC SURGERY

## 2025-04-28 PROCEDURE — 85610 PROTHROMBIN TIME: CPT | Performed by: RADIOLOGY

## 2025-04-28 RX ORDER — IOPAMIDOL 612 MG/ML
30 INJECTION, SOLUTION INTRATHECAL
Status: COMPLETED | OUTPATIENT
Start: 2025-04-28 | End: 2025-04-28

## 2025-05-01 PROBLEM — I50.9 CONGESTIVE HEART FAILURE (HCC): Status: ACTIVE | Noted: 2025-05-01

## 2025-05-01 PROBLEM — M17.11 PRIMARY OSTEOARTHRITIS OF RIGHT KNEE: Status: ACTIVE | Noted: 2025-05-01

## 2025-05-01 PROBLEM — S83.231A COMPLEX TEAR OF MEDIAL MENISCUS OF RIGHT KNEE AS CURRENT INJURY: Status: ACTIVE | Noted: 2025-05-01

## 2025-05-01 PROBLEM — E03.9 HYPOTHYROIDISM: Status: ACTIVE | Noted: 2025-05-01

## 2025-05-01 PROBLEM — T88.4XXA DIFFICULT AIRWAY: Status: ACTIVE | Noted: 2024-08-11

## 2025-05-12 ENCOUNTER — LAB ENCOUNTER (OUTPATIENT)
Dept: LAB | Age: 80
End: 2025-05-12
Attending: INTERNAL MEDICINE
Payer: MEDICARE

## 2025-05-12 DIAGNOSIS — I48.20 CHRONIC ATRIAL FIBRILLATION (HCC): ICD-10-CM

## 2025-05-12 LAB
INR BLD: 2.21 (ref 0.8–1.2)
PROTHROMBIN TIME: 25.6 SECONDS (ref 11.6–14.8)

## 2025-05-12 PROCEDURE — 85610 PROTHROMBIN TIME: CPT

## 2025-05-12 PROCEDURE — 36415 COLL VENOUS BLD VENIPUNCTURE: CPT

## 2025-06-26 ENCOUNTER — LAB ENCOUNTER (OUTPATIENT)
Dept: LAB | Facility: HOSPITAL | Age: 80
End: 2025-06-26
Attending: INTERNAL MEDICINE
Payer: MEDICARE

## 2025-06-26 DIAGNOSIS — I48.20 CHRONIC ATRIAL FIBRILLATION (HCC): ICD-10-CM

## 2025-06-26 LAB
INR BLD: 1.91 (ref 0.8–1.2)
PROTHROMBIN TIME: 22.9 SECONDS (ref 11.6–14.8)

## 2025-06-26 PROCEDURE — 85610 PROTHROMBIN TIME: CPT

## 2025-06-26 PROCEDURE — 36415 COLL VENOUS BLD VENIPUNCTURE: CPT

## 2025-07-03 NOTE — OR NURSING
Per [employer] [phone]    All [employer] devices are compatible with magnet use.   Pt has a Resonate CRT-D (cardiac resynchronization therapy defibrillator), model G447, implant date 5/12/23. Applying a magnet will place the device in a temporary \"Monitor Only\" mode. No shocks or anti-tachycardia pacing will be delivered as long as the magnet remains in place. Beeping tones will be emitted once per second. There is no change to pacing therapy. Once the magnet is removed, the device returns to normal.  Devices do not need to be interrogated after magnet use, unless there is concern for device malfunction (for example, a significant drop in rate).

## 2025-07-07 ENCOUNTER — HOSPITAL ENCOUNTER (OUTPATIENT)
Facility: HOSPITAL | Age: 80
Setting detail: HOSPITAL OUTPATIENT SURGERY
Discharge: HOME | End: 2025-07-07
Attending: INTERNAL MEDICINE | Admitting: INTERNAL MEDICINE
Payer: MEDICARE

## 2025-07-07 ENCOUNTER — ANESTHESIA (OUTPATIENT)
Dept: ENDOSCOPY | Facility: HOSPITAL | Age: 80
End: 2025-07-07
Payer: MEDICARE

## 2025-07-07 ENCOUNTER — ANESTHESIA EVENT (OUTPATIENT)
Dept: ENDOSCOPY | Facility: HOSPITAL | Age: 80
End: 2025-07-07
Payer: MEDICARE

## 2025-07-07 VITALS
DIASTOLIC BLOOD PRESSURE: 94 MMHG | OXYGEN SATURATION: 94 % | WEIGHT: 195 LBS | TEMPERATURE: 97 F | RESPIRATION RATE: 11 BRPM | HEIGHT: 67 IN | SYSTOLIC BLOOD PRESSURE: 113 MMHG | BODY MASS INDEX: 30.61 KG/M2 | HEART RATE: 71 BPM

## 2025-07-07 DIAGNOSIS — Z86.0100 HISTORY OF COLONIC POLYPS: ICD-10-CM

## 2025-07-07 DIAGNOSIS — Z12.11 SCREEN FOR COLON CANCER: ICD-10-CM

## 2025-07-07 PROBLEM — Z86.0101 HISTORY OF ADENOMATOUS AND SERRATED COLON POLYPS: Status: ACTIVE | Noted: 2025-07-07

## 2025-07-07 PROCEDURE — 45380 COLONOSCOPY AND BIOPSY: CPT | Performed by: INTERNAL MEDICINE

## 2025-07-07 PROCEDURE — 45385 COLONOSCOPY W/LESION REMOVAL: CPT | Performed by: INTERNAL MEDICINE

## 2025-07-07 RX ORDER — NALOXONE HYDROCHLORIDE 0.4 MG/ML
0.08 INJECTION, SOLUTION INTRAMUSCULAR; INTRAVENOUS; SUBCUTANEOUS ONCE AS NEEDED
Status: DISCONTINUED | OUTPATIENT
Start: 2025-07-07 | End: 2025-07-07

## 2025-07-07 RX ORDER — LIDOCAINE HYDROCHLORIDE 10 MG/ML
INJECTION, SOLUTION EPIDURAL; INFILTRATION; INTRACAUDAL; PERINEURAL AS NEEDED
Status: DISCONTINUED | OUTPATIENT
Start: 2025-07-07 | End: 2025-07-07 | Stop reason: SURG

## 2025-07-07 RX ORDER — SODIUM CHLORIDE, SODIUM LACTATE, POTASSIUM CHLORIDE, CALCIUM CHLORIDE 600; 310; 30; 20 MG/100ML; MG/100ML; MG/100ML; MG/100ML
INJECTION, SOLUTION INTRAVENOUS CONTINUOUS
Status: DISCONTINUED | OUTPATIENT
Start: 2025-07-07 | End: 2025-07-07

## 2025-07-07 RX ADMIN — LIDOCAINE HYDROCHLORIDE 50 MG: 10 INJECTION, SOLUTION EPIDURAL; INFILTRATION; INTRACAUDAL; PERINEURAL at 08:56:00

## 2025-07-07 RX ADMIN — SODIUM CHLORIDE, SODIUM LACTATE, POTASSIUM CHLORIDE, CALCIUM CHLORIDE: 600; 310; 30; 20 INJECTION, SOLUTION INTRAVENOUS at 08:56:00

## 2025-07-07 RX ADMIN — SODIUM CHLORIDE, SODIUM LACTATE, POTASSIUM CHLORIDE, CALCIUM CHLORIDE: 600; 310; 30; 20 INJECTION, SOLUTION INTRAVENOUS at 09:14:00

## 2025-07-07 NOTE — OPERATIVE REPORT
Colonoscopy Report    Freddie Bautista     1945 Age 79 year old   PCP Tomas Beaulieu MD Endoscopist River Zuniga MD     Date of procedure: 25    Procedure: Colonoscopy w/ biopsy and snare polypectomy    Pre-operative diagnosis: colorectal cancer screening, history of adenomatous colon polyp     Last colonoscopy in 2019    Post-operative diagnosis: see impression    Sedation: monitored anesthesia care (MAC)    Consent: We discussed the risks/benefits and alternatives to this procedure, as well as the planned sedation. Informed consent was obtained from the patient after the risks of the procedure were discussed, including but not limited to bleeding, perforation, aspiration, infection, or possibility of a missed lesion as well as the risks of anesthesia including but not limited to cardiopulmonary complications. The patient signed informed consent and elected to proceed with Colonoscopy with intervention [i.e. Biopsy, control of bleeding, dilatation, polypectomy, endoscopic mucosal resection, etc.] as indicated.    Colonoscopy procedure: Once an adequate level of sedation was obtained a digital rectal exam was completed revealing normal tone and no masses palpated. Then the lubricated tip of the [id number] diagnostic video colonoscope was carefully inserted and advanced without difficulty to the cecum using the air insufflation technique (only Co2 was used for insufflation). The cecum was identified by localizing the trifold, the appendix and the ileocecal valve. Withdrawal was begun with thorough washing and careful examination of the colonic walls and folds. The ascending colon was viewed twice in the forward view. Photodocumentation was obtained. The bowel prep was adequate. Views of the colon were adequate with washing. Withdrawal time was 12 minutes.    Air was then withdrawn and the endoscope was removed. The patient tolerated the procedure well. There were no immediate  postoperative complications. The patient’s vital signs were monitored throughout the procedure and remained stable.    Estimated blood loss: insignificant    Specimens collected: colon polyps    Complications: none     Colonoscopy findings:  There were scattered diverticulosis throughout the colon    Cecum: There was a 5 mm polyp removed by cold snare polypectomy.  There was a 2 to 3 mm polyp removed by cold biopsy forceps.  Otherwise, normal mucosa and vascular pattern    Ascending colon: normal mucosa and vascular pattern    Transverse colon: normal mucosa and vascular pattern    Descending colon: normal mucosa and vascular pattern    Sigmoid colon: normal mucosa and vascular pattern    Rectum: retroflexed view showed small to medium sized non-bleeding hemorrhoids. Otherwise, normal mucosa and vascular pattern.    Impression:  1. Two diminutive colon polyps removed  2. Colon diverticulosis  3. Hemorrhoids  4. Otherwise, unremarkable colonoscopy    Recommend:  1. Await pathology.   2. Consider discontinuing routine/surveillance colonoscopy in the future but consider colonoscopy in the futuer for new signs or symptoms   3. Continue your current medications including resuming anti-coagulation in 24 hours  4. Increase fiber in the diet  5. Follow up with your primary care physician on a routine basis    >>>If biopsies were performed and you have not received your pathology results either by phone or letter within 2 weeks, please call our office at [phone].    River Zuniga MD  [facility] - Gastroenterology  7/7/2025

## 2025-07-07 NOTE — H&P
History & Physical Examination    Patient Name: Freddie Bautisat  MRN: [id number]  CSN: [id number]  YOB: 1945    Diagnosis: colorectal cancer screening, history of adenomatous colon polyp    Last colonoscopy in 2019    Prescriptions Prior to Admission[1]  Current Hospital Medications[2]    Allergies: Allergies[3]    Past Medical History[4]  Past Surgical History[5]  Family History[6]  Social History     Tobacco Use    Smoking status: Former     Current packs/day: 0.00     Average packs/day: 0.3 packs/day for 2.0 years (0.5 ttl pk-yrs)     Types: Cigarettes     Start date: 10/5/1967     Quit date: 10/5/1969     Years since quittin.7     Passive exposure: Past    Smokeless tobacco: Never   Substance Use Topics    Alcohol use: Yes     Alcohol/week: 0.8 standard drinks of alcohol     Types: 1 Cans of beer per week     Comment: very rare       SYSTEM Check if Physical Exam is Normal If not normal, please explain:   HEENT [ X]    NECK  [ X]    HEART [ X]    LUNGS [ X]    ABDOMEN [ X]    EXTREMITIES [ X]      General:awake, cooperative, no acute distress  HEENT: EOMI, no scleral icterus, MMM; oral pharnyx is without exudates or lesions  Neck: no lymphadenopathy; thyroid is not enlarged and without nodules  CV: RRR  Resp: non-labored breathing  Abd: soft, non-tender, non-distended  Ext: no lower extremity swelling  Neuro: Alert, Oriented X 3  Skin: no rashes, bruises  Psych: normal affect  I have discussed the risks and benefits and alternatives of the procedure with the patient/family.  They understand and agreed to proceed with plan of care.   River Zuniga MD  [facility] - Gastroenterology  2025  8:50 AM                   [1]   Medications Prior to Admission   Medication Sig Dispense Refill Last Dose/Taking    warfarin 5 MG Oral Tab Take 1.5 tablets (7.5 mg total) by mouth daily. 7.5 mg daily   2025    amoxicillin 500 MG Oral Cap Take 4 capsules (2,000 mg total) by mouth as needed. 1 HOUR  BEFORE DENTAL APPT   7/6/2025    levothyroxine 100 MCG Oral Tab Take 1 tablet (100 mcg total) by mouth daily. 90 tablet 3 7/7/2025 Morning    carBAMazepine  MG Oral Tablet 12 Hr Take 1 tablet (400 mg total) by mouth 3 (three) times daily. 270 tablet 1 7/7/2025 Morning    carvedilol 3.125 MG Oral Tab Take 1 tablet (3.125 mg total) by mouth 2 (two) times daily with meals. 60 tablet 5 7/7/2025 Morning    sacubitril-valsartan 24-26 MG Oral Tab Take 1 tablet by mouth in the morning and 1 tablet before bedtime.   7/7/2025 Morning    Magnesium Oxide 400 (240 Mg) MG Oral Tab Take 1 tablet (400 mg total) by mouth in the morning.   7/6/2025    atorvastatin 80 MG Oral Tab Take 1 tablet (80 mg total) by mouth nightly.   7/6/2025    ascorbic acid 1000 MG Oral Tab Take 1 tablet (1,000 mg total) by mouth daily. 30 tablet 0 7/6/2025    Vitamin D3, Cholecalciferol, 25 MCG Oral Tab Take 2 tablets (2,000 Units total) by mouth daily. 30 tablet 0 7/6/2025    Multiple Vitamins-Minerals (MERLIN MULTIVITAMIN FOR MEN) Oral Tab Take 1 tablet by mouth in the morning.   7/6/2025    zinc sulfate 220 (50 Zn) MG Oral Cap Take 50 mg by mouth in the morning.   7/6/2025    Sennosides-Docusate Sodium (STOOL SOFTENER/LAXATIVE OR) Take by mouth.   7/6/2025    aspirin 81 MG Oral Chew Tab Chew by mouth nightly.   7/6/2025    Glucosamine-Chondroit-Vit C-Mn (GLUCOSAMINE 1500 COMPLEX) Oral Cap Take 1 tablet by mouth in the morning. 1200 mg daily.   7/6/2025    Na Sulfate-K Sulfate-Mg Sulf (SUPREP BOWEL PREP KIT) 17.5-3.13-1.6 GM/177ML Oral Solution Take as directed (Patient not taking: Reported on 6/23/2025) 1 each 0    [2]   Current Facility-Administered Medications   Medication Dose Route Frequency    lactated ringers infusion   Intravenous Continuous   [3]   Allergies  Allergen Reactions    Cat Hair Extract      Other reaction(s): CAT HAIR STD EXTRACT    Sulfa Antibiotics      Other reaction(s): SULFA (SULFONAMIDE ANTIBIOTICS)   [4]   Past Medical  History:   Acquired hypothyroidism    Allergic rhinitis    Arrhythmia    Asthma with COPD (chronic obstructive pulmonary disease) (HCC)    Atherosclerosis of coronary artery    Atrial fibrillation (HCC)    Atrial flutter (HCC)    BCC (basal cell carcinoma of skin)    left side neck    BCC (basal cell carcinoma)    Left chest    Benign prostatic hypertrophy    Closed fracture of left distal radius    Colon adenoma    Congestive heart disease (HCC)    Congestive heart failure (HCC)    Coronary artery disease    Per N coronary stents    Diaphragmatic tear    Difficult airway    Disorder of thyroid    Essential hypertension    Extrinsic asthma, unspecified    H/O hernia repair    Hernia, inguinal, bilateral    Herniated disc    Herniated disc    High blood pressure    High cholesterol    Hyperlipidemia    Inguinal hernia recurrent unilateral    Osteoarthritis    Pulmonary hypertension (HCC)    Pulmonary hypertension (HCC)    S/P ablation of atrial flutter    Sleep apnea    Thyroid nodule    Per NG: surgery x2     Thyroid nodule    TN (trigeminal neuralgia)    Per NG: Anti-seizure medication    TN (trigeminal neuralgia)   [5]   Past Surgical History:  Procedure Laterality Date    Angioplasty (coronary)      Cabg      CABG and aortic valve replacement     Cardiac defibrillator placement      Cardioversion      Cath ablation  2019    for a-flutter in [place]    Colonoscopy      Colonoscopy  2019    Colonoscopy N/A 2019    Procedure: COLONOSCOPY;  Surgeon: River Zuniga MD;  Location: [facility] ENDOSCOPY    Ep cardioversion 1x  08/04/2021         Ep cardioversion 1x  10/18/2021         Ep pulmonary vein/a-fib ablation  10/06/2021         Exc skin benig 2.1-3cm remaindr body  08/10/2022    Hernia surgery      Other surgical history      trigeminal neuralgia surgery in [place]    Other surgical history      partial thyroidectomy for thyroid nodule    Other surgical history      [facility]    Replace aortic valve w/byp      Repr cmpl wnd head,fac,hand 2.6-7.5  08/10/2022    Tonsillectomy     [6]   Family History  Problem Relation Age of Onset    Prostate Cancer Father     Cancer Father         prostate cancer    Stroke Mother         Per NG: TIA's    Other (Other) Mother         vascular dementia    Skin cancer Maternal Grandmother     Cancer Maternal Grandmother     Kidney Disease Maternal Grandfather         Per NG kidney removal    Skin cancer Maternal Grandfather     Prostate Cancer Paternal Grandfather     Cancer Sister         unknown cancer    Other (Other) Sister         eczema    Heart Disease Other         PEr NG: Family history of CAD

## 2025-07-07 NOTE — DISCHARGE INSTRUCTIONS
Home Care Instructions for Colonoscopy with Sedation    Diet:  - Resume your regular diet as tolerated unless otherwise instructed.  - Start with light meals to minimize bloating.  - Do not drink alcohol today.    Medication:  - If you have questions about resuming your normal medications, please contact your Primary Care Physician.    Activities:  - Take it easy today. Do not return to work today.  - Do not drive today.  - Do not operate any machinery today (including kitchen equipment).    Colonoscopy:  - You may notice some rectal \"spotting\" (a little blood on the toilet tissue) for a day or two after the exam. This is normal.  - If you experience any rectal bleeding (not spotting), persistent tenderness or sharp severe abdominal pains, oral temperature over 100 degrees Fahrenheit, light-headedness or dizziness, or any other problems, contact your doctor.      **If unable to reach your doctor, please go to the [facility] Emergency Room**    - Your referring physician will receive a full report of your examination.  - If you do not hear from your doctor's office within two weeks of your biopsy, please call them for your results.    You may be able to see your laboratory results in [facility] between 4 and 7 business days.  In some cases, your physician may not have viewed the results before they are released to ARX.  If you have questions regarding your results contact the physician who ordered the test/exam by phone or via ARX by choosing \"Ask a Medical Question.\"    Restart Coumadin in 24hr. Tomorrow 7/8/25

## 2025-07-07 NOTE — ANESTHESIA POSTPROCEDURE EVALUATION
Patient: Freddie Bautista    Procedure Summary       Date: 07/07/25 Room / Location: [facility] ENDOSCOPY 01 / [facility] ENDOSCOPY    Anesthesia Start: 0853 Anesthesia Stop:     Procedure: COLONOSCOPY Diagnosis:       Screen for colon cancer      History of colonic polyps      (colon poylps, hemorrhoids, diverticulosis)    Surgeons: River Zuniga MD Anesthesiologist: Sandra Schulz CRNA    Anesthesia Type: MAC ASA Status: 4            Anesthesia Type: No value filed.    Vitals Value Taken Time   BP 99/72 07/07/25 09:18   Temp 97.4 °F (36.3 °C) 07/07/25 09:18   Pulse 70 07/07/25 09:18   Resp 20 07/07/25 09:18   SpO2 95 % 07/07/25 09:18       [facility] AN Post Evaluation:   Patient Evaluated in PACU  Patient Participation: complete - patient participated  Level of Consciousness: sleepy but conscious  Pain Score: 0  Pain Management: adequate  Airway Patency:patent  Dental exam unchanged from preop  Yes    Cardiovascular Status: stable and acceptable  Respiratory Status: acceptable and room air  Postoperative Hydration acceptable      SANDRA SCHULZ CRNA  7/7/2025 9:19 AM

## 2025-07-07 NOTE — ANESTHESIA PREPROCEDURE EVALUATION
Anesthesia PreOp Note    HPI:     Freddie Bautista is a 79 year old male who presents for preoperative consultation requested by: River Zuniga MD    Date of Surgery: 7/7/2025    Procedure(s):  COLONOSCOPY  Indication: Screen for colon cancer/ History of colonic polyps    Relevant Problems   No relevant active problems       NPO:  Last Liquid Consumption Date: 07/07/25  Last Liquid Consumption Time: 0530  Last Solid Consumption Date: 07/06/25  Last Solid Consumption Time: 0900  Last Liquid Consumption Date: 07/07/25          History Review:  Patient Active Problem List    Diagnosis Date Noted    Hypothyroidism 05/01/2025    Congestive heart failure (HCC) 05/01/2025    Complex tear of medial meniscus of right knee as current injury 05/01/2025    Primary osteoarthritis of right knee 05/01/2025    Internal derangement of right knee 03/24/2025    Locking of right knee 03/24/2025    Acquired hypothyroidism 03/09/2025    Difficult airway 08/11/2024    Pneumoperitoneum 08/10/2024    AV block, 3rd degree (HCC) 06/15/2023    Chronic systolic heart failure (HCC) 09/16/2021    Trigeminal neuralgia 07/19/2020    Chronic atrial fibrillation (HCC) 07/19/2019    History of thyroid nodule 07/19/2019    Medicare annual wellness visit, subsequent 07/19/2019    Long term current use of anticoagulant 06/12/2019    CAD (coronary artery disease), native coronary artery 11/30/2014    Valvular heart disease 11/30/2014    BPH (benign prostatic hyperplasia) 10/01/2014    Allergic rhinitis due to allergen 02/03/2012    Essential hypertension, benign 12/14/2010    Pure hypercholesterolemia 02/23/2010       Past Medical History[1]    Past Surgical History[2]    Prescriptions Prior to Admission[3]  Current Medications and Prescriptions Ordered in Epic[4]    Allergies[5]    Family History[6]  Social Hx on file[7]    Available pre-op labs reviewed.  Lab Results   Component Value Date    .0 04/28/2025        Lab Results   Component Value  Date    INR 1.91 (H) 06/26/2025       Vital Signs:  Body mass index is 30.54 kg/m².   height is 1.702 m (5' 7\") and weight is 88.5 kg (195 lb). His blood pressure is 130/90 and his pulse is 70. His respiration is 22 and oxygen saturation is 96%.   Vitals:    06/30/25 1238 07/07/25 0739   BP:  130/90   Pulse:  70   Resp:  22   SpO2:  96%   Weight: 88.5 kg (195 lb)    Height: 1.702 m (5' 7\")         Anesthesia Evaluation     Patient summary reviewed and Nursing notes reviewed    Airway   Mallampati: III  TM distance: >3 FB  Neck ROM: full  Dental - Dentition appears grossly intact     Pulmonary - normal exam   (+) COPD, asthma, sleep apnea  Cardiovascular - normal exam  (+) pacemaker, hypertension, CAD, CHF    Neuro/Psych - negative ROS     GI/Hepatic/Renal - negative ROS     Endo/Other    (+) hypothyroidism  Abdominal  - normal exam                 Anesthesia Plan:   ASA:  4  Plan:   MAC  Informed Consent Plan and Risks Discussed With:  Patient  Discussed plan with:  Surgeon      I have informed Freddie Bautista and/or legal guardian or family member of the nature of the anesthetic plan, benefits, risks including possible dental damage if relevant, major complications, and any alternative forms of anesthetic management.   All of the patient's questions were answered to the best of my ability. The patient desires the anesthetic management as planned.  ANALI SCHULZ CRNA  7/7/2025 7:43 AM  Present on Admission:  **None**           [1]   Past Medical History:   Acquired hypothyroidism    Allergic rhinitis    Arrhythmia    Asthma with COPD (chronic obstructive pulmonary disease) (HCC)    Atherosclerosis of coronary artery    Atrial fibrillation (HCC)    Atrial flutter (HCC)    BCC (basal cell carcinoma of skin)    left side neck    BCC (basal cell carcinoma)    Left chest    Benign prostatic hypertrophy    Closed fracture of left distal radius    Colon adenoma    Congestive heart disease (HCC)    Congestive heart failure  (HCC)    Coronary artery disease    Per N coronary stents    Diaphragmatic tear    Difficult airway    Disorder of thyroid    Essential hypertension    Extrinsic asthma, unspecified    H/O hernia repair    Hernia, inguinal, bilateral    Herniated disc    Herniated disc    High blood pressure    High cholesterol    Hyperlipidemia    Inguinal hernia recurrent unilateral    Osteoarthritis    Pulmonary hypertension (HCC)    Pulmonary hypertension (HCC)    S/P ablation of atrial flutter    Sleep apnea    Thyroid nodule    Per NG: surgery x2     Thyroid nodule    TN (trigeminal neuralgia)    Per NG: Anti-seizure medication    TN (trigeminal neuralgia)   [2]   Past Surgical History:  Procedure Laterality Date    Angioplasty (coronary)      Cabg      CABG and aortic valve replacement     Cardiac defibrillator placement      Cardioversion      Cath ablation  2019    for a-flutter in [place]    Colonoscopy      Colonoscopy  2019    Colonoscopy N/A 2019    Procedure: COLONOSCOPY;  Surgeon: River Zuniga MD;  Location: [facility] ENDOSCOPY    Ep cardioversion 1x  08/04/2021         Ep cardioversion 1x  10/18/2021         Ep pulmonary vein/a-fib ablation  10/06/2021         Exc skin benig 2.1-3cm remaindr body  08/10/2022    Hernia surgery      Other surgical history      trigeminal neuralgia surgery in [place]    Other surgical history      partial thyroidectomy for thyroid nodule    Other surgical history      Torn diaphram surgery [place]     Replace aortic valve w/byp      Repr cmpl wnd head,fac,hand 2.6-7.5  08/10/2022    Tonsillectomy     [3]   Medications Prior to Admission   Medication Sig Dispense Refill Last Dose/Taking    warfarin 5 MG Oral Tab Take 1.5 tablets (7.5 mg total) by mouth daily. 7.5 mg daily   2025    amoxicillin 500 MG Oral Cap Take 4 capsules (2,000 mg total) by mouth as needed. 1 HOUR BEFORE DENTAL APPT   2025    levothyroxine 100 MCG Oral Tab Take 1 tablet (100  mcg total) by mouth daily. 90 tablet 3 7/7/2025 Morning    carBAMazepine  MG Oral Tablet 12 Hr Take 1 tablet (400 mg total) by mouth 3 (three) times daily. 270 tablet 1 7/7/2025 Morning    carvedilol 3.125 MG Oral Tab Take 1 tablet (3.125 mg total) by mouth 2 (two) times daily with meals. 60 tablet 5 7/7/2025 Morning    sacubitril-valsartan 24-26 MG Oral Tab Take 1 tablet by mouth in the morning and 1 tablet before bedtime.   7/7/2025 Morning    Magnesium Oxide 400 (240 Mg) MG Oral Tab Take 1 tablet (400 mg total) by mouth in the morning.   7/6/2025    atorvastatin 80 MG Oral Tab Take 1 tablet (80 mg total) by mouth nightly.   7/6/2025    ascorbic acid 1000 MG Oral Tab Take 1 tablet (1,000 mg total) by mouth daily. 30 tablet 0 7/6/2025    Vitamin D3, Cholecalciferol, 25 MCG Oral Tab Take 2 tablets (2,000 Units total) by mouth daily. 30 tablet 0 7/6/2025    Multiple Vitamins-Minerals (MERLIN MULTIVITAMIN FOR MEN) Oral Tab Take 1 tablet by mouth in the morning.   7/6/2025    zinc sulfate 220 (50 Zn) MG Oral Cap Take 50 mg by mouth in the morning.   7/6/2025    Sennosides-Docusate Sodium (STOOL SOFTENER/LAXATIVE OR) Take by mouth.   7/6/2025    aspirin 81 MG Oral Chew Tab Chew by mouth nightly.   7/6/2025    Glucosamine-Chondroit-Vit C-Mn (GLUCOSAMINE 1500 COMPLEX) Oral Cap Take 1 tablet by mouth in the morning. 1200 mg daily.   7/6/2025    Na Sulfate-K Sulfate-Mg Sulf (SUPREP BOWEL PREP KIT) 17.5-3.13-1.6 GM/177ML Oral Solution Take as directed (Patient not taking: Reported on 6/23/2025) 1 each 0    [4]   Current Facility-Administered Medications Ordered in Epic   Medication Dose Route Frequency Provider Last Rate Last Admin    lactated ringers infusion   Intravenous Continuous River Zuniga MD         No current [facility]-ordered outpatient medications on file.   [5]   Allergies  Allergen Reactions    Cat Hair Extract      Other reaction(s): CAT HAIR STD EXTRACT    Sulfa Antibiotics      Other reaction(s): SULFA  (SULFONAMIDE ANTIBIOTICS)   [6]   Family History  Problem Relation Age of Onset    Prostate Cancer Father     Cancer Father         prostate cancer    Stroke Mother         Per NG: TIA's    Other (Other) Mother         vascular dementia    Skin cancer Maternal Grandmother     Cancer Maternal Grandmother     Kidney Disease Maternal Grandfather         Per NG kidney removal    Skin cancer Maternal Grandfather     Prostate Cancer Paternal Grandfather     Cancer Sister         unknown cancer    Other (Other) Sister         eczema    Heart Disease Other         PEr NG: Family history of CAD   [7]   Social History  Socioeconomic History    Marital status:    Tobacco Use    Smoking status: Former     Current packs/day: 0.00     Average packs/day: 0.3 packs/day for 2.0 years (0.5 ttl pk-yrs)     Types: Cigarettes     Start date: 10/5/1967     Quit date: 10/5/1969     Years since quittin.7     Passive exposure: Past    Smokeless tobacco: Never   Vaping Use    Vaping status: Never Used   Substance and Sexual Activity    Alcohol use: Yes     Alcohol/week: 0.8 standard drinks of alcohol     Types: 1 Cans of beer per week     Comment: very rare    Drug use: Never   Other Topics Concern    Pt has a pacemaker Yes    Pt has a defibrillator Yes    Reaction to local anesthetic No

## 2025-07-09 ENCOUNTER — TELEPHONE (OUTPATIENT)
Facility: CLINIC | Age: 80
End: 2025-07-09

## 2025-07-09 NOTE — TELEPHONE ENCOUNTER
Patient states that he received a message from the MD regarding the pathology report.  He was in formed that he needed another Colonoscopy in the future but was not given a time frame, so he needs to know when he shoulc schedule.      Patient is requesting a response via [employer].

## 2025-07-14 ENCOUNTER — LAB ENCOUNTER (OUTPATIENT)
Dept: LAB | Age: 80
End: 2025-07-14
Attending: INTERNAL MEDICINE
Payer: MEDICARE

## 2025-07-14 DIAGNOSIS — I48.20 CHRONIC ATRIAL FIBRILLATION (HCC): ICD-10-CM

## 2025-07-14 LAB
INR BLD: 1.21 (ref 0.8–1.2)
PROTHROMBIN TIME: 16 SECONDS (ref 11.6–14.8)

## 2025-07-14 PROCEDURE — 36415 COLL VENOUS BLD VENIPUNCTURE: CPT

## 2025-07-14 PROCEDURE — 85610 PROTHROMBIN TIME: CPT

## 2025-07-18 ENCOUNTER — LAB ENCOUNTER (OUTPATIENT)
Dept: LAB | Age: 80
End: 2025-07-18
Attending: INTERNAL MEDICINE
Payer: MEDICARE

## 2025-07-18 DIAGNOSIS — I48.20 CHRONIC ATRIAL FIBRILLATION (HCC): ICD-10-CM

## 2025-07-18 LAB
INR BLD: 1.78 (ref 0.8–1.2)
PROTHROMBIN TIME: 21.7 SECONDS (ref 11.6–14.8)

## 2025-07-18 PROCEDURE — 36415 COLL VENOUS BLD VENIPUNCTURE: CPT

## 2025-07-18 PROCEDURE — 85610 PROTHROMBIN TIME: CPT

## 2025-07-22 ENCOUNTER — LAB ENCOUNTER (OUTPATIENT)
Dept: LAB | Age: 80
End: 2025-07-22
Attending: INTERNAL MEDICINE
Payer: MEDICARE

## 2025-07-22 DIAGNOSIS — I48.20 CHRONIC ATRIAL FIBRILLATION (HCC): ICD-10-CM

## 2025-07-22 LAB
INR BLD: 2.71 (ref 0.8–1.2)
PROTHROMBIN TIME: 30 SECONDS (ref 11.6–14.8)

## 2025-07-22 PROCEDURE — 36415 COLL VENOUS BLD VENIPUNCTURE: CPT

## 2025-07-22 PROCEDURE — 85610 PROTHROMBIN TIME: CPT

## 2025-07-29 ENCOUNTER — LAB ENCOUNTER (OUTPATIENT)
Dept: LAB | Age: 80
End: 2025-07-29
Attending: INTERNAL MEDICINE

## 2025-07-29 DIAGNOSIS — I48.20 CHRONIC ATRIAL FIBRILLATION (HCC): ICD-10-CM

## 2025-07-29 LAB
INR BLD: 2.38 (ref 0.8–1.2)
PROTHROMBIN TIME: 27.2 SECONDS (ref 11.6–14.8)

## 2025-07-29 PROCEDURE — 85610 PROTHROMBIN TIME: CPT

## 2025-07-29 PROCEDURE — 36415 COLL VENOUS BLD VENIPUNCTURE: CPT

## 2025-08-12 ENCOUNTER — LAB ENCOUNTER (OUTPATIENT)
Dept: LAB | Age: 80
End: 2025-08-12
Attending: INTERNAL MEDICINE

## 2025-08-12 DIAGNOSIS — I48.20 CHRONIC ATRIAL FIBRILLATION (HCC): ICD-10-CM

## 2025-08-12 LAB
INR BLD: 3.1 (ref 0.8–1.2)
PROTHROMBIN TIME: 33.4 SECONDS (ref 11.6–14.8)

## 2025-08-12 PROCEDURE — 36415 COLL VENOUS BLD VENIPUNCTURE: CPT

## 2025-08-12 PROCEDURE — 85610 PROTHROMBIN TIME: CPT

## 2025-08-18 ENCOUNTER — OFFICE VISIT (OUTPATIENT)
Dept: DERMATOLOGY CLINIC | Facility: CLINIC | Age: 80
End: 2025-08-18

## 2025-08-18 DIAGNOSIS — D23.9 BENIGN NEOPLASM OF SKIN, UNSPECIFIED LOCATION: ICD-10-CM

## 2025-08-18 DIAGNOSIS — L81.4 LENTIGO: ICD-10-CM

## 2025-08-18 DIAGNOSIS — B07.9 VERRUCA: ICD-10-CM

## 2025-08-18 DIAGNOSIS — L82.1 SEBORRHEIC KERATOSES: ICD-10-CM

## 2025-08-18 DIAGNOSIS — Z85.828 ENCOUNTER FOR FOLLOW-UP SURVEILLANCE OF SKIN CANCER: ICD-10-CM

## 2025-08-18 DIAGNOSIS — Z08 ENCOUNTER FOR FOLLOW-UP SURVEILLANCE OF SKIN CANCER: ICD-10-CM

## 2025-08-18 DIAGNOSIS — D22.9 MULTIPLE NEVI: ICD-10-CM

## 2025-08-18 DIAGNOSIS — L57.0 AK (ACTINIC KERATOSIS): Primary | ICD-10-CM

## 2025-08-19 ENCOUNTER — LAB ENCOUNTER (OUTPATIENT)
Dept: LAB | Age: 80
End: 2025-08-19
Attending: INTERNAL MEDICINE

## 2025-08-19 DIAGNOSIS — I48.20 CHRONIC ATRIAL FIBRILLATION (HCC): ICD-10-CM

## 2025-08-19 LAB
INR BLD: 3.2 (ref 0.8–1.2)
PROTHROMBIN TIME: 34.6 SECONDS (ref 11.6–14.8)

## 2025-08-19 PROCEDURE — 36415 COLL VENOUS BLD VENIPUNCTURE: CPT

## 2025-08-19 PROCEDURE — 85610 PROTHROMBIN TIME: CPT

## 2025-08-22 ENCOUNTER — TELEPHONE (OUTPATIENT)
Dept: INTERNAL MEDICINE CLINIC | Facility: CLINIC | Age: 80
End: 2025-08-22

## 2025-08-23 ENCOUNTER — OFFICE VISIT (OUTPATIENT)
Dept: INTERNAL MEDICINE CLINIC | Facility: CLINIC | Age: 80
End: 2025-08-23

## 2025-08-23 VITALS
OXYGEN SATURATION: 98 % | HEIGHT: 67 IN | SYSTOLIC BLOOD PRESSURE: 147 MMHG | DIASTOLIC BLOOD PRESSURE: 96 MMHG | WEIGHT: 194.81 LBS | HEART RATE: 69 BPM | BODY MASS INDEX: 30.57 KG/M2

## 2025-08-23 DIAGNOSIS — H60.319 ACUTE DIFFUSE OTITIS EXTERNA, UNSPECIFIED LATERALITY: Primary | ICD-10-CM

## 2025-08-23 DIAGNOSIS — H93.11 CLICKING TINNITUS OF RIGHT EAR: ICD-10-CM

## 2025-08-23 PROCEDURE — 3080F DIAST BP >= 90 MM HG: CPT | Performed by: STUDENT IN AN ORGANIZED HEALTH CARE EDUCATION/TRAINING PROGRAM

## 2025-08-23 PROCEDURE — 99213 OFFICE O/P EST LOW 20 MIN: CPT | Performed by: STUDENT IN AN ORGANIZED HEALTH CARE EDUCATION/TRAINING PROGRAM

## 2025-08-23 PROCEDURE — 1160F RVW MEDS BY RX/DR IN RCRD: CPT | Performed by: STUDENT IN AN ORGANIZED HEALTH CARE EDUCATION/TRAINING PROGRAM

## 2025-08-23 PROCEDURE — 3077F SYST BP >= 140 MM HG: CPT | Performed by: STUDENT IN AN ORGANIZED HEALTH CARE EDUCATION/TRAINING PROGRAM

## 2025-08-23 PROCEDURE — 1159F MED LIST DOCD IN RCRD: CPT | Performed by: STUDENT IN AN ORGANIZED HEALTH CARE EDUCATION/TRAINING PROGRAM

## 2025-08-23 PROCEDURE — 3008F BODY MASS INDEX DOCD: CPT | Performed by: STUDENT IN AN ORGANIZED HEALTH CARE EDUCATION/TRAINING PROGRAM

## 2025-08-23 RX ORDER — FLUOROMETHOLONE 1 MG/ML
SUSPENSION/ DROPS OPHTHALMIC
Qty: 5 ML | Refills: 0 | Status: SHIPPED | OUTPATIENT
Start: 2025-08-23

## 2025-08-23 RX ORDER — CIPROFLOXACIN HYDROCHLORIDE 3.5 MG/ML
SOLUTION/ DROPS TOPICAL
Qty: 5 ML | Refills: 0 | Status: SHIPPED | OUTPATIENT
Start: 2025-08-23

## 2025-08-26 ENCOUNTER — LAB ENCOUNTER (OUTPATIENT)
Dept: LAB | Age: 80
End: 2025-08-26
Attending: INTERNAL MEDICINE

## 2025-08-26 DIAGNOSIS — I48.20 CHRONIC ATRIAL FIBRILLATION (HCC): ICD-10-CM

## 2025-08-26 LAB
INR BLD: 2.6 (ref 0.8–1.2)
PROTHROMBIN TIME: 28.6 SECONDS (ref 11.6–14.8)

## 2025-08-26 PROCEDURE — 36415 COLL VENOUS BLD VENIPUNCTURE: CPT

## 2025-08-26 PROCEDURE — 85610 PROTHROMBIN TIME: CPT

## (undated) DEVICE — LASSO POLYPECTOMY SNARE: Brand: LASSO

## (undated) DEVICE — MEDI-VAC NON-CONDUCTIVE SUCTION TUBING 6MM X 1.8M (6FT.) L: Brand: CARDINAL HEALTH

## (undated) DEVICE — KIT ENDO ORCAPOD 160/180/190

## (undated) DEVICE — V2 SPECIMEN COLLECTION TRAY: Brand: NEPTUNE

## (undated) DEVICE — KIT CLEAN ENDOKIT 1.1OZ GOWNX2

## (undated) DEVICE — GIJAW SINGLE-USE BIOPSY FORCEPS WITH NEEDLE: Brand: GIJAW

## (undated) DEVICE — Device

## (undated) DEVICE — SNARE OPTMZ PLPCTM TRP

## (undated) DEVICE — LINE MNTR ADLT SET O2 INTMD

## (undated) DEVICE — V2 SPECIMEN COLLECTION MANIFOLD KIT: Brand: NEPTUNE

## (undated) DEVICE — 35 ML SYRINGE REGULAR TIP: Brand: MONOJECT

## (undated) DEVICE — SNARE ENDOSCOPIC 10MM ROUND

## (undated) DEVICE — Device: Brand: CUSTOM PROCEDURE KIT

## (undated) DEVICE — Device: Brand: DEFENDO AIR/WATER/SUCTION AND BIOPSY VALVE

## (undated) DEVICE — 60 ML SYRINGE REGULAR TIP: Brand: MONOJECT

## (undated) NOTE — LETTER
10/14/19        [address]  [address] [facility]      Dear Cecilia Bansal,    [facility] records indicate that you have outstanding lab work and or testing that was ordered for you and has not yet been completed:  Orders Placed This Encounter      TSH [E

## (undated) NOTE — LETTER
[facility]  [address], [place], [place]    Authorization for Surgical Operation and Procedure                               I hereby authorize River Zuniga MD, my physician and his/her assistants (if applicable), which may include medical students, residents, and/or fellows, to perform the following surgical operation/ procedure and administer such anesthesia as may be determined necessary by my physician: Operation/Procedure name (s) COLONOSCOPY on Freddie A Bautista   2.   I recognize that during the surgical operation/procedure, unforeseen conditions may necessitate additional or different procedures than those listed above.  I, therefore, further authorize and request that the above-named surgeon, assistants, or designees perform such procedures as are, in their judgment, necessary and desirable.    3.   My surgeon/physician has discussed prior to my surgery the potential benefits, risks and side effects of this procedure; the likelihood of achieving goals; and potential problems that might occur during recuperation.  They also discussed reasonable alternatives to the procedure, including risks, benefits, and side effects related to the alternatives and risks related to not receiving this procedure.  I have had all my questions answered and I acknowledge that no guarantee has been made as to the result that may be obtained.    4.   Should the need arise during my operation/procedure, which includes change of level of care prior to discharge, I also consent to the administration of blood and/or blood products.  Further, I understand that despite careful testing and screening of blood or blood products by collecting agencies, I may still be subject to ill effects as a result of receiving a blood transfusion and/or blood products.  The following are some, but not all, of the potential risks that can occur: fever and allergic reactions, hemolytic reactions, transmission of diseases such as  Hepatitis, AIDS and Cytomegalovirus (CMV) and fluid overload.  In the event that I wish to have an autologous transfusion of my own blood, or a directed donor transfusion, I will discuss this with my physician.  Check only if Refusing Blood or Blood Products  I understand refusal of blood or blood products as deemed necessary by my physician may have serious consequences to my condition to include possible death. I hereby assume responsibility for my refusal and release the hospital, its personnel, and my physicians from any responsibility for the consequences of my refusal.    o  Refuse   5.   I authorize the use of any specimen, organs, tissues, body parts or foreign objects that may be removed from my body during the operation/procedure for diagnosis, research or teaching purposes and their subsequent disposal by hospital authorities.  I also authorize the release of specimen test results and/or written reports to my treating physician on the hospital medical staff or other referring or consulting physicians involved in my care, at the discretion of the Pathologist or my treating physician.    6.   I consent to the photographing or videotaping of the operations or procedures to be performed, including appropriate portions of my body for medical, scientific, or educational purposes, provided my identity is not revealed by the pictures or by descriptive texts accompanying them.  If the procedure has been photographed/videotaped, the surgeon will obtain the original picture, image, videotape or CD.  The hospital will not be responsible for storage, release or maintenance of the picture, image, tape or CD.    7.   I consent to the presence of a  or observers in the operating room as deemed necessary by my physician or their designees.    8.   I recognize that in the event my procedure results in extended X-Ray/fluoroscopy time, I may develop a skin reaction.    9. If I have a Do Not Attempt  Resuscitation (DNAR) order in place, that status will be suspended while in the operating room, procedural suite, and during the recovery period unless otherwise explicitly stated by me (or a person authorized to consent on my behalf). The surgeon or my attending physician will determine when the applicable recovery period ends for purposes of reinstating the DNAR order.  10. Patients having a sterilization procedure: I understand that if the procedure is successful the results will be permanent and it will therefore be impossible for me to inseminate, conceive, or bear children.  I also understand that the procedure is intended to result in sterility, although the result has not been guaranteed.   11. I acknowledge that my physician has explained sedation/analgesia administration to me including the risk and benefits I consent to the administration of sedation/analgesia as may be necessary or desirable in the judgment of my physician.    I CERTIFY THAT I HAVE READ AND FULLY UNDERSTAND THE ABOVE CONSENT TO OPERATION and/or OTHER PROCEDURE.     ____________________________________  _________________________________        ______________________________  Signature of Patient    Signature of Responsible Person                Printed Name of Responsible Person                                      ____________________________________  _____________________________                ________________________________  Signature of Witness        Date  Time         Relationship to Patient    STATEMENT OF PHYSICIAN My signature below affirms that prior to the time of the procedure; I have explained to the patient and/or his/her legal representative, the risks and benefits involved in the proposed treatment and any reasonable alternative to the proposed treatment. I have also explained the risks and benefits involved in refusal of the proposed treatment and alternatives to the proposed treatment and have answered the patient's  questions. If I have a significant financial interest in a co-management agreement or a significant financial interest in any product or implant, or other significant relationship used in this procedure/surgery, I have disclosed this and had a discussion with my patient.     _____________________________________________________              _____________________________  (Signature of Physician)                                                                                         (Date)                                   (Time)  Patient Name: Freddie Bautista      : 1945      Printed: 2025     Medical Record #: [id number]                                      Page 1 of 1

## (undated) NOTE — LETTER
[address], [place]  Authorization for Invasive Procedures  1.  I hereby authorize Dr. Gumaro Post , my physician and whomever may be designated as the doctor's assistant, to perform the following operation and/or procedure:  Cardiac c careful testing and screening of blood and blood products, I may still be subject to ill effects as a result of recieving a blood transfusion an/or blood producst. The following are some, but not all, of the potential risks that can occur: fever and allerg acknowledge that my physician has explained sedation/analgesia administration to me including the risks and benefits. I consent to the administration of sedation/analgesia as may be necessary or desirable in the judgment of my physician.      Signature of EWA

## (undated) NOTE — LETTER
[facility]  Administration of Anesthesia  1. I, Freddie Bautista, or _________________________________ acting on his behalf, (Patient) (Dependent/Representative) request to receive anesthesia for my pending procedure/operation/treatment.   HOOD blackwell bleeding, seizure, cardiac arrest and death. 7. AWARENESS: I understand that it is possible (but unlikely) to have explicit memory of events from the operating room while under general anesthesia.   8. ELECTROCONVULSIVE THERAPY PATIENTS: This consent serve below affirms that prior to the time of the procedure, I have explained to the patient and/or his/her guardian, the risks and benefits of undergoing anesthesia, as well as any reasonable alternatives.     ___________________________________________________

## (undated) NOTE — LETTER
[facility], [place]  Authorization for Invasive Procedures  1.  I hereby authorize Dr. Ahmet Freeman , my physician and whomever may be designated as the doctor's assistant, to perform the following operation and/or procedure:  Neo Hwang allergic reactions, hemolytic reactions, transmission of disease such as hepatitis, AIDS, cytomegalovirus (CMV), and flluid overload.  In the event that I wish to have autologous transfusions of my own blood, or a directed donor transfusion, I will discuss Patient:  ________________________________________________ Date: _________Time: _________    Responsible person in case of minor or unconscious: _____________________________Relationship: ____________     Witness Signature: ________________________________

## (undated) NOTE — LETTER
11/26/19        Jenni Shah      Dear Jatinder Velasquez,    [address] records indicate that you have outstanding lab work and or testing that was ordered for you and has not yet been completed:  Orders Placed This Encounter           U

## (undated) NOTE — ED AVS SNAPSHOT
Charliene Babinski   MRN: [id number]    Department:  [facility] Emergency Department   Date of Visit:  12/5/2017           Disclosure     Insurance plans vary and the physician(s) referred by the ER may not be covered by your plan.  Please contact yo within the next three months to obtain basic health screening including reassessment of your blood pressure.     IF THERE IS ANY CHANGE OR WORSENING OF YOUR CONDITION, CALL YOUR PRIMARY CARE PHYSICIAN AT ONCE OR RETURN IMMEDIATELY TO THE EMERGENCY DEPARTMEN

## (undated) NOTE — LETTER
[facility]  Administration of Anesthesia  IFreddie agree to be cared for by a physician anesthesiologist alone and/or with a nurse anesthetist, who is specially trained to monitor me and give me medicine to put me to sleep or keep me comfortable during my procedure    I understand that my anesthesiologist and/or anesthetist is not an employee or agent of [facility] or [employer]. He or she works for [facility]    As the patient asking for anesthesia services, I agree to:  Allow the anesthesiologist (anesthesia doctor) to give me medicine and do additional procedures as necessary. Some examples are: Starting or using an “IV” to give me medicine, fluids or blood during my procedure, and having a breathing tube placed to help me breathe when I’m asleep (intubation). In the event that my heart stops working properly, I understand that my anesthesiologist will make every effort to sustain my life, unless otherwise directed by [facility] Do Not Resuscitate documents.  Tell my anesthesia doctor before my procedure:  If I am pregnant.  The last time that I ate or drank.  iii. All of the medicines I take (including prescriptions, herbal supplements, and pills I can buy without a prescription (including street drugs/illegal medications). Failure to inform my anesthesiologist about these medicines may increase my risk of anesthetic complications.  iv.If I am allergic to anything or have had a reaction to anesthesia before.  I understand how the anesthesia medicine will help me (benefits).  I understand that with any type of anesthesia medicine there are risks:  The most common risks are: nausea, vomiting, sore throat, muscle soreness, damage to my eyes, mouth, or teeth (from breathing tube placement).  Rare risks include: remembering what happened during my procedure, allergic reactions to medications, injury to my airway, heart, lungs, vision, nerves, or  muscles and in extremely rare instances death.  My doctor has explained to me other choices available to me for my care (alternatives).  Pregnant Patients (“epidural”):  I understand that the risks of having an epidural (medicine given into my back to help control pain during labor), include itching, low blood pressure, difficulty urinating, headache or slowing of the baby’s heart. Very rare risks include infection, bleeding, seizure, irregular heart rhythms and nerve injury.  Regional Anesthesia (“spinal”, “epidural”, & “nerve blocks”):  I understand that rare but potential complications include headache, bleeding, infection, seizure, irregular heart rhythms, and nerve injury.    _____________________________________________________________________________  Patient (or Representative) Signature/Relationship to Patient  Date   Time    _____________________________________________________________________________   Name (if used)    Language/Organization   Time    _____________________________________________________________________________  Nurse Anesthetist Signature     Date   Time  _____________________________________________________________________________  Anesthesiologist Signature     Date   Time  I have discussed the procedure and information above with the patient (or patient’s representative) and answered their questions. The patient or their representative has agreed to have anesthesia services.    _____________________________________________________________________________  Witness        Date   Time  I have verified that the signature is that of the patient or patient’s representative, and that it was signed before the procedure  Patient Name: Freddie Bautista     : 1945                 Printed: 2025 at 12:52 PM    Medical Record #: [id number]                                            Page 1 of 1  ----------ANESTHESIA CONSENT----------

## (undated) NOTE — LETTER
9/6/2024    Freddie Bautista        [address] [place] [address]            Dear Freddie Bautista,      Our records indicate that you are due for an appointment for a Colonoscopy with River Zuniga MD. Our doctors are booking out about 3-6 months in advance for procedures.     Please call our office to schedule a phone screening appointment to plan for the procedure(s).   Your medical well-being is important to us.    If your insurance requires a referral, please call your primary care office to request one.      Thank you,      The Physicians and Staff at [facility]

## (undated) NOTE — LETTER
[facility] [facility]    Consent for Coronary CT Angiography    Your doctor has recommended that you have a Coronary CT Angiography procedure.  Coronary CT Angiography is a diagnostic procedure using computed tomography to scan the can range from very minor to very serious leading to a life threatening situation or even death. Please be sure to communicate any allergy you may have to your caregiver immediately.     The information that is obtained during your testing will be treated a

## (undated) NOTE — LETTER
09/13/19        Neris Alberto [place]      Dear Rose Yeboah,    [facility] records indicate that you have outstanding lab work and or testing that was ordered for you and has not yet been completed:  Orders Placed This Encounter           U

## (undated) NOTE — LETTER
[address] [place] [address]  Authorization for Invasive Procedures  1.  I hereby authorize Dr. Umu Cruz , my physician and whomever may be designated as the doctor's assistant, to perform the following operation and/or procedure:  *** on [facility] performed for the purposes of advancing medicine, science, and/or education, provided my identity is not revealed. If the procedure has been videotaped, the physician/surgeon will obtain the original videotape.  The hospital will not be responsible for stor My signature below affirms that prior to the time of the procedure, I have explained to the patient and/or his legal representative, the risks and benefits involved in the proposed treatment and any reasonable alternative to the proposed treatment.  I have

## (undated) NOTE — LETTER
[address] [place] [address]  Omer Peres       08/02/22        Patient: Zita Romano   YOB: 1945   Date of Visit: 8/2/2022       Dear  Dr. Demar Cortez, [facility],      Thank you for referring Zita Romano to my practice. Please find my assessment and plan below. SSESSMENT AND PLAN    1. Skin cancer of scalp or skin of neck  Biopsy-proven basal transfer will be left neck. We discussed further excision due to the positive margin and reconstruction with intraoperative frozen section analysis to rule out positivity of any margins. He understands the risk of surgery to include but not be limited to postoperative pain, bleeding as well as poor cosmesis and recurrence. He accepts these risks and wishes to proceed. Sincerely,   Sabi Duong. Petey Sorto MD   [address], [facility]  [address]  [place] [id number]    Document electronically generated by:  Sabi Duong.  Petey Sorto MD

## (undated) NOTE — LETTER
[facility]  Administration of Anesthesia  1. I, Freddie Bautista, or _________________________________ acting on his behalf, (Patient) (Dependent/Representative) request to receive anesthesia for my pending procedure/operation/treatment.   HOOD blackwell infections, high spinal block, spinal bleeding, seizure, cardiac arrest and death. 7. AWARENESS: I understand that it is possible (but unlikely) to have explicit memory of events from the operating room while under general anesthesia.   8. ELECTROCONVULSIV (Date) (Time)                                                                                               (Responsible person in case of minor/ unconscious pt) /Relationship    My signature below affirms that prior to the time of the procedure, I have ex

## (undated) NOTE — LETTER
[facility], [place]  Authorization for Invasive Procedures  1.  I hereby authorize Dr. Jesus Larose , my physician and whomever may be designated as the doctor's assistant, to perform the following operation and/or procedure:  Quinton Castorena potential risks that can occur: fever and allergic reactions, hemolytic reactions, transmission of disease such as hepatitis, AIDS, cytomegalovirus (CMV), and flluid overload.  In the event that I wish to have autologous transfusions of my own blood, or a d judgment of my physician.      Signature of Patient:  ________________________________________________ Date: _________Time: _________    Responsible person in case of minor or unconscious: _____________________________Relationship: ____________     Witness

## (undated) NOTE — LETTER
[facility]  Administration of Anesthesia  1. I, Freddie Bautista, or _________________________________ acting on his behalf, (Patient) (Dependent/Representative) request to receive anesthesia for my pending procedure/operation/treatment.   HOOD blackwell bleeding, seizure, cardiac arrest and death. 7. AWARENESS: I understand that it is possible (but unlikely) to have explicit memory of events from the operating room while under general anesthesia.   8. ELECTROCONVULSIVE THERAPY PATIENTS: This consent serve below affirms that prior to the time of the procedure, I have explained to the patient and/or his/her guardian, the risks and benefits of undergoing anesthesia, as well as any reasonable alternatives.     ___________________________________________________